# Patient Record
Sex: FEMALE | Race: WHITE | Employment: OTHER | URBAN - METROPOLITAN AREA
[De-identification: names, ages, dates, MRNs, and addresses within clinical notes are randomized per-mention and may not be internally consistent; named-entity substitution may affect disease eponyms.]

---

## 2017-03-27 ENCOUNTER — TELEPHONE (OUTPATIENT)
Dept: FAMILY MEDICINE | Facility: CLINIC | Age: 77
End: 2017-03-27

## 2017-03-27 NOTE — TELEPHONE ENCOUNTER
Patient called the pharmacy asking for provider to send prescription for meclizine.    If this refill is appropriate for the patient, please send a new RX; if this is not appropriate or the patient needs to be seen, please call the patient.  Thank you  Miri Khan [facility] [place]  Phone: [phone]  Fax: [phone]

## 2017-05-19 ENCOUNTER — TRANSFERRED RECORDS (OUTPATIENT)
Dept: HEALTH INFORMATION MANAGEMENT | Facility: CLINIC | Age: 77
End: 2017-05-19

## 2017-05-21 ENCOUNTER — OFFICE VISIT (OUTPATIENT)
Dept: URGENT CARE | Facility: URGENT CARE | Age: 77
End: 2017-05-21
Payer: COMMERCIAL

## 2017-05-21 VITALS
DIASTOLIC BLOOD PRESSURE: 75 MMHG | TEMPERATURE: 97.2 F | OXYGEN SATURATION: 95 % | SYSTOLIC BLOOD PRESSURE: 148 MMHG | BODY MASS INDEX: 30.29 KG/M2 | HEART RATE: 88 BPM | WEIGHT: 182 LBS

## 2017-05-21 DIAGNOSIS — R30.0 DYSURIA: ICD-10-CM

## 2017-05-21 DIAGNOSIS — N39.0 URINARY TRACT INFECTION, SITE UNSPECIFIED: Primary | ICD-10-CM

## 2017-05-21 DIAGNOSIS — R82.90 NONSPECIFIC FINDING ON EXAMINATION OF URINE: ICD-10-CM

## 2017-05-21 LAB
ALBUMIN UR-MCNC: ABNORMAL MG/DL
APPEARANCE UR: ABNORMAL
BACTERIA #/AREA URNS HPF: ABNORMAL /HPF
BILIRUB UR QL STRIP: NEGATIVE
COLOR UR AUTO: YELLOW
GLUCOSE UR STRIP-MCNC: NEGATIVE MG/DL
HGB UR QL STRIP: ABNORMAL
KETONES UR STRIP-MCNC: NEGATIVE MG/DL
LEUKOCYTE ESTERASE UR QL STRIP: ABNORMAL
NITRATE UR QL: NEGATIVE
NON-SQ EPI CELLS #/AREA URNS LPF: ABNORMAL /LPF
PH UR STRIP: 5.5 PH (ref 5–7)
RBC #/AREA URNS AUTO: ABNORMAL /HPF (ref 0–2)
SP GR UR STRIP: 1.01 (ref 1–1.03)
URN SPEC COLLECT METH UR: ABNORMAL
UROBILINOGEN UR STRIP-ACNC: 0.2 EU/DL (ref 0.2–1)
WBC #/AREA URNS AUTO: ABNORMAL /HPF (ref 0–2)

## 2017-05-21 PROCEDURE — 87186 SC STD MICRODIL/AGAR DIL: CPT | Performed by: FAMILY MEDICINE

## 2017-05-21 PROCEDURE — 87086 URINE CULTURE/COLONY COUNT: CPT | Performed by: FAMILY MEDICINE

## 2017-05-21 PROCEDURE — 99213 OFFICE O/P EST LOW 20 MIN: CPT | Performed by: FAMILY MEDICINE

## 2017-05-21 PROCEDURE — 87088 URINE BACTERIA CULTURE: CPT | Performed by: FAMILY MEDICINE

## 2017-05-21 PROCEDURE — 81001 URINALYSIS AUTO W/SCOPE: CPT | Performed by: FAMILY MEDICINE

## 2017-05-21 RX ORDER — CEPHALEXIN 500 MG/1
500 CAPSULE ORAL 4 TIMES DAILY
Qty: 40 CAPSULE | Refills: 0 | Status: SHIPPED | OUTPATIENT
Start: 2017-05-21 | End: 2017-05-21

## 2017-05-21 RX ORDER — CEPHALEXIN 500 MG/1
500 CAPSULE ORAL 4 TIMES DAILY
Qty: 40 CAPSULE | Refills: 0 | Status: SHIPPED | OUTPATIENT
Start: 2017-05-21 | End: 2017-06-06

## 2017-05-21 NOTE — NURSING NOTE
"Chief Complaint   Patient presents with     Urinary Problem     urgency and pain in lower abdomen x 5 days. has been pushing fluids        Initial /75  Pulse 88  Temp 97.2  F (36.2  C) (Tympanic)  Wt 182 lb (82.6 kg)  SpO2 95%  BMI 30.29 kg/m2 Estimated body mass index is 30.29 kg/(m^2) as calculated from the following:    Height as of 12/27/16: 5' 5\" (1.651 m).    Weight as of this encounter: 182 lb (82.6 kg).  Medication Reconciliation: complete     KACI Khanna      "

## 2017-05-21 NOTE — PROGRESS NOTES
SUBJECTIVE:   Nuria Shelley is a 77 year old female who  presents today for a possible UTI. Symptoms of urgency and frequency have been going on for 1week(s).  Hematuria no.  gradual onset and worseningand moderate.  There is no history of fever, chills, nausea or vomiting.  No history of vaginal discharge, no concerns for STD. This patient does not have a history of urinary tract infections. 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    Past Medical History:   Diagnosis Date     Allergic rhinitis      Back strain      Breast cancer (H) 2008    Right     Constipation      Depression      Fibrocystic breast      GERD (gastroesophageal reflux disease)      IBS (irritable bowel syndrome)      Urinary stress incontinence      Wrist tendonitis     Left     Current Outpatient Prescriptions   Medication Sig Dispense Refill     albuterol (PROAIR HFA/PROVENTIL HFA/VENTOLIN HFA) 108 (90 BASE) MCG/ACT Inhaler Inhale 2 puffs into the lungs every 6 hours as needed for shortness of breath / dyspnea 1 Inhaler 12     PARoxetine (PAXIL) 20 MG tablet Take 1 tablet (20 mg) by mouth At Bedtime 90 tablet 1     diclofenac (VOLTAREN) 75 MG EC tablet Take 1 tablet (75 mg) by mouth 2 times daily 180 tablet 0     fluticasone (FLONASE) 50 MCG/ACT spray Spray 1-2 sprays into both nostrils daily 1 Bottle 11     simvastatin (ZOCOR) 20 MG tablet TAKE ONE TABLET BY MOUTH EVERY NIGHT AT BEDTIME 90 tablet 2     fluticasone (FLONASE) 50 MCG/ACT nasal spray USE ONE TO TWO SPRAYS IN EACH NOSTRIL EVERY DAY 16 g 0     omeprazole 20 MG tablet Take 1 tablet (20 mg) by mouth daily Take 30-60 minutes before a meal. 90 tablet 2     fluticasone-salmeterol (ADVAIR) 100-50 MCG/DOSE diskus inhaler Inhale 1 puff into the lungs 2 times daily 1 Inhaler 11     tiotropium (SPIRIVA HANDIHALER) 18 MCG inhalation capsule Inhale contents of one capsule daily. 30 capsule 11     albuterol (2.5 MG/3ML) 0.083% nebulizer solution  Take 1 vial (2.5 mg) by nebulization every 6 hours as needed for shortness of breath / dyspnea or wheezing 60 vial 9     levalbuterol (XOPENEX) 1.25 MG/3ML nebulizer solution Use 3 ml in nebulizer stat 3 mL 0     order for DME Equipment being ordered: Nebulizer 1 each 0     olopatadine (PATANOL) 0.1 % ophthalmic solution Place 1 drop into both eyes 2 times daily. 1 mL 3     Multiple Vitamin (MULTI-VITAMIN PO) Take  by mouth.       Calcium-Vitamin D (CALCIUM + D PO) Take  by mouth. 1200 mg calcium daily with vitamin D 1000 units daily       Social History   Substance Use Topics     Smoking status: Former Smoker     Years: 20.00     Types: Cigarettes     Quit date: 1/12/1980     Smokeless tobacco: Never Used     Alcohol use Yes      Comment: ocass       ROS:   CONSTITUTIONAL:NEGATIVE for fever, chills, change in weight  ENT/MOUTH: NEGATIVE for ear, mouth and throat problems  RESP:NEGATIVE for significant cough or SOB  CV: NEGATIVE for chest pain, palpitations or peripheral edema  GI: NEGATIVE for nausea, abdominal pain, heartburn, or change in bowel habits  : POSITIVE for urgency and frequency     OBJECTIVE:  /75  Pulse 88  Temp 97.2  F (36.2  C) (Tympanic)  Wt 182 lb (82.6 kg)  SpO2 95%  BMI 30.29 kg/m2  GENERAL APPEARANCE: healthy, alert and no distress  RESP: lungs clear to auscultation - no rales, rhonchi or wheezes  CV: regular rates and rhythm, normal S1 S2, no murmur noted  ABDOMEN:  soft, nontender, no HSM or masses and bowel sounds normal  BACK: No CVA tenderness  PSYCH: mentation appears normal and affect normal/bright    Results for orders placed or performed in visit on 05/21/17   *UA reflex to Microscopic and Culture ([place] and [facility] (except [place] and [place])   Result Value Ref Range    Color Urine Yellow     Appearance Urine Cloudy     Glucose Urine Negative NEG mg/dL    Bilirubin Urine Negative NEG    Ketones Urine Negative NEG mg/dL    Specific Gravity Urine 1.015 1.003 -  1.035    Blood Urine Moderate (A) NEG    pH Urine 5.5 5.0 - 7.0 pH    Protein Albumin Urine Trace (A) NEG mg/dL    Urobilinogen Urine 0.2 0.2 - 1.0 EU/dL    Nitrite Urine Negative NEG    Leukocyte Esterase Urine Large (A) NEG    Source Midstream Urine    Urine Microscopic   Result Value Ref Range    WBC Urine  (A) 0 - 2 /HPF    RBC Urine 10-25 (A) 0 - 2 /HPF    Squamous Epithelial /LPF Urine Few FEW /LPF    Bacteria Urine Few (A) NEG /HPF         ASSESSMENT/PLAN:   (N39.0) Urinary tract infection, site unspecified  (primary encounter diagnosis)  Plan: cephALEXin (KEFLEX) 500 MG capsule,         DISCONTINUED: cephALEXin (KEFLEX) 500 MG         capsule            (R30.0) Dysuria  Comment: UTI  Plan: *UA reflex to Microscopic and Culture ([place]         and [facility] (except [place] and         [place]), Urine Microscopic, CANCELED: *UA         reflex to Microscopic and Culture ([place] and         [facility] (except [place] and         [place])            (R82.90) Nonspecific finding on examination of urine  Comment: UTI  Plan: Urine Culture Aerobic Bacterial            RX keflex given for empiric treatment of presumptive UTI.  Will follow up on urine culture and adjust medication if needed.  Drink plenty of fluids.  Prevention and treatment of UTI's discussed.  Signs and symptoms of pyelonephritis mentioned.      Return to clinic if no resolution of symptoms.    Lance Rain MD  May 21, 2017 3:16 PM

## 2017-05-21 NOTE — MR AVS SNAPSHOT
After Visit Summary   5/21/2017    Nuria Shelley    MRN: [id number]           Patient Information     Date Of Birth          1940        Visit Information        Provider Department      5/21/2017 2:10 PM Lance Rain MD [facility]        Today's Diagnoses     Urinary tract infection, site unspecified    -  1    Dysuria        Nonspecific finding on examination of urine          Care Instructions    Take full course of antibiotic for bladder infection.    We will contact you if there is a change in the antibiotic once the urine culture is finalized.        Urinary Tract Infections in Women  Urinary tract infections (UTIs) are most often caused by bacteria (germs). These bacteria enter the urinary tract. The bacteria may come from outside the body. Or they may travel from the skin outside the rectum or vagina into the urethra. Female anatomy makes it easier for bacteria from the bowel to enter a woman s urinary tract, which is the most common source of UTI. This means women develop UTIs more often than men. Pain in or around the urinary tract is a common UTI symptom. But the only way to know for sure if you have a UTI for the health care provider to test your urine. The two tests that may be done are the urinalysis and urine culture.  Types of UTIs    Cystitis: A bladder infection (cystitis) is the most common UTI in women. You may have urgent or frequent urination. You may also have pain, burning when you urinate, and bloody urine.    Urethritis: This is an inflamed urethra, which is the tube that carries urine from the bladder to outside the body. You may have lower stomach or back pain. You may also have urgent or frequent urination.    Pyelonephritis: This is a kidney infection. If not treated, it can be serious and damage your kidneys. In severe cases, you may be hospitalized. You may have a fever and lower back pain.  Medications to treat a UTI  Most UTIs are treated with  antibiotics. These kill the bacteria. The length of time you need to take them depends on the type of infection. It may be as short as 3 days. If you have repeated UTIs, a low-dose antibiotic may be needed for several months. Take antibiotics exactly as directed. Don t stop taking them until all of the medication is gone. If you stop taking the antibiotic too soon, the infection may not go away, and you may develop a resistance to the antibiotic. This can make it much harder to treat.  Lifestyle changes to treat and prevent UTIs  The lifestyle changes below will help get rid of your UTI. They may also help prevent future UTIs.    Drink plenty of fluids. This includes water, juice, or other caffeine-free drinks. Fluids help flush bacteria out of your body.    Empty your bladder. Always empty your bladder when you feel the urge to urinate. And always urinate before going to sleep. Urine that stays in your bladder can lead to infection. Try to urinate before and after sex as well.    Practice good personal hygiene. Wipe yourself from front to back after using the toilet. This helps keep bacteria from getting into the urethra.    Use condoms during sex. These help prevent UTIs caused by sexually transmitted bacteria. Also, avoid using spermicides during sex. These can increase the risk of UTIs. Choose other forms of birth control instead. For women who tend to get UTIs after sex, a low-dose of a preventive antibiotic may be used. Be sure to discuss this option with your health care provider.    Follow up with your health care provider as directed. He or she may test to make sure the infection has cleared. If necessary, additional treatment may be started.    5028-6893 [employer]. [address] [place] [address], [place] [address]. All rights reserved. This information is not intended as a substitute for professional medical care. Always follow your healthcare professional's instructions.              Follow-ups  after your visit        Who to contact     If you have questions or need follow up information about today's clinic visit or your schedule please contact [facility] directly at [phone].  Normal or non-critical lab and imaging results will be communicated to you by MyChart, letter or phone within 4 business days after the clinic has received the results. If you do not hear from us within 7 days, please contact the clinic through [employer] or phone. If you have a critical or abnormal lab result, we will notify you by phone as soon as possible.  Submit refill requests through [employer] or call your pharmacy and they will forward the refill request to us. Please allow 3 business days for your refill to be completed.          Additional Information About Your Visit        [employer] Information     [employer] gives you secure access to your electronic health record. If you see a primary care provider, you can also send messages to your care team and make appointments. If you have questions, please call your primary care clinic.  If you do not have a primary care provider, please call [phone] and they will assist you.        Care EveryWhere ID     This is your Care EveryWhere ID. This could be used by other organizations to access your [facility] medical records  [id number]        Your Vitals Were     Pulse Temperature Pulse Oximetry BMI (Body Mass Index)          88 97.2  F (36.2  C) (Tympanic) 95% 30.29 kg/m2         Blood Pressure from Last 3 Encounters:   05/21/17 148/75   12/27/16 124/82   12/01/16 136/80    Weight from Last 3 Encounters:   05/21/17 182 lb (82.6 kg)   12/27/16 184 lb (83.5 kg)   12/01/16 188 lb (85.3 kg)              We Performed the Following     *UA reflex to Microscopic and Culture ([place] and [facility] (except [place] and [place])     Urine Culture Aerobic Bacterial     Urine Microscopic          Today's Medication Changes          These changes are accurate as of: 5/21/17   3:08 PM.  If you have any questions, ask your nurse or doctor.               Start taking these medicines.        Dose/Directions    cephALEXin 500 MG capsule   Commonly known as:  KEFLEX   Used for:  Urinary tract infection, site unspecified   Started by:  Lance Rain MD        Dose:  500 mg   Take 1 capsule (500 mg) by mouth 4 times daily   Quantity:  40 capsule   Refills:  0            Where to get your medicines      Some of these will need a paper prescription and others can be bought over the counter.  Ask your nurse if you have questions.     Bring a paper prescription for each of these medications     cephALEXin 500 MG capsule                Primary Care Provider Office Phone # Fax #    Betty Ham [phone]       [facility] [address] [facility] [id number]        Thank you!     Thank you for choosing [facility]  [facility] your [facility]. Our goal is always to provide you with excellent care. Hearing back from our patients is one way we can continue to improve our services. Please take a few minutes to complete the written survey that you may receive in the mail after your visit with us. Thank you!             Your Updated Medication List - Protect others around you: Learn how to safely use, store and throw away your medicines at www.disposemymeds.org.          This list is accurate as of: 5/21/17  3:08 PM.  Always use your most recent med list.                   Brand Name Dispense Instructions for use    * albuterol (2.5 MG/3ML) 0.083% neb solution     60 vial    Take 1 vial (2.5 mg) by nebulization every 6 hours as needed for shortness of breath / dyspnea or wheezing       * albuterol 108 (90 BASE) MCG/ACT Inhaler    PROAIR HFA/PROVENTIL HFA/VENTOLIN HFA    1 Inhaler    Inhale 2 puffs into the lungs every 6 hours as needed for shortness of breath / dyspnea       CALCIUM + D PO      Take  by mouth. 1200 mg calcium daily with vitamin D 1000 units daily        cephALEXin 500 MG capsule    KEFLEX    40 capsule    Take 1 capsule (500 mg) by mouth 4 times daily       diclofenac 75 MG EC tablet    VOLTAREN    180 tablet    Take 1 tablet (75 mg) by mouth 2 times daily       * fluticasone 50 MCG/ACT spray    FLONASE    16 g    USE ONE TO TWO SPRAYS IN EACH NOSTRIL EVERY DAY       * fluticasone 50 MCG/ACT spray    FLONASE    1 Bottle    Spray 1-2 sprays into both nostrils daily       fluticasone-salmeterol 100-50 MCG/DOSE diskus inhaler    ADVAIR    1 Inhaler    Inhale 1 puff into the lungs 2 times daily       levalbuterol 1.25 MG/3ML neb solution    XOPENEX    3 mL    Use 3 ml in nebulizer stat       MULTI-VITAMIN PO      Take  by mouth.       olopatadine 0.1 % ophthalmic solution    PATANOL    1 mL    Place 1 drop into both eyes 2 times daily.       omeprazole 20 MG tablet     90 tablet    Take 1 tablet (20 mg) by mouth daily Take 30-60 minutes before a meal.       order for DME     1 each    Equipment being ordered: Nebulizer       PARoxetine 20 MG tablet    PAXIL    90 tablet    Take 1 tablet (20 mg) by mouth At Bedtime       simvastatin 20 MG tablet    ZOCOR    90 tablet    TAKE ONE TABLET BY MOUTH EVERY NIGHT AT BEDTIME       tiotropium 18 MCG capsule    SPIRIVA HANDIHALER    30 capsule    Inhale contents of one capsule daily.       * Notice:  This list has 4 medication(s) that are the same as other medications prescribed for you. Read the directions carefully, and ask your doctor or other care provider to review them with you.

## 2017-05-21 NOTE — PATIENT INSTRUCTIONS
Take full course of antibiotic for bladder infection.    We will contact you if there is a change in the antibiotic once the urine culture is finalized.        Urinary Tract Infections in Women  Urinary tract infections (UTIs) are most often caused by bacteria (germs). These bacteria enter the urinary tract. The bacteria may come from outside the body. Or they may travel from the skin outside the rectum or vagina into the urethra. Female anatomy makes it easier for bacteria from the bowel to enter a woman s urinary tract, which is the most common source of UTI. This means women develop UTIs more often than men. Pain in or around the urinary tract is a common UTI symptom. But the only way to know for sure if you have a UTI for the health care provider to test your urine. The two tests that may be done are the urinalysis and urine culture.  Types of UTIs    Cystitis: A bladder infection (cystitis) is the most common UTI in women. You may have urgent or frequent urination. You may also have pain, burning when you urinate, and bloody urine.    Urethritis: This is an inflamed urethra, which is the tube that carries urine from the bladder to outside the body. You may have lower stomach or back pain. You may also have urgent or frequent urination.    Pyelonephritis: This is a kidney infection. If not treated, it can be serious and damage your kidneys. In severe cases, you may be hospitalized. You may have a fever and lower back pain.  Medications to treat a UTI  Most UTIs are treated with antibiotics. These kill the bacteria. The length of time you need to take them depends on the type of infection. It may be as short as 3 days. If you have repeated UTIs, a low-dose antibiotic may be needed for several months. Take antibiotics exactly as directed. Don t stop taking them until all of the medication is gone. If you stop taking the antibiotic too soon, the infection may not go away, and you may develop a resistance to the  antibiotic. This can make it much harder to treat.  Lifestyle changes to treat and prevent UTIs  The lifestyle changes below will help get rid of your UTI. They may also help prevent future UTIs.    Drink plenty of fluids. This includes water, juice, or other caffeine-free drinks. Fluids help flush bacteria out of your body.    Empty your bladder. Always empty your bladder when you feel the urge to urinate. And always urinate before going to sleep. Urine that stays in your bladder can lead to infection. Try to urinate before and after sex as well.    Practice good personal hygiene. Wipe yourself from front to back after using the toilet. This helps keep bacteria from getting into the urethra.    Use condoms during sex. These help prevent UTIs caused by sexually transmitted bacteria. Also, avoid using spermicides during sex. These can increase the risk of UTIs. Choose other forms of birth control instead. For women who tend to get UTIs after sex, a low-dose of a preventive antibiotic may be used. Be sure to discuss this option with your health care provider.    Follow up with your health care provider as directed. He or she may test to make sure the infection has cleared. If necessary, additional treatment may be started.    [id number] [employer]. [address] [place] [address], [place] [address]. All rights reserved. This information is not intended as a substitute for professional medical care. Always follow your healthcare professional's instructions.

## 2017-05-22 LAB
BACTERIA SPEC CULT: ABNORMAL
MICRO REPORT STATUS: ABNORMAL
MICROORGANISM SPEC CULT: ABNORMAL
SPECIMEN SOURCE: ABNORMAL

## 2017-06-05 ENCOUNTER — TRANSFERRED RECORDS (OUTPATIENT)
Dept: HEALTH INFORMATION MANAGEMENT | Facility: CLINIC | Age: 77
End: 2017-06-05

## 2017-06-06 ENCOUNTER — TELEPHONE (OUTPATIENT)
Dept: FAMILY MEDICINE | Facility: CLINIC | Age: 77
End: 2017-06-06

## 2017-06-06 ENCOUNTER — OFFICE VISIT (OUTPATIENT)
Dept: FAMILY MEDICINE | Facility: CLINIC | Age: 77
End: 2017-06-06
Payer: COMMERCIAL

## 2017-06-06 VITALS
TEMPERATURE: 97.7 F | HEIGHT: 65 IN | DIASTOLIC BLOOD PRESSURE: 74 MMHG | BODY MASS INDEX: 30.82 KG/M2 | SYSTOLIC BLOOD PRESSURE: 122 MMHG | HEART RATE: 99 BPM | RESPIRATION RATE: 14 BRPM | OXYGEN SATURATION: 97 % | WEIGHT: 185 LBS

## 2017-06-06 DIAGNOSIS — E78.5 HYPERLIPIDEMIA LDL GOAL <130: ICD-10-CM

## 2017-06-06 DIAGNOSIS — Z87.440 H/O RECURRENT URINARY TRACT INFECTION: Primary | ICD-10-CM

## 2017-06-06 DIAGNOSIS — Z71.89 ADVANCED DIRECTIVES, COUNSELING/DISCUSSION: ICD-10-CM

## 2017-06-06 DIAGNOSIS — C50.911 MALIGNANT NEOPLASM OF RIGHT FEMALE BREAST, UNSPECIFIED SITE OF BREAST: ICD-10-CM

## 2017-06-06 DIAGNOSIS — R39.11 URINARY HESITANCY: ICD-10-CM

## 2017-06-06 DIAGNOSIS — R09.81 CHRONIC NASAL CONGESTION: ICD-10-CM

## 2017-06-06 DIAGNOSIS — J44.9 CHRONIC OBSTRUCTIVE PULMONARY DISEASE, UNSPECIFIED COPD TYPE (H): ICD-10-CM

## 2017-06-06 DIAGNOSIS — F33.0 MAJOR DEPRESSIVE DISORDER, RECURRENT EPISODE, MILD (H): ICD-10-CM

## 2017-06-06 DIAGNOSIS — C79.51 BONE METASTASIS: ICD-10-CM

## 2017-06-06 LAB
BACTERIA #/AREA URNS HPF: ABNORMAL /HPF
NON-SQ EPI CELLS #/AREA URNS LPF: ABNORMAL /LPF
RBC #/AREA URNS AUTO: ABNORMAL /HPF (ref 0–2)
URNS CMNT MICRO: ABNORMAL
WBC #/AREA URNS AUTO: >100 /HPF (ref 0–2)

## 2017-06-06 PROCEDURE — 87086 URINE CULTURE/COLONY COUNT: CPT | Performed by: FAMILY MEDICINE

## 2017-06-06 PROCEDURE — 81001 URINALYSIS AUTO W/SCOPE: CPT | Performed by: FAMILY MEDICINE

## 2017-06-06 PROCEDURE — 99214 OFFICE O/P EST MOD 30 MIN: CPT | Performed by: FAMILY MEDICINE

## 2017-06-06 RX ORDER — CEPHALEXIN 500 MG/1
500 CAPSULE ORAL 3 TIMES DAILY
Qty: 21 CAPSULE | Refills: 0 | Status: CANCELLED | OUTPATIENT
Start: 2017-06-06 | End: 2017-06-13

## 2017-06-06 RX ORDER — CEFDINIR 300 MG/1
600 CAPSULE ORAL DAILY
Qty: 10 CAPSULE | Refills: 0 | Status: SHIPPED | OUTPATIENT
Start: 2017-06-06 | End: 2017-06-11

## 2017-06-06 RX ORDER — FLUTICASONE PROPIONATE 50 MCG
SPRAY, SUSPENSION (ML) NASAL
Qty: 16 G | Refills: 11 | Status: SHIPPED | OUTPATIENT
Start: 2017-06-06 | End: 2019-04-17

## 2017-06-06 RX ORDER — SIMVASTATIN 20 MG
TABLET ORAL
Qty: 90 TABLET | Refills: 0 | Status: SHIPPED | OUTPATIENT
Start: 2017-06-06 | End: 2017-10-06

## 2017-06-06 RX ORDER — PAROXETINE 20 MG/1
20 TABLET, FILM COATED ORAL AT BEDTIME
Qty: 90 TABLET | Refills: 0 | Status: SHIPPED | OUTPATIENT
Start: 2017-06-06 | End: 2017-10-06

## 2017-06-06 NOTE — LETTER
My Depression Action Plan  Name: Nuria Shelley   Date of Birth 1940  Date: 6/6/2017    My doctor: Betty Ham   My clinic: [address]  [place] [address]  [phone]          GREEN    ZONE   Good Control    What it looks like:     Things are going generally well. You have normal up s and down s. You may even feel depressed from time to time, but bad moods usually last less than a day.   What you need to do:  1. Continue to care for yourself (see self care plan)  2. Check your depression survival kit and update it as needed  3. Follow your physician s recommendations including any medication.  4. Do not stop taking medication unless you consult with your physician first.           YELLOW         ZONE Getting Worse    What it looks like:     Depression is starting to interfere with your life.     It may be hard to get out of bed; you may be starting to isolate yourself from others.    Symptoms of depression are starting to last most all day and this has happened for several days.     You may have suicidal thoughts but they are not constant.   What you need to do:     1. Call your care team, your response to treatment will improve if you keep your care team informed of your progress. Yellow periods are signs an adjustment may need to be made.     2. Continue your self-care, even if you have to fake it!    3. Talk to someone in your support network    4. Open up your depression survival kit           RED    ZONE Medical Alert - Get Help    What it looks like:     Depression is seriously interfering with your life.     You may experience these or other symptoms: You can t get out of bed most days, can t work or engage in other necessary activities, you have trouble taking care of basic hygiene, or basic responsibilities, thoughts of suicide or death that will not go away, self-injurious behavior.     What you need to do:  1. Call your care team and  request a same-day appointment. If they are not available (weekends or after hours) call your local crisis line, emergency room or 911.      Electronically signed by: Amarilys Spivey, June 6, 2017    Depression Self Care Plan / Survival Kit    Self-Care for Depression  Here s the deal. Your body and mind are really not as separate as most people think.  What you do and think affects how you feel and how you feel influences what you do and think. This means if you do things that people who feel good do, it will help you feel better.  Sometimes this is all it takes.  There is also a place for medication and therapy depending on how severe your depression is, so be sure to consult with your medical provider and/ or Behavioral Health Consultant if your symptoms are worsening or not improving.     In order to better manage my stress, I will:    Exercise  Get some form of exercise, every day. This will help reduce pain and release endorphins, the  feel good  chemicals in your brain. This is almost as good as taking antidepressants!  This is not the same as joining a gym and then never going! (they count on that by the way ) It can be as simple as just going for a walk or doing some gardening, anything that will get you moving.      Hygiene   Maintain good hygiene (Get out of bed in the morning, Make your bed, Brush your teeth, Take a shower, and Get dressed like you were going to work, even if you are unemployed).  If your clothes don't fit try to get ones that do.    Diet  I will strive to eat foods that are good for me, drink plenty of water, and avoid excessive sugar, caffeine, alcohol, and other mood-altering substances.  Some foods that are helpful in depression are: complex carbohydrates, B vitamins, flaxseed, fish or fish oil, fresh fruits and vegetables.    Psychotherapy  I agree to participate in Individual Therapy (if recommended).    Medication  If prescribed medications, I agree to take them.  Missing doses can  result in serious side effects.  I understand that drinking alcohol, or other illicit drug use, may cause potential side effects.  I will not stop my medication abruptly without first discussing it with my provider.    Staying Connected With Others  I will stay in touch with my friends, family members, and my primary care provider/team.    Use your imagination  Be creative.  We all have a creative side; it doesn t matter if it s oil painting, sand castles, or mud pies! This will also kick up the endorphins.    Witness Beauty  (AKA stop and smell the roses) Take a look outside, even in mid-winter. Notice colors, textures. Watch the squirrels and birds.     Service to others  Be of service to others.  There is always someone else in need.  By helping others we can  get out of ourselves  and remember the really important things.  This also provides opportunities for practicing all the other parts of the program.    Humor  Laugh and be silly!  Adjust your TV habits for less news and crime-drama and more comedy.    Control your stress  Try breathing deep, massage therapy, biofeedback, and meditation. Find time to relax each day.     My support system    Clinic Contact:  Phone number:    Contact 1:  Phone number:    Contact 2:  Phone number:    Spiritism/:  Phone number:    Therapist:  Phone number:    Local crisis center:    Phone number:    Other community support:  Phone number:

## 2017-06-06 NOTE — PROGRESS NOTES
"  SUBJECTIVE:                                                    Nuria Shelley is a 77 year old female who presents to clinic today for the following health issues:    URINARY TRACT SYMPTOMS     Onset: 3 days    Description:   Painful urination (Dysuria): mild   Blood in urine (Hematuria): no   Delay in urine (Hesitency): YES    Intensity: moderate    Progression of Symptoms:  worsening    Accompanying Signs & Symptoms:  Fever/chills: no   Flank pain: no   Nausea and vomiting: no   Any vaginal symptoms: none  Abdominal/Pelvic Pain: YES   History:   History of frequent UTI's: YES  History of kidney stones: no   Sexually Active: no   Possibility of pregnancy: No    Precipitating factors: radiation treatment            Therapies Tried and outcome: Increase fluid intake    ROS:  C: NEGATIVE for fever, chills, change in weight   female: Hx urinary tract infection    I have reviewed the patient's medical history in detail and updated the computerized patient record.     OBJECTIVE:                                                    /74  Pulse 99  Temp 97.7  F (36.5  C)  Resp 14  Ht 5' 5\" (1.651 m)  Wt 185 lb (83.9 kg)  SpO2 97%  BMI 30.79 kg/m2  Body mass index is 30.79 kg/(m^2).  GENERAL: alert, no distress and obese  MS: extremities normal- no gross deformities noted  PSYCH: mentation appears normal, affect normal/bright    Diagnostic Test Results:  Results for orders placed or performed in visit on 05/21/17   *UA reflex to Microscopic and Culture ([facility] (except [place] and [place])   Result Value Ref Range    Color Urine Yellow     Appearance Urine Cloudy     Glucose Urine Negative NEG mg/dL    Bilirubin Urine Negative NEG    Ketones Urine Negative NEG mg/dL    Specific Gravity Urine 1.015 1.003 - 1.035    Blood Urine Moderate (A) NEG    pH Urine 5.5 5.0 - 7.0 pH    Protein Albumin Urine Trace (A) NEG mg/dL    Urobilinogen Urine 0.2 0.2 - 1.0 EU/dL    Nitrite Urine Negative " NEG    Leukocyte Esterase Urine Large (A) NEG    Source Midstream Urine    Urine Microscopic   Result Value Ref Range    WBC Urine  (A) 0 - 2 /HPF    RBC Urine 10-25 (A) 0 - 2 /HPF    Squamous Epithelial /LPF Urine Few FEW /LPF    Bacteria Urine Few (A) NEG /HPF   Urine Culture Aerobic Bacterial   Result Value Ref Range    Specimen Description Midstream Urine     Culture Micro (A)      50,000 to 100,000 colonies/mL Escherichia coli  Plus 10,000 to 50,000 colonies/mL mixed urogenital evgeny Susceptibility testing   not routinely done      Micro Report Status FINAL 05/22/2017     Organism: 50,000 to 100,000 colonies/mL Escherichia coli        Susceptibility    50,000 to 100,000 colonies/ml escherichia coli (chris) -  (no method available)     AMPICILLIN <=2 Susceptible  ug/mL     CEFAZOLIN Value in next row  ug/mL      <=4 SusceptibleCefazolin CHRIS breakpoints are for the treatment of uncomplicated urinary tract infections.  For the treatment of systemic infections, please contact the laboratory for additional testing.     CEFOXITIN Value in next row  ug/mL      <=4 SusceptibleCefazolin CHRIS breakpoints are for the treatment of uncomplicated urinary tract infections.  For the treatment of systemic infections, please contact the laboratory for additional testing.     CEFTAZIDIME Value in next row  ug/mL      <=4 SusceptibleCefazolin CHRIS breakpoints are for the treatment of uncomplicated urinary tract infections.  For the treatment of systemic infections, please contact the laboratory for additional testing.     CEFTRIAXONE Value in next row  ug/mL      <=4 SusceptibleCefazolin CHRIS breakpoints are for the treatment of uncomplicated urinary tract infections.  For the treatment of systemic infections, please contact the laboratory for additional testing.     CIPROFLOXACIN Value in next row  ug/mL      <=4 SusceptibleCefazolin CHRIS breakpoints are for the treatment of uncomplicated urinary tract infections.  For the  treatment of systemic infections, please contact the laboratory for additional testing.     GENTAMICIN Value in next row  ug/mL      <=4 SusceptibleCefazolin JUNO breakpoints are for the treatment of uncomplicated urinary tract infections.  For the treatment of systemic infections, please contact the laboratory for additional testing.     LEVOFLOXACIN Value in next row  ug/mL      <=4 SusceptibleCefazolin JUNO breakpoints are for the treatment of uncomplicated urinary tract infections.  For the treatment of systemic infections, please contact the laboratory for additional testing.     NITROFURANTOIN Value in next row  ug/mL      <=4 SusceptibleCefazolin JUNO breakpoints are for the treatment of uncomplicated urinary tract infections.  For the treatment of systemic infections, please contact the laboratory for additional testing.     TOBRAMYCIN Value in next row  ug/mL      <=4 SusceptibleCefazolin JUNO breakpoints are for the treatment of uncomplicated urinary tract infections.  For the treatment of systemic infections, please contact the laboratory for additional testing.     Trimethoprim/Sulfa Value in next row  ug/mL      <=4 SusceptibleCefazolin JUNO breakpoints are for the treatment of uncomplicated urinary tract infections.  For the treatment of systemic infections, please contact the laboratory for additional testing.     AMPICILLIN/SULBACTAM Value in next row  ug/mL      <=4 SusceptibleCefazolin JUNO breakpoints are for the treatment of uncomplicated urinary tract infections.  For the treatment of systemic infections, please contact the laboratory for additional testing.     Piperacillin/Tazo Value in next row  ug/mL      <=4 SusceptibleCefazolin JUNO breakpoints are for the treatment of uncomplicated urinary tract infections.  For the treatment of systemic infections, please contact the laboratory for additional testing.     CEFEPIME Value in next row  ug/mL      <=4 SusceptibleCefazolin JUNO breakpoints are for  the treatment of uncomplicated urinary tract infections.  For the treatment of systemic infections, please contact the laboratory for additional testing.        ASSESSMENT/PLAN:                                                    (Z87.440) H/O recurrent urinary tract infection  (primary encounter diagnosis)  (R39.11) Urinary hesitancy  Comment: unable to give UA today   Plan: UA future        Omnicef 600 mg daily x 5 days; Call or return to clinic as needed if these symptoms worsen or fail to improve as anticipated.     (C50.911) Malignant neoplasm of right female breast, unspecified site of breast (H)  (C79.51) Bone metastasis (H)  Comment: under care of [facility]     (F33.0) Major depressive disorder, recurrent episode, mild (H)  Comment: Well controlled with medications without side effects.   Plan: PARoxetine (PAXIL) 20 MG tablet        Follow-up 3 months     (J44.9) Chronic obstructive pulmonary disease, unspecified COPD type (H)  Plan: albuterol HFA as needed     (E78.5) Hyperlipidemia LDL goal <130  Comment: Well controlled with medications without side effects.   Plan: simvastatin (ZOCOR) 20 MG tablet        Follow-up 3 months     (R09.81) Chronic nasal congestion  Plan: fluticasone (FLONASE) 50 MCG/ACT spray          (Z71.89) Advanced directives, counseling/discussion  Plan: referral for planning     See Patient Instructions    Amarilys Spivey MD  [facility]

## 2017-06-06 NOTE — TELEPHONE ENCOUNTER
RN spoke with patient and states she has a sharp pain around her groin area and that is the exact symptoms she had few weeks ago when she was seen over UC and was diagnosed with UTI.  Pt states she completed Keflex for 9 days few days ago and her symptoms is back now.  Pt denies of fever, severe abdominal pain, weakness, dizziness, lightheadedness, or blood in the urine at this time.   Since patient c/o sharp pain around the groin area and rated pain 10/10, RN advised patient to be seen in the clinic for further evaluation.  RN helped patient scheduling with Dr. Spivey this evening at 5:20 PM and advised patient call the clinic with worsening symptoms to to go to UC.  Pt agrees and verbalized understanding.    Aguilar BEAL RN, BSN

## 2017-06-06 NOTE — PATIENT INSTRUCTIONS
[facility]    If you have any questions regarding to your visit please contact your care team:       Team Red:   Clinic Hours Telephone Number   Dr. Amarilys Hernandez  (pediatrics)  Katalina Lozano NP 7am-7pm  Monday - Thursday   7am-5pm  Fridays  [phone] (fax)    Aguilar VEGA  [phone]   Urgent Care - [place] and [place] Monday-Friday  [place] - 11am-8pm  Saturday-Sunday  Both sites - 9am-5pm  [phone] - [facility]  [phone] - [place]       What options do I have for visits at the clinic other than the traditional office visit?  To expand how we care for you, many of our providers are utilizing electronic visits (e-visits) and telephone visits, when medically appropriate, for interactions with their patients rather than a visit in the clinic.   We also offer nurse visits for many medical concerns. Just like any other service, we will bill your insurance company for this type of visit based on time spent on the phone with your provider. Not all insurance companies cover these visits. Please check with your medical insurance if this type of visit is covered. You will be responsible for any charges that are not paid by your insurance.      E-visits via [employer]:  generally incur a $35.00 fee.  Telephone visits:  Time spent on the phone: *charged based on time that is spent on the phone in increments of 10 minutes. Estimated cost:   5-10 mins $30.00   11-20 mins. $59.00   21-30 mins. $85.00     As always, Thank you for trusting us with your health care needs!            Discharged by Manasa Smith MA.

## 2017-06-06 NOTE — TELEPHONE ENCOUNTER
Reason for Call:  Other call back    Detailed comments:  Patient calling. She was seen for a uti. She wants to come in for a lab to have the urine rechecked. Please call her and let know.     Phone Number Patient can be reached at: Home number on file [phone] (home)    Best Time:  any    Can we leave a detailed message on this number? YES    Call taken on 6/6/2017 at 9:09 AM by Nati Trinidad

## 2017-06-06 NOTE — PROGRESS NOTES
You have a urinary tract infection. Take the antibiotic as we discussed. Your final test results are pending.  Please check your chart again within 3 to 5 days. You will receive further instruction when your full test result panel is complete.    Amarilys Spivey MD

## 2017-06-06 NOTE — MR AVS SNAPSHOT
After Visit Summary   6/6/2017    Nuria Shelley    MRN: [id number]           Patient Information     Date Of Birth          1940        Visit Information        Provider Department      6/6/2017 5:20 PM Amarilys Spivey MD [facility]        Today's Diagnoses     H/O recurrent urinary tract infection    -  1    Urinary hesitancy        Malignant neoplasm of right female breast, unspecified site of breast (H)        Bone metastasis (H)        Major depressive disorder, recurrent episode, mild (H)        Chronic obstructive pulmonary disease, unspecified COPD type (H)        Hyperlipidemia LDL goal <130        Chronic nasal congestion        Advanced directives, counseling/discussion          Care Instructions    [facility]    If you have any questions regarding to your visit please contact your care team:       Team Red:   Clinic Hours Telephone Number   Dr. Amarilys Hernandez  (pediatrics)  Katalina Lozano NP 7am-7pm  Monday - Thursday   7am-5pm  Fridays  [phone] (fax)    Aguilar VEGA  [phone]   Urgent Care - [place] and [place] Monday-Friday  [place] - 11am-8pm  Saturday-Sunday  Both sites - 9am-5pm  [phone] - [facility]  [phone] - [place]       What options do I have for visits at the clinic other than the traditional office visit?  To expand how we care for you, many of our providers are utilizing electronic visits (e-visits) and telephone visits, when medically appropriate, for interactions with their patients rather than a visit in the clinic.   We also offer nurse visits for many medical concerns. Just like any other service, we will bill your insurance company for this type of visit based on time spent on the phone with your provider. Not all insurance companies cover these visits. Please check with your medical insurance if this type of visit is covered. You will be responsible  for any charges that are not paid by your insurance.      E-visits via [employer]:  generally incur a $35.00 fee.  Telephone visits:  Time spent on the phone: *charged based on time that is spent on the phone in increments of 10 minutes. Estimated cost:   5-10 mins $30.00   11-20 mins. $59.00   21-30 mins. $85.00     As always, Thank you for trusting us with your health care needs!            Discharged by Manasa Smith MA.            Follow-ups after your visit        Follow-up notes from your care team     Return in about 3 months (around 9/6/2017), or if symptoms worsen or fail to improve, for Wellness visit (fasting labs up to one week prior).      Future tests that were ordered for you today     Open Future Orders        Priority Expected Expires Ordered    UA reflex to Microscopic and Culture Routine  6/6/2018 6/6/2017            Who to contact     If you have questions or need follow up information about today's clinic visit or your schedule please contact [facility] directly at [phone].  Normal or non-critical lab and imaging results will be communicated to you by [facility], letter or phone within 4 business days after the clinic has received the results. If you do not hear from us within 7 days, please contact the clinic through [employer] or phone. If you have a critical or abnormal lab result, we will notify you by phone as soon as possible.  Submit refill requests through [employer] or call your pharmacy and they will forward the refill request to us. Please allow 3 business days for your refill to be completed.          Additional Information About Your Visit        Six Apart Information     [employer] gives you secure access to your electronic health record. If you see a primary care provider, you can also send messages to your care team and make appointments. If you have questions, please call your primary care clinic.  If you do not have a primary care provider, please call [phone] and they  "will assist you.        Care EveryWhere ID     This is your Care EveryWhere ID. This could be used by other organizations to access your [facility] medical records  [id number]        Your Vitals Were     Pulse Temperature Respirations Height Pulse Oximetry BMI (Body Mass Index)    99 97.7  F (36.5  C) 14 5' 5\" (1.651 m) 97% 30.79 kg/m2       Blood Pressure from Last 3 Encounters:   06/06/17 122/74   05/21/17 148/75   12/27/16 124/82    Weight from Last 3 Encounters:   06/06/17 185 lb (83.9 kg)   05/21/17 182 lb (82.6 kg)   12/27/16 184 lb (83.5 kg)              We Performed the Following     DEPRESSION ACTION PLAN (DAP)          Today's Medication Changes          These changes are accurate as of: 6/6/17  6:05 PM.  If you have any questions, ask your nurse or doctor.               Start taking these medicines.        Dose/Directions    cefdinir 300 MG capsule   Commonly known as:  OMNICEF   Used for:  H/O recurrent urinary tract infection   Started by:  Amarilys Spivey MD        Dose:  600 mg   Take 2 capsules (600 mg) by mouth daily for 5 days   Quantity:  10 capsule   Refills:  0         These medicines have changed or have updated prescriptions.        Dose/Directions    fluticasone 50 MCG/ACT spray   Commonly known as:  FLONASE   This may have changed:  See the new instructions.   Used for:  Chronic nasal congestion   Changed by:  Amarilys Spivey MD        USE ONE TO TWO SPRAYS IN EACH NOSTRIL EVERY DAY   Quantity:  16 g   Refills:  11       simvastatin 20 MG tablet   Commonly known as:  ZOCOR   This may have changed:  See the new instructions.   Used for:  Hyperlipidemia LDL goal <130   Changed by:  Amarilys Spivey MD        TAKE ONE TABLET BY MOUTH EVERY NIGHT AT BEDTIME   Quantity:  90 tablet   Refills:  0            Where to get your medicines      These medications were sent to [facility] - [address]  [address], [place] [address]     Phone:  " [phone]     cefdinir 300 MG capsule    fluticasone 50 MCG/ACT spray    PARoxetine 20 MG tablet    simvastatin 20 MG tablet                Primary Care Provider Office Phone # Fax #    Betty Ham [phone]       [address]  [place] [address]        Thank you!     Thank you for choosing [facility]  for your care. Our goal is always to provide you with excellent care. Hearing back from our patients is one way we can continue to improve our services. Please take a few minutes to complete the written survey that you may receive in the mail after your visit with us. Thank you!             Your Updated Medication List - Protect others around you: Learn how to safely use, store and throw away your medicines at www.disposemymeds.org.          This list is accurate as of: 6/6/17  6:05 PM.  Always use your most recent med list.                   Brand Name Dispense Instructions for use    albuterol 108 (90 BASE) MCG/ACT Inhaler    PROAIR HFA/PROVENTIL HFA/VENTOLIN HFA    1 Inhaler    Inhale 2 puffs into the lungs every 6 hours as needed for shortness of breath / dyspnea       CALCIUM + D PO      Take  by mouth. 1200 mg calcium daily with vitamin D 1000 units daily       cefdinir 300 MG capsule    OMNICEF    10 capsule    Take 2 capsules (600 mg) by mouth daily for 5 days       fluticasone 50 MCG/ACT spray    FLONASE    16 g    USE ONE TO TWO SPRAYS IN EACH NOSTRIL EVERY DAY       MULTI-VITAMIN PO      Take  by mouth.       olopatadine 0.1 % ophthalmic solution    PATANOL    1 mL    Place 1 drop into both eyes 2 times daily.       PARoxetine 20 MG tablet    PAXIL    90 tablet    Take 1 tablet (20 mg) by mouth At Bedtime       simvastatin 20 MG tablet    ZOCOR    90 tablet    TAKE ONE TABLET BY MOUTH EVERY NIGHT AT BEDTIME

## 2017-06-12 ENCOUNTER — TELEPHONE (OUTPATIENT)
Dept: FAMILY MEDICINE | Facility: CLINIC | Age: 77
End: 2017-06-12

## 2017-06-12 LAB
BACTERIA SPEC CULT: NORMAL
MICRO REPORT STATUS: NORMAL
SPECIMEN SOURCE: NORMAL

## 2017-06-12 NOTE — TELEPHONE ENCOUNTER
Nuria was in to see Dr. Spivey on 06-06-17 and has the diagnosis of Depression and was due for a PHQ-9, Please complete.

## 2017-06-12 NOTE — TELEPHONE ENCOUNTER
Julian Quiñones, I m Le Talley. I work with Dr. Spivey at [facility]. He/she noticed in your chart that you are due for a patient health questionnaire. We would like to complete that today as Dr. Spivey cares about your health.        Called patient; Called patient, completed PHQ9. PHQ9 score: 5. (If patient has sucidal thoughts discuss with Team RN ASAP) <5 PASS, >5 FAIL Needs follow up appointment.  If patient refuses appointment please ask them if they would be willing to speak to the Team RN for further support over the phone.     If PHQ-9 is less than 5, close encounter. If PHQ-9 is 5 or greater, recommend that patient schedule follow up appointment with primary provider (in office, e-visit or phone visit) for medication follow up and evaluation. If positive suicidal thoughts, huddle with RN or provider today and route encounter.     Appointment Made? Patient refused appointment    Le Talley MA

## 2017-06-12 NOTE — PROGRESS NOTES
Dear Nuria,    Your recent test results are attached.      No infection on urine culture.    If you have any questions please feel free to contact [phone] or myself via [employer].    Sincerely,  Poonam Lao, CNP

## 2017-06-13 ASSESSMENT — PATIENT HEALTH QUESTIONNAIRE - PHQ9: SUM OF ALL RESPONSES TO PHQ QUESTIONS 1-9: 5

## 2017-06-29 ENCOUNTER — NURSE TRIAGE (OUTPATIENT)
Dept: NURSING | Facility: CLINIC | Age: 77
End: 2017-06-29

## 2017-06-29 NOTE — TELEPHONE ENCOUNTER
Nuria states that she has recently had radiation therapy. Today she is not feeling well, she has a low grade fever, feels hot and cold and achy with some  Mild shortness of breath. She says that she talked to the nurse at her radiation center who advised she see her primary. Transferred to appointment line.    Alpa Davis RN     [facility] Nurse Advisor

## 2017-07-07 ENCOUNTER — OFFICE VISIT (OUTPATIENT)
Dept: URGENT CARE | Facility: URGENT CARE | Age: 77
End: 2017-07-07
Payer: COMMERCIAL

## 2017-07-07 VITALS
SYSTOLIC BLOOD PRESSURE: 130 MMHG | DIASTOLIC BLOOD PRESSURE: 72 MMHG | HEART RATE: 98 BPM | TEMPERATURE: 97.8 F | BODY MASS INDEX: 30.45 KG/M2 | WEIGHT: 183 LBS

## 2017-07-07 DIAGNOSIS — R30.0 DYSURIA: ICD-10-CM

## 2017-07-07 DIAGNOSIS — M62.81 GENERALIZED MUSCLE WEAKNESS: Primary | ICD-10-CM

## 2017-07-07 DIAGNOSIS — C79.51 BONE METASTASIS: ICD-10-CM

## 2017-07-07 DIAGNOSIS — R53.83 FATIGUE, UNSPECIFIED TYPE: ICD-10-CM

## 2017-07-07 DIAGNOSIS — R10.32 ABDOMINAL PAIN, LEFT LOWER QUADRANT: ICD-10-CM

## 2017-07-07 LAB
ALBUMIN UR-MCNC: NEGATIVE MG/DL
APPEARANCE UR: ABNORMAL
BACTERIA #/AREA URNS HPF: ABNORMAL /HPF
BASOPHILS # BLD AUTO: 0.1 10E9/L (ref 0–0.2)
BASOPHILS NFR BLD AUTO: 1 %
BILIRUB UR QL STRIP: NEGATIVE
COLOR UR AUTO: YELLOW
DIFFERENTIAL METHOD BLD: NORMAL
EOSINOPHIL # BLD AUTO: 0.1 10E9/L (ref 0–0.7)
EOSINOPHIL NFR BLD AUTO: 2 %
ERYTHROCYTE [DISTWIDTH] IN BLOOD BY AUTOMATED COUNT: 14.5 % (ref 10–15)
GLUCOSE UR STRIP-MCNC: NEGATIVE MG/DL
HCT VFR BLD AUTO: 39 % (ref 35–47)
HGB BLD-MCNC: 13.2 G/DL (ref 11.7–15.7)
HGB UR QL STRIP: NEGATIVE
KETONES UR STRIP-MCNC: NEGATIVE MG/DL
LEUKOCYTE ESTERASE UR QL STRIP: ABNORMAL
LYMPHOCYTES # BLD AUTO: 1 10E9/L (ref 0.8–5.3)
LYMPHOCYTES NFR BLD AUTO: 19 %
MCH RBC QN AUTO: 33 PG (ref 26.5–33)
MCHC RBC AUTO-ENTMCNC: 33.8 G/DL (ref 31.5–36.5)
MCV RBC AUTO: 98 FL (ref 78–100)
MONOCYTES # BLD AUTO: 0.7 10E9/L (ref 0–1.3)
MONOCYTES NFR BLD AUTO: 13 %
NEUTROPHILS # BLD AUTO: 3.5 10E9/L (ref 1.6–8.3)
NEUTROPHILS NFR BLD AUTO: 65 %
NITRATE UR QL: NEGATIVE
PH UR STRIP: 5.5 PH (ref 5–7)
PLATELET # BLD AUTO: 243 10E9/L (ref 150–450)
RBC # BLD AUTO: 4 10E12/L (ref 3.8–5.2)
RBC #/AREA URNS AUTO: ABNORMAL /HPF (ref 0–2)
SP GR UR STRIP: 1.01 (ref 1–1.03)
URN SPEC COLLECT METH UR: ABNORMAL
UROBILINOGEN UR STRIP-ACNC: 0.2 EU/DL (ref 0.2–1)
WBC # BLD AUTO: 5.4 10E9/L (ref 4–11)
WBC #/AREA URNS AUTO: ABNORMAL /HPF (ref 0–2)

## 2017-07-07 PROCEDURE — 99215 OFFICE O/P EST HI 40 MIN: CPT | Performed by: FAMILY MEDICINE

## 2017-07-07 PROCEDURE — 36415 COLL VENOUS BLD VENIPUNCTURE: CPT | Performed by: FAMILY MEDICINE

## 2017-07-07 PROCEDURE — 80053 COMPREHEN METABOLIC PANEL: CPT | Performed by: FAMILY MEDICINE

## 2017-07-07 PROCEDURE — 85025 COMPLETE CBC W/AUTO DIFF WBC: CPT | Performed by: FAMILY MEDICINE

## 2017-07-07 PROCEDURE — 87086 URINE CULTURE/COLONY COUNT: CPT | Performed by: FAMILY MEDICINE

## 2017-07-07 PROCEDURE — 81001 URINALYSIS AUTO W/SCOPE: CPT | Performed by: FAMILY MEDICINE

## 2017-07-07 RX ORDER — CEPHALEXIN 500 MG/1
500 CAPSULE ORAL 3 TIMES DAILY
Qty: 21 CAPSULE | Refills: 0 | Status: SHIPPED | OUTPATIENT
Start: 2017-07-07 | End: 2017-07-14

## 2017-07-07 NOTE — PROGRESS NOTES
SUBJECTIVE:                                                    Nuria Shelley is a 77 year old female who presents to clinic today for the following health issues:      URINARY TRACT SYMPTOMS      Duration: X a couple weeks    Description  Dysuria urgency     Intensity:  moderate    Accompanying signs and symptoms:  Fever/chills: no   Flank pain no   Nausea and vomiting: no   Vaginal symptoms: none  Abdominal/Pelvic Pain: YES    History  History of frequent UTI's: YES  History of kidney stones: no   Sexually Active: no   Possibility of pregnancy: No    Precipitating or alleviating factors: None  Therapies tried and outcome: none     Also having generalized weakness with this as well.     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 Was in and was treated for urinary infection    When she had radiation didn't have much pain.  However a couple days after she had radiation started having back pain left lower quadrant and some bilateral back pain. Initially thought maybe just her regular back pain chronic that she had since she fell in February    No loss of control of bowel or bladder, no numbness or weakness down the legs    Patient has vertigo and in February 2016 had an episode where she fell down the stairs and started having back pain on and off since then.     She's not sure if the pains she is having is from her radiation or her cancer.   Couldn't get in to see her specialist.    She is here however because of bladder trouble and weakness she's been feeling  She wants a cbc and a urinalysis     Past couple of weeks has been having after urinating would have pain after she urinates.  Some increased frequency and urgency  No fevers or chills chest pain or shortness of breath     No fevers or chills chest pain or shortness of breath  No orthopnea pnd or edema  Cough, colds, or  respiratory symptoms: No  Focal numbness or weakness: No  Rash: No  Fever or Chills: No  Weight loss: No  Poor Appetite: No  History of Thyroid problems/thyroid medication compliance: No  Headache or Neck stiffness: No  Joint Pain or Arthralgias: No  Melena/Hematochezia/Hematemesis: No  Depression or Mood changes: Yes: stable no thoughts of harming self or others   Rash: No  Concern about recent tick-bite: No  Concern about recent travel/possible exposure: No    Problem list and histories reviewed & adjusted, as indicated.  Additional history: as documented    Problem list, Medication list, Allergies, and Medical/Social/Surgical histories reviewed in [facility] and updated as appropriate.    ROS:  Constitutional, HEENT, cardiovascular, pulmonary, GI, , musculoskeletal, neuro, skin, endocrine and psych systems are negative, except as otherwise noted.    OBJECTIVE:                                                    /72  Pulse 98  Temp 97.8  F (36.6  C) (Oral)  Wt 183 lb (83 kg)  BMI 30.45 kg/m2  Body mass index is 30.45 kg/(m^2).  GENERAL: healthy, alert and no distress  EYES: Eyes grossly normal to inspection, PERRL and conjunctivae and sclerae normal  HENT: ear canals and TM's normal, nose and mouth without ulcers or lesions  NECK: no adenopathy, no asymmetry, masses, or scars and thyroid normal to palpation  RESP: lungs clear to auscultation - no rales, rhonchi or wheezes  CV: regular rate and rhythm, normal S1 S2, no S3 or S4, no murmur, click or rub, no peripheral edema and peripheral pulses strong  ABDOMEN: soft, some tenderness in left lower quadrant but this is worse with movement and change in position, no hepatosplenomegaly, no masses and bowel sounds normal  MS: no gross musculoskeletal defects noted, no edema  SKIN: no suspicious lesions or rashes  NEURO: Normal strength and tone, mentation intact and speech normal  PSYCH: mentation appears normal, affect normal/bright    Diagnostic Test  Results:  Results for orders placed or performed in visit on 07/07/17 (from the past 24 hour(s))   *UA reflex to Microscopic and Culture ([facility] (except [place] and [place])   Result Value Ref [facility]    Color Urine Yellow     Appearance Urine Slightly Cloudy     Glucose Urine Negative NEG mg/dL    Bilirubin Urine Negative NEG    Ketones Urine Negative NEG mg/dL    Specific Gravity Urine 1.015 1.003 - 1.035    Blood Urine Negative NEG    pH Urine 5.5 5.0 - 7.0 pH    Protein Albumin Urine Negative NEG mg/dL    Urobilinogen Urine 0.2 0.2 - 1.0 EU/dL    Nitrite Urine Negative NEG    Leukocyte Esterase Urine Small (A) NEG    Source Midstream Urine    Urine Microscopic   Result Value Ref [facility]    WBC Urine 2-5 (A) 0 - 2 /HPF    RBC Urine O - 2 0 - 2 /HPF    Bacteria Urine Few (A) NEG /HPF   CBC with platelets differential   Result Value Ref [facility]    WBC 5.4 4.0 - 11.0 10e9/L    RBC Count 4.00 3.8 - 5.2 10e12/L    Hemoglobin 13.2 11.7 - 15.7 g/dL    Hematocrit 39.0 35.0 - 47.0 %    MCV 98 78 - 100 fl    MCH 33.0 26.5 - 33.0 pg    MCHC 33.8 31.5 - 36.5 g/dL    RDW 14.5 10.0 - 15.0 %    Platelet Count 243 150 - 450 10e9/L    Diff Method Automated Method     % Neutrophils 65.0 %    % Lymphocytes 19.0 %    % Monocytes 13.0 %    % Eosinophils 2.0 %    % Basophils 1.0 %    Absolute Neutrophil 3.5 1.6 - 8.3 10e9/L    Absolute Lymphocytes 1.0 0.8 - 5.3 10e9/L    Absolute Monocytes 0.7 0.0 - 1.3 10e9/L    Absolute Eosinophils 0.1 0.0 - 0.7 10e9/L    Absolute Basophils 0.1 0.0 - 0.2 10e9/L        ASSESSMENT/PLAN:                                                        ICD-10-CM    1. Generalized muscle weakness M62.81    2. Fatigue, unspecified type R53.83 *UA reflex to Microscopic and Culture ([facility] (except [place] and [place])     CBC with platelets differential     Comprehensive metabolic panel   3. Dysuria R30.0 cephALEXin (KEFLEX) 500 MG capsule     Urine Culture Aerobic Bacterial    4. Bone metastasis (H) C79.51    5. Abdominal pain, left lower quadrant R10.32          Concern for patient generalized weakness. Recommended EKG and ER evaluation patient however declined.  She is a somewhat difficult historian and slightly avoidant with answering questions directly.  She states she is merely wanting to make sure she doesn't have a uti or anemic  Informed labs were good but rest of labs still pending  On discussion she really feels it's a uti as in the past she's been treated with antibiotic and it gets better  She had some left lower quadrant tenderness in abdomen but the pain appears positional  Discussed obtaining a CT abdominal/pelvis patient declined. Again she is very difficult and avoidant of answering questions directly. She later states this pain is something she's had for months. Given she has Left hip bone mets could be related to this as on exam pain appears very positional.   However discussed ruling out diverticulitis patient again declined.    She plans to follow up with her specialist for her bone mets  Empiric treatment  With keflex  Warned about possible cross-reactivity/allergy of cephalosporins with amoxicillin. Patient did not have any life-threatening reaction to amoxicillin and will be monitoring for any reaction.  Has tolerated cephalosporins in the past.     Alarm signs or symptoms discussed, if present recommend go to ER     Adverse reactions of medications discussed.  Aware to come back in if with worsening symptoms or if no relief despite treatment plan  Patient voiced understanding and had no further questions.     Aware to come back in if with any worsening symptoms, bowel or bladder incontinence, motor or senosry deficits.       >20 minutes of the 45 minute visit was spent counseling the patient on her diagnosis and treatment plan and coordination of her care       MD Harmony Cope MD  [facility]

## 2017-07-07 NOTE — NURSING NOTE
"Chief Complaint   Patient presents with     Fatigue     c/o low blood count or bladder infection       Initial /72  Pulse 98  Temp 97.8  F (36.6  C) (Oral)  Wt 183 lb (83 kg)  BMI 30.45 kg/m2 Estimated body mass index is 30.45 kg/(m^2) as calculated from the following:    Height as of 6/6/17: 5' 5\" (1.651 m).    Weight as of this encounter: 183 lb (83 kg).  Medication Reconciliation: complete   Patricia Coats CMA      "

## 2017-07-09 LAB
ALBUMIN SERPL-MCNC: 4 G/DL (ref 3.4–5)
ALP SERPL-CCNC: 44 U/L (ref 40–150)
ALT SERPL W P-5'-P-CCNC: 24 U/L (ref 0–50)
ANION GAP SERPL CALCULATED.3IONS-SCNC: 8 MMOL/L (ref 3–14)
AST SERPL W P-5'-P-CCNC: 20 U/L (ref 0–45)
BACTERIA SPEC CULT: NORMAL
BILIRUB SERPL-MCNC: 0.2 MG/DL (ref 0.2–1.3)
BUN SERPL-MCNC: 13 MG/DL (ref 7–30)
CALCIUM SERPL-MCNC: 8.8 MG/DL (ref 8.5–10.1)
CHLORIDE SERPL-SCNC: 103 MMOL/L (ref 94–109)
CO2 SERPL-SCNC: 26 MMOL/L (ref 20–32)
CREAT SERPL-MCNC: 0.61 MG/DL (ref 0.52–1.04)
GFR SERPL CREATININE-BSD FRML MDRD: NORMAL ML/MIN/1.7M2
GLUCOSE SERPL-MCNC: 91 MG/DL (ref 70–99)
MICRO REPORT STATUS: NORMAL
POTASSIUM SERPL-SCNC: 3.8 MMOL/L (ref 3.4–5.3)
PROT SERPL-MCNC: 6.8 G/DL (ref 6.8–8.8)
SODIUM SERPL-SCNC: 137 MMOL/L (ref 133–144)
SPECIMEN SOURCE: NORMAL

## 2017-08-07 ENCOUNTER — OFFICE VISIT (OUTPATIENT)
Dept: URGENT CARE | Facility: URGENT CARE | Age: 77
End: 2017-08-07
Payer: COMMERCIAL

## 2017-08-07 VITALS
WEIGHT: 181 LBS | DIASTOLIC BLOOD PRESSURE: 78 MMHG | HEART RATE: 101 BPM | TEMPERATURE: 98.4 F | RESPIRATION RATE: 15 BRPM | OXYGEN SATURATION: 97 % | BODY MASS INDEX: 30.12 KG/M2 | SYSTOLIC BLOOD PRESSURE: 139 MMHG

## 2017-08-07 DIAGNOSIS — R39.9 UTI SYMPTOMS: Primary | ICD-10-CM

## 2017-08-07 DIAGNOSIS — C79.51 BONE METASTASIS: ICD-10-CM

## 2017-08-07 DIAGNOSIS — R82.90 NONSPECIFIC FINDING ON EXAMINATION OF URINE: ICD-10-CM

## 2017-08-07 LAB
ALBUMIN UR-MCNC: NEGATIVE MG/DL
APPEARANCE UR: CLEAR
BACTERIA #/AREA URNS HPF: ABNORMAL /HPF
BILIRUB UR QL STRIP: NEGATIVE
COLOR UR AUTO: YELLOW
GLUCOSE UR STRIP-MCNC: NEGATIVE MG/DL
HGB UR QL STRIP: NEGATIVE
KETONES UR STRIP-MCNC: ABNORMAL MG/DL
LEUKOCYTE ESTERASE UR QL STRIP: ABNORMAL
NITRATE UR QL: NEGATIVE
PH UR STRIP: 5.5 PH (ref 5–7)
RBC #/AREA URNS AUTO: ABNORMAL /HPF (ref 0–2)
SP GR UR STRIP: 1.02 (ref 1–1.03)
URN SPEC COLLECT METH UR: ABNORMAL
UROBILINOGEN UR STRIP-ACNC: 0.2 EU/DL (ref 0.2–1)
WBC #/AREA URNS AUTO: ABNORMAL /HPF (ref 0–2)

## 2017-08-07 PROCEDURE — 81001 URINALYSIS AUTO W/SCOPE: CPT | Performed by: FAMILY MEDICINE

## 2017-08-07 PROCEDURE — 99214 OFFICE O/P EST MOD 30 MIN: CPT | Performed by: FAMILY MEDICINE

## 2017-08-07 PROCEDURE — 87086 URINE CULTURE/COLONY COUNT: CPT | Performed by: FAMILY MEDICINE

## 2017-08-07 RX ORDER — CEFDINIR 300 MG/1
300 CAPSULE ORAL 2 TIMES DAILY
Qty: 20 CAPSULE | Refills: 0 | Status: SHIPPED | OUTPATIENT
Start: 2017-08-07 | End: 2017-10-06

## 2017-08-07 NOTE — PROGRESS NOTES
SUBJECTIVE:                                                    Nuria Shelley is a 77 year old female who presents to clinic today for the following health issues:      URINARY TRACT SYMPTOMS      Duration: on going since April     Description  dysuria, frequency, urgency and odor    Intensity:  moderate    Accompanying signs and symptoms:  Fever/chills: YES, started today   Flank pain no   Nausea and vomiting: no   Vaginal symptoms: none  Abdominal/Pelvic Pain: YES    History  History of frequent UTI's: YES- recently  History of kidney stones: YES- 50 yrs ago  Sexually Active: no   Possibility of pregnancy: No    Precipitating or alleviating factors: None    Therapies tried and outcome: cranberry pills and juice   Outcome: no relief         Problem list and histories reviewed & adjusted, as indicated.  Additional history: as documented    Patient Active Problem List   Diagnosis     IBS (irritable bowel syndrome)     GERD (gastroesophageal reflux disease)     Urinary incontinence     Major depressive disorder, recurrent episode, mild (H)     DUPUYTREN'S CONTRACTURE - right     Hyperlipidemia LDL goal <130     Advanced directives, counseling/discussion     Diverticulosis     COPD (chronic obstructive pulmonary disease) (H)     Chronic nasal congestion     Malignant neoplasm of right female breast, unspecified site of breast     Bone metastasis (H)     Obesity     Past Surgical History:   Procedure Laterality Date     BREAST LUMPECTOMY, RT/LT      Breast Lumpectomy RT/     BUNIONECTOMY RT/LT       C  DELIVERY ONLY       HC EXCISION BREAST LESION, OPEN >=1      bilateral     HYSTERECTOMY, CERVIX STATUS UNKNOWN      partial     SURGICAL HISTORY OF -       Left hand surgery     SURGICAL HISTORY OF -   2005    Left knee surgery       Social History   Substance Use Topics     Smoking status: Former Smoker     Years: 20.00     Types: Cigarettes     Quit date: 1980     Smokeless tobacco:  Never Used     Alcohol use Yes      Comment: ocass     Family History   Problem Relation Age of Onset     HEART DISEASE Mother      DIABETES Mother      d 70 from CAD     CEREBROVASCULAR DISEASE Father      d age 82     HEART DISEASE Maternal Grandmother      DIABETES Brother      HEART DISEASE Brother      MI age 60     HEART DISEASE Sister      d age 52 from MI     DIABETES Sister      Breast Cancer Daughter      age 50     HEART DISEASE Brother      DIABETES Brother      Alzheimer Disease Brother      CANCER Brother      pancreatic ca     Anesthesia Reaction No family hx of          Current Outpatient Prescriptions   Medication Sig Dispense Refill     PARoxetine (PAXIL) 20 MG tablet Take 1 tablet (20 mg) by mouth At Bedtime 90 tablet 0     simvastatin (ZOCOR) 20 MG tablet TAKE ONE TABLET BY MOUTH EVERY NIGHT AT BEDTIME 90 tablet 0     fluticasone (FLONASE) 50 MCG/ACT spray USE ONE TO TWO SPRAYS IN EACH NOSTRIL EVERY DAY 16 g 11     albuterol (PROAIR HFA/PROVENTIL HFA/VENTOLIN HFA) 108 (90 BASE) MCG/ACT Inhaler Inhale 2 puffs into the lungs every 6 hours as needed for shortness of breath / dyspnea 1 Inhaler 12     olopatadine (PATANOL) 0.1 % ophthalmic solution Place 1 drop into both eyes 2 times daily. 1 mL 3     Multiple Vitamin (MULTI-VITAMIN PO) Take  by mouth.       Calcium-Vitamin D (CALCIUM + D PO) Take  by mouth. 1200 mg calcium daily with vitamin D 1000 units daily       Allergies   Allergen Reactions     Erythromycin      unknown     Penicillins Hives     Sulfa Drugs Hives     Macrobid [Nitrofuran Derivatives] Rash     Quinolones Rash     Recent Labs   Lab Test  07/07/17   1839  12/01/16   1229  08/05/16   1240   07/29/15   0841  07/29/14   1630   05/02/13   0906   02/04/11   0831   LDL   --    --   82   --   103  165*   < >   --    < >  140*   HDL   --    --   56   --   81  74   < >   --    < >  62   TRIG   --    --   122   --   57  122   < >   --    < >  89   ALT  24   --    --    --   23  24   < >    --    < >  21   CR  0.61  0.66   --    < >  0.53  0.80   < >   --    < >   --    GFRESTIMATED  >90  Non  GFR Calc    87   --    < >  >90  Non  GFR Calc    84   < >   --    < >   --    GFRESTBLACK  >90   GFR Calc    >90   GFR Calc     --    < >  >90   GFR Calc    84   < >   --    < >   --    POTASSIUM  3.8  4.6   --    --   4.1   --    --    --    < >   --    TSH   --    --    --    --    --    --    --   3.29   --   1.86    < > = values in this interval not displayed.      BP Readings from Last 3 Encounters:   08/07/17 139/78   07/07/17 130/72   06/06/17 122/74    Wt Readings from Last 3 Encounters:   08/07/17 181 lb (82.1 kg)   07/07/17 183 lb (83 kg)   06/06/17 185 lb (83.9 kg)                  Labs reviewed in EPIC          ROS:  Constitutional, HEENT, cardiovascular, pulmonary, gi and gu systems are negative, except as otherwise noted.      OBJECTIVE:   /78  Pulse 101  Temp 98.4  F (36.9  C) (Oral)  Resp 15  Wt 181 lb (82.1 kg)  SpO2 97%  Breastfeeding? No  BMI 30.12 kg/m2  Body mass index is 30.12 kg/(m^2).  GENERAL: alert, no distress and obese  NECK: no adenopathy, no asymmetry, masses, or scars and thyroid normal to palpation  RESP: lungs clear to auscultation - no rales, rhonchi or wheezes  CV: regular rates and rhythm, normal S1 S2, no S3 or S4 and no murmur, click or rub  ABDOMEN: mildly tender lower abdomen, no flank tenderness, no organomegaly noted   MS: no gross musculoskeletal defects noted, no edema    Diagnostic Test Results:  Results for orders placed or performed in visit on 08/07/17   *UA reflex to Microscopic and Culture ([facility] (except [place] and [place])   Result Value Ref Range    Color Urine Yellow     Appearance Urine Clear     Glucose Urine Negative NEG mg/dL    Bilirubin Urine Negative NEG    Ketones Urine Trace (A) NEG mg/dL    Specific Gravity Urine 1.025 1.003 - 1.035  "   Blood Urine Negative NEG    pH Urine 5.5 5.0 - 7.0 pH    Protein Albumin Urine Negative NEG mg/dL    Urobilinogen Urine 0.2 0.2 - 1.0 EU/dL    Nitrite Urine Negative NEG    Leukocyte Esterase Urine Trace (A) NEG    Source Midstream Urine    Urine Microscopic   Result Value Ref Range    WBC Urine 10-25 (A) 0 - 2 /HPF    RBC Urine O - 2 0 - 2 /HPF    Bacteria Urine Few (A) NEG /HPF       ASSESSMENT/PLAN:         ICD-10-CM    1. UTI symptoms R39.9 *UA reflex to Microscopic and Culture ([facility] (except [place] and [place])     Urine Microscopic     Urine Culture Aerobic Bacterial     cefdinir (OMNICEF) 300 MG capsule     CANCELED: Urine Culture Aerobic Bacterial   2. Bone metastasis (H) C79.51     on hormonal therapy currently, following oncology.   3. Nonspecific finding on examination of urine R82.90 Urine Culture Aerobic Bacterial       77 year old female presents with urinary symptoms along with fever and lower abdominal pain. Differentials are broad including UTI and diverticulitis. UA findings discussed. Recommended ER for further evaluation but refused. Would like to try antibiotic first. Omnicef ordered considering allergy history and previous susceptibilities. Following oncology for metastatic bone cancer. Recommended well hydration and Azo. Suggested to go ER if symptoms worsen, patient will be following with PCP otherwise. Written instructions/information provided. Patient understood and in agreement with the above plan. All questions are answered.       MEDICATIONS:   Orders Placed This Encounter   Medications     cefdinir (OMNICEF) 300 MG capsule     Sig: Take 1 capsule (300 mg) by mouth 2 times daily     Dispense:  20 capsule     Refill:  0          - Continue other medications without change    Patient Instructions      * BLADDER INFECTION,Female (Adult)    A bladder infection (\"cystitis\" or \"UTI\") usually causes a constant urge to urinate and a burning when passing urine. Urine " may be cloudy, smelly or dark. There may be pain in the lower abdomen. A bladder infection occurs when bacteria from the vaginal area enter the bladder opening (urethra). This can occur from sexual intercourse, wearing tight clothing, dehydration and other factors.  HOME CARE:  1. Drink lots of fluids (at least 6-8 glasses a day, unless you must restrict fluids for other medical reasons). This will force the medicine into your urinary system and flush the bacteria out of your body. Cranberry juice has been shown to help clear out the bacteria.  2. Avoid sexual intercourse until your symptoms are gone.  3. A bladder infection is treated with antibiotics. You may also be given Pyridium (generic = phenazopyridine) to reduce the burning sensation. This medicine will cause your urine to become a bright orange color. The orange urine may stain clothing. You may wear a pad or panty-liner to protect clothing.  PREVENTING FUTURE INFECTIONS:  1. Always wipe from front to back after a bowel movement.  2. Keep the genital area clean and dry.  3. Drink plenty of fluids each day to avoid dehydration.  4. Urinate right after intercourse to flush out the bladder.  5. Wear cotton underwear and cotton-lined panty hose; avoid tight-fitting pants.  6. If you are on birth control pills and are having frequent bladder infections, discuss with your doctor.  FOLLOW UP: Return to this facility or see your doctor if ALL symptoms are not gone after three days of treatment.  GET PROMPT MEDICAL ATTENTION if any of the following occur:    Fever over 101 F (38.3 C)    No improvement by the third day of treatment    Increasing back or abdominal pain    Repeated vomiting; unable to keep medicine down    Weakness, dizziness or fainting    Vaginal discharge    Pain, redness or swelling in the labia (outer vaginal area)    1048-7212 [employer], [address] [place] [address], [place] [address]. All rights reserved. This information is not intended  as a substitute for professional medical care. Always follow your healthcare professional's instructions.    Diverticulitis    Some people get pouches along the wall of the colon as they get older. The pouches, called diverticuli, usually cause no symptoms. If the pouches become blocked, you can get an infection. This infection is called diverticulitis. It causes pain in your lower abdomen and fever. If not treated, it can become a serious condition, causing an abscess to form inside the pouch. The abscess may block the intestinal tract even or rupture, spreading infection throughout the abdomen.  When treatment is started early, oral antibiotics alone may be enough to cure diverticulitis. This method is tried first. But, if you don't improve or if your condition gets worse while using oral antibiotics, you may need to be admitted to the hospital for IV antibiotics. Severe cases may require surgery.  Home care  The following guidelines will help you care for yourself at home:    During the acute illness, rest and follow your healthcare provider's instructions about diet. Sometimes you will need to follow a clear liquid diet to rest your bowel. Once your symptoms are better, you may be told to follow a low-fiber diet for some time. Include foods like:    Flake cereal, mashed potatoes, pancakes, waffles, pasta, white bread, rice, applesauce, bananas, eggs, fish, poultry, tofu, and cooked soft vegetables    Take antibiotics exactly as instructed. Don't miss any doses or stop taking the medication, even if you feel better.    Monitor your temperature and tell your healthcare provider if you have rising temperatures.  Preventing future attacks  Once you have an episode of diverticulitis, you are at risk for having it again. After you have recovered from this episode, you may be able to lower your risk by eating a high-fiber diet (20 gm/day to 35 gm/day of fiber). This cleans out the colon pouches that already exist and  may prevent new ones from forming. Foods high in fiber include fresh fruits and edible peelings, raw or lightly cooked vegetables, whole grain cereals and breads, dried beans and peas, and bran.  Other steps that can help prevent future attacks include:    Take your medicines, such as antibiotics, as your healthcare provider says.    Drink 6 to 8 glasses of water every day, unless told otherwise.    Use a heating pad or hot water bottle to help abdominal cramping or pain.    Begin an exercise program. Ask your healthcare provider how to get started. You can benefit from simple activities such as walking or gardening.    Treat diarrhea with a bland diet. Start with liquids only; then slowly add fiber over time.    Watch for changes in your bowel movements (constipation to diarrhea). Avoid constipation by eating a high fiber diet and taking a stool softener if needed.    Get plenty of rest and sleep.  Follow-up care  Follow up with your healthcare provider as advised or sooner if you are not getting better in the next 2 days.  When to seek medical advice  Call your healthcare provider right away if any of these occur:    Fever of 100.4 F (38 C) or higher, or as directed by your healthcare provider    Repeated vomiting or swelling of the abdomen    Weakness, dizziness, light-headedness    Pain in your abdomen that gets worse, severe, or spreads to your back    Pain that moves to the right lower abdomen    Rectal bleeding (stools that are red, black or maroon color)    Unexpected vaginal bleeding  Date Last Reviewed: 9/1/2016 2000-2017 [employer]. [address] [place] [address]. All rights reserved. This information is not intended as a substitute for professional medical care. Always follow your healthcare professional's instructions.        Patient Education    Cefdinir Oral capsule    Cefdinir Oral suspension  Cefdinir Oral capsule  What is this medicine?  CEFDINIR (SEF di ner) is a  cephalosporin antibiotic. It is used to treat certain kinds of bacterial infections. It will not work for colds, flu, or other viral infections.  This medicine may be used for other purposes; ask your health care provider or pharmacist if you have questions.  What should I tell my health care provider before I take this medicine?  They need to know if you have any of these conditions:    bleeding problems    kidney disease    stomach or intestine problems (especially colitis)    an unusual or allergic reaction to cefdinir, other cephalosporin antibiotics, penicillin, penicillamine, other foods, dyes or preservatives    pregnant or trying to get pregnant    breast-feeding  How should I use this medicine?  Take this medicine by mouth. Swallow it with a drink of water. Follow the directions on the prescription label. You can take it with or without food. If it upsets your stomach it may help to take it with food. Take your doses at regular intervals. Do not take it more often than directed. Finish all the medicine you are prescribed even if you think your infection is better.  Talk to your pediatrician regarding the use of this medicine in children. Special care may be needed.  Overdosage: If you think you have taken too much of this medicine contact a poison control center or emergency room at once.  NOTE: This medicine is only for you. Do not share this medicine with others.  What if I miss a dose?  If you miss a dose, take it as soon as you can. If it is almost time for your next dose, take only that dose. Do not take double or extra doses.  What may interact with this medicine?    antacids that contain aluminum or magnesium    iron supplements    other antibiotics    probenecid  This list may not describe all possible interactions. Give your health care provider a list of all the medicines, herbs, non-prescription drugs, or dietary supplements you use. Also tell them if you smoke, drink alcohol, or use illegal  drugs. Some items may interact with your medicine.  What should I watch for while using this medicine?  Tell your doctor or health care professional if your symptoms do not get better in a few days.  If you are diabetic you may get a false-positive result for sugar in your urine. Check with your doctor or health care professional before you change your diet or the dose of your diabetes medicine.  What side effects may I notice from receiving this medicine?  Side effects that you should report to your doctor or health care professional as soon as possible:    allergic reactions like skin rash, itching or hives, swelling of the face, lips, or tongue    breathing problems    fever or chills    redness, blistering, peeling or loosening of the skin, including inside the mouth    seizures    severe or watery diarrhea    sore throat    swollen joints    trouble passing urine or change in the amount of urine    unusual bleeding or bruising    unusually weak or tired  Side effects that usually do not require medical attention (report to your doctor or health care professional if they continue or are bothersome):    constipation    dizziness    gas or heartburn    headache    loss of appetite    nausea or vomiting    stomach pain    stool discoloration    vaginal itching  This list may not describe all possible side effects. Call your doctor for medical advice about side effects. You may report side effects to FDA at 4-881-FDA-3158.  Where should I keep my medicine?  Keep out of the reach of children.  Store at room temperature between 15 and 30 degrees C (59 and 86 degrees F). Throw the medicine away after the expiration date.  NOTE:This sheet is a summary. It may not cover all possible information. If you have questions about this medicine, talk to your doctor, pharmacist, or health care provider. Copyright  2016 [employer]            Eron Dodd MD  [facility]

## 2017-08-07 NOTE — MR AVS SNAPSHOT
"              After Visit Summary   8/7/2017    Nuria Shelley    MRN: [id number]           Patient Information     Date Of Birth          1940        Visit Information        Provider Department      8/7/2017 5:05 PM Eron Dodd MD [facility]        Today's Diagnoses     Bladder pain    -  1    Bone metastasis (H)        Nonspecific finding on examination of urine        UTI symptoms          Care Instructions       * BLADDER INFECTION,Female (Adult)    A bladder infection (\"cystitis\" or \"UTI\") usually causes a constant urge to urinate and a burning when passing urine. Urine may be cloudy, smelly or dark. There may be pain in the lower abdomen. A bladder infection occurs when bacteria from the vaginal area enter the bladder opening (urethra). This can occur from sexual intercourse, wearing tight clothing, dehydration and other factors.  HOME CARE:  1. Drink lots of fluids (at least 6-8 glasses a day, unless you must restrict fluids for other medical reasons). This will force the medicine into your urinary system and flush the bacteria out of your body. Cranberry juice has been shown to help clear out the bacteria.  2. Avoid sexual intercourse until your symptoms are gone.  3. A bladder infection is treated with antibiotics. You may also be given Pyridium (generic = phenazopyridine) to reduce the burning sensation. This medicine will cause your urine to become a bright orange color. The orange urine may stain clothing. You may wear a pad or panty-liner to protect clothing.  PREVENTING FUTURE INFECTIONS:  1. Always wipe from front to back after a bowel movement.  2. Keep the genital area clean and dry.  3. Drink plenty of fluids each day to avoid dehydration.  4. Urinate right after intercourse to flush out the bladder.  5. Wear cotton underwear and cotton-lined panty hose; avoid tight-fitting pants.  6. If you are on birth control pills and are having frequent bladder infections, discuss " with your doctor.  FOLLOW UP: Return to this facility or see your doctor if ALL symptoms are not gone after three days of treatment.  GET PROMPT MEDICAL ATTENTION if any of the following occur:    Fever over 101 F (38.3 C)    No improvement by the third day of treatment    Increasing back or abdominal pain    Repeated vomiting; unable to keep medicine down    Weakness, dizziness or fainting    Vaginal discharge    Pain, redness or swelling in the labia (outer vaginal area)    8664-9118 [employer], [address] [place] [address], [place] [id number]. All rights reserved. This information is not intended as a substitute for professional medical care. Always follow your healthcare professional's instructions.    Diverticulitis    Some people get pouches along the wall of the colon as they get older. The pouches, called diverticuli, usually cause no symptoms. If the pouches become blocked, you can get an infection. This infection is called diverticulitis. It causes pain in your lower abdomen and fever. If not treated, it can become a serious condition, causing an abscess to form inside the pouch. The abscess may block the intestinal tract even or rupture, spreading infection throughout the abdomen.  When treatment is started early, oral antibiotics alone may be enough to cure diverticulitis. This method is tried first. But, if you don't improve or if your condition gets worse while using oral antibiotics, you may need to be admitted to the hospital for IV antibiotics. Severe cases may require surgery.  Home care  The following guidelines will help you care for yourself at home:    During the acute illness, rest and follow your healthcare provider's instructions about diet. Sometimes you will need to follow a clear liquid diet to rest your bowel. Once your symptoms are better, you may be told to follow a low-fiber diet for some time. Include foods like:    Flake cereal, mashed potatoes, pancakes, waffles, pasta, white  bread, rice, applesauce, bananas, eggs, fish, poultry, tofu, and cooked soft vegetables    Take antibiotics exactly as instructed. Don't miss any doses or stop taking the medication, even if you feel better.    Monitor your temperature and tell your healthcare provider if you have rising temperatures.  Preventing future attacks  Once you have an episode of diverticulitis, you are at risk for having it again. After you have recovered from this episode, you may be able to lower your risk by eating a high-fiber diet (20 gm/day to 35 gm/day of fiber). This cleans out the colon pouches that already exist and may prevent new ones from forming. Foods high in fiber include fresh fruits and edible peelings, raw or lightly cooked vegetables, whole grain cereals and breads, dried beans and peas, and bran.  Other steps that can help prevent future attacks include:    Take your medicines, such as antibiotics, as your healthcare provider says.    Drink 6 to 8 glasses of water every day, unless told otherwise.    Use a heating pad or hot water bottle to help abdominal cramping or pain.    Begin an exercise program. Ask your healthcare provider how to get started. You can benefit from simple activities such as walking or gardening.    Treat diarrhea with a bland diet. Start with liquids only; then slowly add fiber over time.    Watch for changes in your bowel movements (constipation to diarrhea). Avoid constipation by eating a high fiber diet and taking a stool softener if needed.    Get plenty of rest and sleep.  Follow-up care  Follow up with your healthcare provider as advised or sooner if you are not getting better in the next 2 days.  When to seek medical advice  Call your healthcare provider right away if any of these occur:    Fever of 100.4 F (38 C) or higher, or as directed by your healthcare provider    Repeated vomiting or swelling of the abdomen    Weakness, dizziness, light-headedness    Pain in your abdomen that gets  worse, severe, or spreads to your back    Pain that moves to the right lower abdomen    Rectal bleeding (stools that are red, black or maroon color)    Unexpected vaginal bleeding  Date Last Reviewed: 9/1/2016 2000-2017 [employer]. [address] [place] [address], [place] [address]. All rights reserved. This information is not intended as a substitute for professional medical care. Always follow your healthcare professional's instructions.        Patient Education    Cefdinir Oral capsule    Cefdinir Oral suspension  Cefdinir Oral capsule  What is this medicine?  CEFDINIR (SEF di ner) is a cephalosporin antibiotic. It is used to treat certain kinds of bacterial infections. It will not work for colds, flu, or other viral infections.  This medicine may be used for other purposes; ask your health care provider or pharmacist if you have questions.  What should I tell my health care provider before I take this medicine?  They need to know if you have any of these conditions:    bleeding problems    kidney disease    stomach or intestine problems (especially colitis)    an unusual or allergic reaction to cefdinir, other cephalosporin antibiotics, penicillin, penicillamine, other foods, dyes or preservatives    pregnant or trying to get pregnant    breast-feeding  How should I use this medicine?  Take this medicine by mouth. Swallow it with a drink of water. Follow the directions on the prescription label. You can take it with or without food. If it upsets your stomach it may help to take it with food. Take your doses at regular intervals. Do not take it more often than directed. Finish all the medicine you are prescribed even if you think your infection is better.  Talk to your pediatrician regarding the use of this medicine in children. Special care may be needed.  Overdosage: If you think you have taken too much of this medicine contact a poison control center or emergency room at once.  NOTE: This medicine is  only for you. Do not share this medicine with others.  What if I miss a dose?  If you miss a dose, take it as soon as you can. If it is almost time for your next dose, take only that dose. Do not take double or extra doses.  What may interact with this medicine?    antacids that contain aluminum or magnesium    iron supplements    other antibiotics    probenecid  This list may not describe all possible interactions. Give your health care provider a list of all the medicines, herbs, non-prescription drugs, or dietary supplements you use. Also tell them if you smoke, drink alcohol, or use illegal drugs. Some items may interact with your medicine.  What should I watch for while using this medicine?  Tell your doctor or health care professional if your symptoms do not get better in a few days.  If you are diabetic you may get a false-positive result for sugar in your urine. Check with your doctor or health care professional before you change your diet or the dose of your diabetes medicine.  What side effects may I notice from receiving this medicine?  Side effects that you should report to your doctor or health care professional as soon as possible:    allergic reactions like skin rash, itching or hives, swelling of the face, lips, or tongue    breathing problems    fever or chills    redness, blistering, peeling or loosening of the skin, including inside the mouth    seizures    severe or watery diarrhea    sore throat    swollen joints    trouble passing urine or change in the amount of urine    unusual bleeding or bruising    unusually weak or tired  Side effects that usually do not require medical attention (report to your doctor or health care professional if they continue or are bothersome):    constipation    dizziness    gas or heartburn    headache    loss of appetite    nausea or vomiting    stomach pain    stool discoloration    vaginal itching  This list may not describe all possible side effects. Call your  doctor for medical advice about side effects. You may report side effects to FDA at 7-954-PBK-7737.  Where should I keep my medicine?  Keep out of the reach of children.  Store at room temperature between 15 and 30 degrees C (59 and 86 degrees F). Throw the medicine away after the expiration date.  NOTE:This sheet is a summary. It may not cover all possible information. If you have questions about this medicine, talk to your doctor, pharmacist, or health care provider. Copyright  2016 [employer]                Follow-ups after your visit        Who to contact     If you have questions or need follow up information about today's clinic visit or your schedule please contact [facility] directly at [phone].  Normal or non-critical lab and imaging results will be communicated to you by [employer], letter or phone within 4 business days after the clinic has received the results. If you do not hear from us within 7 days, please contact the clinic through [employer] or phone. If you have a critical or abnormal lab result, we will notify you by phone as soon as possible.  Submit refill requests through [employer] or call your pharmacy and they will forward the refill request to us. Please allow 3 business days for your refill to be completed.          Additional Information About Your Visit        [employer] Information     [employer] gives you secure access to your electronic health record. If you see a primary care provider, you can also send messages to your care team and make appointments. If you have questions, please call your primary care clinic.  If you do not have a primary care provider, please call [phone] and they will assist you.        Care EveryWhere ID     This is your Care EveryWhere ID. This could be used by other organizations to access your [place] medical records  [id number]        Your Vitals Were     Pulse Temperature Respirations Pulse Oximetry Breastfeeding? BMI (Body Mass Index)     101 98.4  F (36.9  C) (Oral) 15 97% No 30.12 kg/m2       Blood Pressure from Last 3 Encounters:   08/07/17 139/78   07/07/17 130/72   06/06/17 122/74    Weight from Last 3 Encounters:   08/07/17 181 lb (82.1 kg)   07/07/17 183 lb (83 kg)   06/06/17 185 lb (83.9 kg)              We Performed the Following     *UA reflex to Microscopic and Culture ([facility] and [facility] (except [place] and [place])     Urine Culture Aerobic Bacterial     Urine Microscopic          Today's Medication Changes          These changes are accurate as of: 8/7/17  5:51 PM.  If you have any questions, ask your nurse or doctor.               Start taking these medicines.        Dose/Directions    cefdinir 300 MG capsule   Commonly known as:  OMNICEF   Used for:  UTI symptoms   Started by:  Eron Dodd MD        Dose:  300 mg   Take 1 capsule (300 mg) by mouth 2 times daily   Quantity:  20 capsule   Refills:  0            Where to get your medicines      These medications were sent to [facility] [address], [facility] [address]  [address] [address] [address]     Phone:  [phone]     cefdinir 300 MG capsule                Primary Care Provider Office Phone # Fax #    Betty Ham [phone]       [address] [address]        Equal Access to Services     JUDD MENDIOLA AH: Hadii aad ku hadasho Soomaali, waaxda luqadaha, qaybta kaalmada adeegyada, waxay anat jimenez. So [facility] [phone].    ATENCIÓN: Si habla español, tiene a nye disposición servicios gratuitos de asistencia lingüística. Llame al [phone].    We comply with applicable federal civil rights laws and [place] laws. We do not discriminate on the basis of race, color, national origin, age, disability sex, sexual orientation or gender identity.            Thank you!     Thank you for choosing [facility]  for your care. Our goal  is always to provide you with excellent care. Hearing back from our patients is one way we can continue to improve our services. Please take a few minutes to complete the written survey that you may receive in the mail after your visit with us. Thank you!             Your Updated Medication List - Protect others around you: Learn how to safely use, store and throw away your medicines at www.disposemymeds.org.          This list is accurate as of: 8/7/17  5:51 PM.  Always use your most recent med list.                   Brand Name Dispense Instructions for use Diagnosis    albuterol 108 (90 BASE) MCG/ACT Inhaler    PROAIR HFA/PROVENTIL HFA/VENTOLIN HFA    1 Inhaler    Inhale 2 puffs into the lungs every 6 hours as needed for shortness of breath / dyspnea    Chronic obstructive pulmonary disease, unspecified COPD type (H)       CALCIUM + D PO      Take  by mouth. 1200 mg calcium daily with vitamin D 1000 units daily        cefdinir 300 MG capsule    OMNICEF    20 capsule    Take 1 capsule (300 mg) by mouth 2 times daily    UTI symptoms       fluticasone 50 MCG/ACT spray    FLONASE    16 g    USE ONE TO TWO SPRAYS IN EACH NOSTRIL EVERY DAY    Chronic nasal congestion       MULTI-VITAMIN PO      Take  by mouth.        olopatadine 0.1 % ophthalmic solution    PATANOL    1 mL    Place 1 drop into both eyes 2 times daily.    Screening for depression       PARoxetine 20 MG tablet    PAXIL    90 tablet    Take 1 tablet (20 mg) by mouth At Bedtime    Major depressive disorder, recurrent episode, mild (H)       simvastatin 20 MG tablet    ZOCOR    90 tablet    TAKE ONE TABLET BY MOUTH EVERY NIGHT AT BEDTIME    Hyperlipidemia LDL goal <130

## 2017-08-07 NOTE — PATIENT INSTRUCTIONS
"   * BLADDER INFECTION,Female (Adult)    A bladder infection (\"cystitis\" or \"UTI\") usually causes a constant urge to urinate and a burning when passing urine. Urine may be cloudy, smelly or dark. There may be pain in the lower abdomen. A bladder infection occurs when bacteria from the vaginal area enter the bladder opening (urethra). This can occur from sexual intercourse, wearing tight clothing, dehydration and other factors.  HOME CARE:  1. Drink lots of fluids (at least 6-8 glasses a day, unless you must restrict fluids for other medical reasons). This will force the medicine into your urinary system and flush the bacteria out of your body. Cranberry juice has been shown to help clear out the bacteria.  2. Avoid sexual intercourse until your symptoms are gone.  3. A bladder infection is treated with antibiotics. You may also be given Pyridium (generic = phenazopyridine) to reduce the burning sensation. This medicine will cause your urine to become a bright orange color. The orange urine may stain clothing. You may wear a pad or panty-liner to protect clothing.  PREVENTING FUTURE INFECTIONS:  1. Always wipe from front to back after a bowel movement.  2. Keep the genital area clean and dry.  3. Drink plenty of fluids each day to avoid dehydration.  4. Urinate right after intercourse to flush out the bladder.  5. Wear cotton underwear and cotton-lined panty hose; avoid tight-fitting pants.  6. If you are on birth control pills and are having frequent bladder infections, discuss with your doctor.  FOLLOW UP: Return to this facility or see your doctor if ALL symptoms are not gone after three days of treatment.  GET PROMPT MEDICAL ATTENTION if any of the following occur:    Fever over 101 F (38.3 C)    No improvement by the third day of treatment    Increasing back or abdominal pain    Repeated vomiting; unable to keep medicine down    Weakness, dizziness or fainting    Vaginal discharge    Pain, redness or swelling in " the labia (outer vaginal area)    9282-4832 [employer], [address] [place] [address], [place] [address]. All rights reserved. This information is not intended as a substitute for professional medical care. Always follow your healthcare professional's instructions.    Diverticulitis    Some people get pouches along the wall of the colon as they get older. The pouches, called diverticuli, usually cause no symptoms. If the pouches become blocked, you can get an infection. This infection is called diverticulitis. It causes pain in your lower abdomen and fever. If not treated, it can become a serious condition, causing an abscess to form inside the pouch. The abscess may block the intestinal tract even or rupture, spreading infection throughout the abdomen.  When treatment is started early, oral antibiotics alone may be enough to cure diverticulitis. This method is tried first. But, if you don't improve or if your condition gets worse while using oral antibiotics, you may need to be admitted to the hospital for IV antibiotics. Severe cases may require surgery.  Home care  The following guidelines will help you care for yourself at home:    During the acute illness, rest and follow your healthcare provider's instructions about diet. Sometimes you will need to follow a clear liquid diet to rest your bowel. Once your symptoms are better, you may be told to follow a low-fiber diet for some time. Include foods like:    Flake cereal, mashed potatoes, pancakes, waffles, pasta, white bread, rice, applesauce, bananas, eggs, fish, poultry, tofu, and cooked soft vegetables    Take antibiotics exactly as instructed. Don't miss any doses or stop taking the medication, even if you feel better.    Monitor your temperature and tell your healthcare provider if you have rising temperatures.  Preventing future attacks  Once you have an episode of diverticulitis, you are at risk for having it again. After you have recovered from this  episode, you may be able to lower your risk by eating a high-fiber diet (20 gm/day to 35 gm/day of fiber). This cleans out the colon pouches that already exist and may prevent new ones from forming. Foods high in fiber include fresh fruits and edible peelings, raw or lightly cooked vegetables, whole grain cereals and breads, dried beans and peas, and bran.  Other steps that can help prevent future attacks include:    Take your medicines, such as antibiotics, as your healthcare provider says.    Drink 6 to 8 glasses of water every day, unless told otherwise.    Use a heating pad or hot water bottle to help abdominal cramping or pain.    Begin an exercise program. Ask your healthcare provider how to get started. You can benefit from simple activities such as walking or gardening.    Treat diarrhea with a bland diet. Start with liquids only; then slowly add fiber over time.    Watch for changes in your bowel movements (constipation to diarrhea). Avoid constipation by eating a high fiber diet and taking a stool softener if needed.    Get plenty of rest and sleep.  Follow-up care  Follow up with your healthcare provider as advised or sooner if you are not getting better in the next 2 days.  When to seek medical advice  Call your healthcare provider right away if any of these occur:    Fever of 100.4 F (38 C) or higher, or as directed by your healthcare provider    Repeated vomiting or swelling of the abdomen    Weakness, dizziness, light-headedness    Pain in your abdomen that gets worse, severe, or spreads to your back    Pain that moves to the right lower abdomen    Rectal bleeding (stools that are red, black or maroon color)    Unexpected vaginal bleeding  Date Last Reviewed: 9/1/2016 2000-2017 [email or web address]. [address] [place] [address]. All rights reserved. This information is not intended as a substitute for professional medical care. Always follow your healthcare professional's  instructions.        Patient Education    Cefdinir Oral capsule    Cefdinir Oral suspension  Cefdinir Oral capsule  What is this medicine?  CEFDINIR (SEF di ner) is a cephalosporin antibiotic. It is used to treat certain kinds of bacterial infections. It will not work for colds, flu, or other viral infections.  This medicine may be used for other purposes; ask your health care provider or pharmacist if you have questions.  What should I tell my health care provider before I take this medicine?  They need to know if you have any of these conditions:    bleeding problems    kidney disease    stomach or intestine problems (especially colitis)    an unusual or allergic reaction to cefdinir, other cephalosporin antibiotics, penicillin, penicillamine, other foods, dyes or preservatives    pregnant or trying to get pregnant    breast-feeding  How should I use this medicine?  Take this medicine by mouth. Swallow it with a drink of water. Follow the directions on the prescription label. You can take it with or without food. If it upsets your stomach it may help to take it with food. Take your doses at regular intervals. Do not take it more often than directed. Finish all the medicine you are prescribed even if you think your infection is better.  Talk to your pediatrician regarding the use of this medicine in children. Special care may be needed.  Overdosage: If you think you have taken too much of this medicine contact a poison control center or emergency room at once.  NOTE: This medicine is only for you. Do not share this medicine with others.  What if I miss a dose?  If you miss a dose, take it as soon as you can. If it is almost time for your next dose, take only that dose. Do not take double or extra doses.  What may interact with this medicine?    antacids that contain aluminum or magnesium    iron supplements    other antibiotics    probenecid  This list may not describe all possible interactions. Give your health  care provider a list of all the medicines, herbs, non-prescription drugs, or dietary supplements you use. Also tell them if you smoke, drink alcohol, or use illegal drugs. Some items may interact with your medicine.  What should I watch for while using this medicine?  Tell your doctor or health care professional if your symptoms do not get better in a few days.  If you are diabetic you may get a false-positive result for sugar in your urine. Check with your doctor or health care professional before you change your diet or the dose of your diabetes medicine.  What side effects may I notice from receiving this medicine?  Side effects that you should report to your doctor or health care professional as soon as possible:    allergic reactions like skin rash, itching or hives, swelling of the face, lips, or tongue    breathing problems    fever or chills    redness, blistering, peeling or loosening of the skin, including inside the mouth    seizures    severe or watery diarrhea    sore throat    swollen joints    trouble passing urine or change in the amount of urine    unusual bleeding or bruising    unusually weak or tired  Side effects that usually do not require medical attention (report to your doctor or health care professional if they continue or are bothersome):    constipation    dizziness    gas or heartburn    headache    loss of appetite    nausea or vomiting    stomach pain    stool discoloration    vaginal itching  This list may not describe all possible side effects. Call your doctor for medical advice about side effects. You may report side effects to FDA at 1-589-FDA-7500.  Where should I keep my medicine?  Keep out of the reach of children.  Store at room temperature between 15 and 30 degrees C (59 and 86 degrees F). Throw the medicine away after the expiration date.  NOTE:This sheet is a summary. It may not cover all possible information. If you have questions about this medicine, talk to your doctor,  pharmacist, or health care provider. Copyright  2016 [employer]

## 2017-08-08 LAB
BACTERIA SPEC CULT: NORMAL
MICRO REPORT STATUS: NORMAL
SPECIMEN SOURCE: NORMAL

## 2017-08-23 ENCOUNTER — TRANSFERRED RECORDS (OUTPATIENT)
Dept: HEALTH INFORMATION MANAGEMENT | Facility: CLINIC | Age: 77
End: 2017-08-23

## 2017-10-04 NOTE — PROGRESS NOTES
[facility]  [address] [place] [id number]  [phone]  Dept: [phone]    PRE-OP EVALUATION:  Today's date: 10/6/2017    Nuria Shelley (: 1940) presents for pre-operative evaluation assessment as requested by Dr. Oleksandr Allen.  She requires evaluation and anesthesia risk assessment prior to undergoing surgery/procedure for treatment of Liver .  Proposed procedure: Liver Biopsy  Pt has metastatic Breast cancer-Now has Lesion in Liver which is going to be Biopsied    Date of Surgery/ Procedure: 10-10-17  Time of Surgery/ Procedure: 10:00am  Hospital/Surgical Facility: [facility]  Fax number for surgical facility:   Primary Physician: Betty Ham  Type of Anesthesia Anticipated: General    Patient has a Health Care Directive or Living Will:  NO    1. NO - Do you have a history of heart attack, stroke, stent, bypass or surgery on an artery in the head, neck, heart or legs?  2. NO - Do you ever have any pain or discomfort in your chest?  3. NO - Do you have a history of  Heart Failure?  4. YES - ARE YOUR TROUBLED BY SHORTNESS OF BREATH WHEN WALKING ON THE LEVEL, UP A SLIGHT HILL OR AT NIGHT?Pt has Metastatic Breast ca  To Lung and Bone  5. YES - DO YOU CURRENTLY HAVE A COLD, BRONCHITIS OR OTHER RESPIRATORY INFECTION? allergy  6. NO - Do you have a cough, shortness of breath or wheezing?  7. NO - Do you sometimes get pains in the calves of your legs when you walk?  8. NO - Do you or anyone in your family have previous history of blood clots?  9. NO - Do you or does anyone in your family have a serious bleeding problem such as prolonged bleeding following surgeries or cuts?  10. NO - Have you ever had problems with anemia or been told to take iron pills?  11. NO - Have you had any abnormal blood loss such as black, tarry or bloody stools, or abnormal vaginal bleeding?  12. YES - HAVE YOU EVER HAD A BLOOD TRANSFUSION? No complications  13. NO - Have you or any of your  relatives ever had problems with anesthesia?  14. NO - Do you have sleep apnea, excessive snoring or daytime drowsiness?  15. NO - Do you have any prosthetic heart valves?  16. NO - Do you have prosthetic joints?  17. NO - Is there any chance that you may be pregnant?        HPI:                                                      Brief HPI related to upcoming procedure: as above      HYPERLIPIDEMIA - Patient has a long history of significant Hyperlipidemia requiring medication for treatment with recent good control. Patient reports no problems or side effects with the medication.                                                                                                                                                       .  DEPRESSION - Patient has a long history of Depression of moderate severity requiring medication for control with recent symptoms being stable..Current symptoms of depression include stable .                                                                                                                                                                                    .  COPD - Patient has a longstanding history of moderate-severe COPD . Patient has been doing well overall noting stable  and continues on medication regimen consisting of Inhalers without adverse reactions or side effects.                                                                                                         .    MEDICAL HISTORY:                                                    Patient Active Problem List    Diagnosis Date Noted     Hyperlipidemia LDL goal <130 03/15/2011     Priority: High     Major depressive disorder, recurrent episode, mild (H) 02/04/2011     Priority: High     Obesity      Priority: Medium     Malignant neoplasm of right female breast, unspecified site of breast 03/22/2016     Priority: Medium     Bone metastasis (H) 03/22/2016     Priority: Medium     COPD (chronic obstructive  pulmonary disease) (H) 2015     Priority: Medium     Chronic nasal congestion 2015     Priority: Medium     Diverticulosis 2013     Priority: Medium     Advanced directives, counseling/discussion 2012     Priority: Medium     Advance Directive Problem List Overview:   Name Relationship Phone    Primary Health Care Agent            Alternative Health Care Agent          Discussed advance care planning with patient; information given to patient to review. 3/14/2012              IBS (irritable bowel syndrome)      Priority: Medium     GERD (gastroesophageal reflux disease)      Priority: Medium     DUPUYTREN'S CONTRACTURE - right 2011     Priority: Low     Urinary incontinence 2010     Priority: Low      Past Medical History:   Diagnosis Date     Allergic rhinitis      Back strain      Bone metastases (H)      Breast cancer (H) 2008    Right     Constipation      COPD (chronic obstructive pulmonary disease) (H)      Depression      Diverticulosis 2013     GERD (gastroesophageal reflux disease)      IBS (irritable bowel syndrome)      Malignant pleural effusion      Obesity      Urinary incontinence 2010     Urinary stress incontinence      Wrist tendonitis     Left     Past Surgical History:   Procedure Laterality Date     BREAST LUMPECTOMY, RT/LT      Breast Lumpectomy RT/     BUNIONECTOMY RT/LT       C  DELIVERY ONLY       HC EXCISION BREAST LESION, OPEN >=1      bilateral     HYSTERECTOMY, CERVIX STATUS UNKNOWN      partial     SURGICAL HISTORY OF -       Left hand surgery     SURGICAL HISTORY OF -   2005    Left knee surgery     Current Outpatient Prescriptions   Medication Sig Dispense Refill     PARoxetine (PAXIL) 20 MG tablet Take 1 tablet (20 mg) by mouth At Bedtime 90 tablet 1     simvastatin (ZOCOR) 20 MG tablet TAKE ONE TABLET BY MOUTH EVERY NIGHT AT BEDTIME 90 tablet 0     diclofenac (VOLTAREN) 75 MG EC tablet TAKE ONE TABLET BY MOUTH  TWICE A  tablet 0     fluticasone (FLONASE) 50 MCG/ACT spray USE ONE TO TWO SPRAYS IN EACH NOSTRIL EVERY DAY 16 g 11     albuterol (PROAIR HFA/PROVENTIL HFA/VENTOLIN HFA) 108 (90 BASE) MCG/ACT Inhaler Inhale 2 puffs into the lungs every 6 hours as needed for shortness of breath / dyspnea 1 Inhaler 12     Calcium-Vitamin D (CALCIUM + D PO) Take  by mouth. 1200 mg calcium daily with vitamin D 1000 units daily       [DISCONTINUED] PARoxetine (PAXIL) 20 MG tablet Take 1 tablet (20 mg) by mouth At Bedtime 90 tablet 0     [DISCONTINUED] simvastatin (ZOCOR) 20 MG tablet TAKE ONE TABLET BY MOUTH EVERY NIGHT AT BEDTIME 90 tablet 0     olopatadine (PATANOL) 0.1 % ophthalmic solution Place 1 drop into both eyes 2 times daily. (Patient not taking: Reported on 10/6/2017) 1 mL 3     Multiple Vitamin (MULTI-VITAMIN PO) Take  by mouth.       OTC products: None, except as noted above    Allergies   Allergen Reactions     Erythromycin      unknown     Penicillins Hives     Sulfa Drugs Hives     Macrobid [Nitrofuran Derivatives] Rash     Quinolones Rash      Latex Allergy: None    Social History   Substance Use Topics     Smoking status: Former Smoker     Years: 20.00     Types: Cigarettes     Quit date: 1/12/1980     Smokeless tobacco: Never Used     Alcohol use Yes      Comment: ocass     History   Drug Use No       REVIEW OF SYSTEMS:                                                    C: NEGATIVE for fever, chills, change in weight  I: NEGATIVE for worrisome rashes, moles or lesions  E: NEGATIVE for vision changes or irritation  E/M: NEGATIVE for ear, mouth and throat problems  R: NEGATIVE for significant cough or SOB  B: NEGATIVE for masses, tenderness or discharge  CV: NEGATIVE for chest pain, palpitations or peripheral edema  GI: NEGATIVE for nausea, abdominal pain, heartburn, or change in bowel habits  : NEGATIVE for frequency, dysuria, or hematuria  MUSCULOSKELETAL:Bony pain due to mets  N: NEGATIVE for weakness,  "dizziness or paresthesias  E: NEGATIVE for temperature intolerance, skin/hair changes  H: NEGATIVE for bleeding problems  P: NEGATIVE for changes in mood or affect    EXAM:                                                    /84  Pulse 96  Temp 98.4  F (36.9  C) (Oral)  Ht 5' 5\" (1.651 m)  Wt 186 lb (84.4 kg)  SpO2 93%  Breastfeeding? No  BMI 30.95 kg/m2    GENERAL APPEARANCE: mild distress     EYES: EOMI, PERRL     HENT: ear canals and TM's normal and nose and mouth without ulcers or lesions     NECK: no adenopathy, no asymmetry, masses, or scars and thyroid normal to palpation     RESP: lungs clear to auscultation - no rales, rhonchi or wheezes     CV: regular rates and rhythm, normal S1 S2, no S3 or S4 and no murmur, click or rub     ABDOMEN:  soft, nontender, no HSM or masses and bowel sounds normal     MS: extremities normal- no gross deformities noted, no evidence of inflammation in joints, FROM in all extremities.     SKIN: no suspicious lesions or rashes     NEURO: Normal strength and tone, sensory exam grossly normal, mentation intact and speech normal     PSYCH: mentation appears normal. and affect normal/bright     LYMPHATICS: No axillary, cervical, or supraclavicular nodes    DIAGNOSTICS:                                                    EKG: appears normal, NSR, unchanged from previous tracings  HGB is pending     Recent Labs   Lab Test  07/07/17   1839  12/01/16   1229   07/14/15   1339   HGB  13.2  13.6   < >   --    PLT  243  282   < >   --    INR   --    --    --   1.0   NA  137  138   < >   --    POTASSIUM  3.8  4.6   < >   --    CR  0.61  0.66   < >   --     < > = values in this interval not displayed.        IMPRESSION:                                                    Reason for surgery/procedure: as above  Diagnosis/reason for consult: Preop    The proposed surgical procedure is considered INTERMEDIATE risk.    REVISED CARDIAC RISK INDEX  The patient has the following serious " cardiovascular risks for perioperative complications such as (MI, PE, VFib and 3  AV Block):  No serious cardiac risks  INTERPRETATION: 0 risks: Class I (very low risk - 0.4% complication rate)    The patient has the following additional risks for perioperative complications:  No identified additional risks      ICD-10-CM    1. Preop general physical exam Z01.818 EKG 12-lead complete w/read - Clinics   2. Chronic obstructive pulmonary disease, unspecified COPD type (H) J44.9    3. Bone metastasis (H) C79.51    4. Primary malignant neoplasm of right breast with metastasis to other site (H) C50.911     C79.9    5. Major depressive disorder, recurrent episode, mild (H) F33.0 PARoxetine (PAXIL) 20 MG tablet   6. Hyperlipidemia LDL goal <130 E78.5 simvastatin (ZOCOR) 20 MG tablet     Lipid panel reflex to direct LDL   7. At risk for falling Z91.81 Hemoglobin   8. Need for prophylactic vaccination and inoculation against influenza Z23 FLU VACCINE, INCREASED ANTIGEN, PRESV FREE, AGE 65+ [31394]     Vaccine Administration, Initial [79382]       RECOMMENDATIONS:                                                        APPROVAL GIVEN to proceed with proposed procedure       Signed Electronically by: Betty Ham MD    Copy of this evaluation report is provided to requesting physician.    [place] Preop Guidelines

## 2017-10-04 NOTE — PATIENT INSTRUCTIONS
[facility]    If you have any questions regarding to your visit please contact your care team:       Team Red:   Clinic Hours Telephone Number   Dr. Amarilys Hernandez  (pediatrics)  Katalina Lozano NP 7am-7pm  Monday - Thursday   7am-5pm  Fridays  [phone] (fax)    [facility]  [phone]   Urgent Care - [place] and [place] Monday-Friday  [place] - 11am-8pm  Saturday-Sunday  Both sites - 9am-5pm  [phone] - [facility]  [phone] - [place]       What options do I have for visits at the clinic other than the traditional office visit?  To expand how we care for you, many of our providers are utilizing electronic visits (e-visits) and telephone visits, when medically appropriate, for interactions with their patients rather than a visit in the clinic.   We also offer nurse visits for many medical concerns. Just like any other service, we will bill your insurance company for this type of visit based on time spent on the phone with your provider. Not all insurance companies cover these visits. Please check with your medical insurance if this type of visit is covered. You will be responsible for any charges that are not paid by your insurance.      E-visits via [facility]:  generally incur a $35.00 fee.  Telephone visits:  Time spent on the phone: *charged based on time that is spent on the phone in increments of 10 minutes. Estimated cost:   5-10 mins $30.00   11-20 mins. $59.00   21-30 mins. $85.00     As always, Thank you for trusting us with your health care needs!              Before Your Surgery      Call your surgeon if there is any change in your health. This includes signs of a cold or flu (such as a sore throat, runny nose, cough, rash or fever).    Do not smoke, drink alcohol or take over the counter medicine (unless your surgeon or primary care doctor tells you to) for the 24 hours before and after surgery.    If you take  prescribed drugs: Follow your doctor s orders about which medicines to take and which to stop until after surgery.    Eating and drinking prior to surgery: follow the instructions from your surgeon    Take a shower or bath the night before surgery. Use the soap your surgeon gave you to gently clean your skin. If you do not have soap from your surgeon, use your regular soap. Do not shave or scrub the surgery site.  Wear clean pajamas and have clean sheets on your bed.

## 2017-10-06 ENCOUNTER — MEDICAL CORRESPONDENCE (OUTPATIENT)
Dept: HEALTH INFORMATION MANAGEMENT | Facility: CLINIC | Age: 77
End: 2017-10-06

## 2017-10-06 ENCOUNTER — OFFICE VISIT (OUTPATIENT)
Dept: FAMILY MEDICINE | Facility: CLINIC | Age: 77
End: 2017-10-06
Payer: COMMERCIAL

## 2017-10-06 VITALS
TEMPERATURE: 98.4 F | HEART RATE: 96 BPM | DIASTOLIC BLOOD PRESSURE: 84 MMHG | BODY MASS INDEX: 30.99 KG/M2 | HEIGHT: 65 IN | WEIGHT: 186 LBS | SYSTOLIC BLOOD PRESSURE: 146 MMHG | OXYGEN SATURATION: 93 %

## 2017-10-06 DIAGNOSIS — J44.9 CHRONIC OBSTRUCTIVE PULMONARY DISEASE, UNSPECIFIED COPD TYPE (H): ICD-10-CM

## 2017-10-06 DIAGNOSIS — F33.0 MAJOR DEPRESSIVE DISORDER, RECURRENT EPISODE, MILD (H): ICD-10-CM

## 2017-10-06 DIAGNOSIS — C50.911: ICD-10-CM

## 2017-10-06 DIAGNOSIS — Z91.81 AT RISK FOR FALLING: ICD-10-CM

## 2017-10-06 DIAGNOSIS — C79.51 BONE METASTASIS: ICD-10-CM

## 2017-10-06 DIAGNOSIS — Z23 NEED FOR PROPHYLACTIC VACCINATION AND INOCULATION AGAINST INFLUENZA: ICD-10-CM

## 2017-10-06 DIAGNOSIS — Z01.818 PREOP GENERAL PHYSICAL EXAM: Primary | ICD-10-CM

## 2017-10-06 DIAGNOSIS — E78.5 HYPERLIPIDEMIA LDL GOAL <130: ICD-10-CM

## 2017-10-06 LAB
CHOLEST SERPL-MCNC: 161 MG/DL
HDLC SERPL-MCNC: 50 MG/DL
HGB BLD-MCNC: 13.5 G/DL (ref 11.7–15.7)
LDLC SERPL CALC-MCNC: 91 MG/DL
NONHDLC SERPL-MCNC: 111 MG/DL
TRIGL SERPL-MCNC: 101 MG/DL

## 2017-10-06 PROCEDURE — 36415 COLL VENOUS BLD VENIPUNCTURE: CPT | Performed by: FAMILY MEDICINE

## 2017-10-06 PROCEDURE — 80061 LIPID PANEL: CPT | Performed by: FAMILY MEDICINE

## 2017-10-06 PROCEDURE — 85018 HEMOGLOBIN: CPT | Performed by: FAMILY MEDICINE

## 2017-10-06 PROCEDURE — 90662 IIV NO PRSV INCREASED AG IM: CPT | Performed by: FAMILY MEDICINE

## 2017-10-06 PROCEDURE — G0008 ADMIN INFLUENZA VIRUS VAC: HCPCS | Performed by: FAMILY MEDICINE

## 2017-10-06 PROCEDURE — 93000 ELECTROCARDIOGRAM COMPLETE: CPT | Performed by: FAMILY MEDICINE

## 2017-10-06 PROCEDURE — 99214 OFFICE O/P EST MOD 30 MIN: CPT | Mod: 25 | Performed by: FAMILY MEDICINE

## 2017-10-06 RX ORDER — PAROXETINE 20 MG/1
20 TABLET, FILM COATED ORAL AT BEDTIME
Qty: 90 TABLET | Refills: 1 | Status: SHIPPED | OUTPATIENT
Start: 2017-10-06 | End: 2018-06-07

## 2017-10-06 RX ORDER — SIMVASTATIN 20 MG
TABLET ORAL
Qty: 90 TABLET | Refills: 0 | Status: SHIPPED | OUTPATIENT
Start: 2017-10-06 | End: 2018-03-05

## 2017-10-06 ASSESSMENT — ANXIETY QUESTIONNAIRES
5. BEING SO RESTLESS THAT IT IS HARD TO SIT STILL: NOT AT ALL
6. BECOMING EASILY ANNOYED OR IRRITABLE: NOT AT ALL
GAD7 TOTAL SCORE: 4
3. WORRYING TOO MUCH ABOUT DIFFERENT THINGS: SEVERAL DAYS
2. NOT BEING ABLE TO STOP OR CONTROL WORRYING: SEVERAL DAYS
7. FEELING AFRAID AS IF SOMETHING AWFUL MIGHT HAPPEN: NOT AT ALL
1. FEELING NERVOUS, ANXIOUS, OR ON EDGE: SEVERAL DAYS

## 2017-10-06 ASSESSMENT — PATIENT HEALTH QUESTIONNAIRE - PHQ9
5. POOR APPETITE OR OVEREATING: SEVERAL DAYS
SUM OF ALL RESPONSES TO PHQ QUESTIONS 1-9: 5

## 2017-10-06 NOTE — MR AVS SNAPSHOT
After Visit Summary   10/6/2017    Nuria Shelley    MRN: [id number]           Patient Information     Date Of Birth          1940        Visit Information        Provider Department      10/6/2017 11:00 AM Betty Ham MD [facility]        Today's Diagnoses     Preop general physical exam    -  1    At risk for falling        Need for prophylactic vaccination and inoculation against influenza        Chronic obstructive pulmonary disease, unspecified COPD type (H)        Bone metastasis (H)        Primary malignant neoplasm of right breast with metastasis to other site (H)        Major depressive disorder, recurrent episode, mild (H)        Hyperlipidemia LDL goal <130          Care Instructions    [facility]    If you have any questions regarding to your visit please contact your care team:       Team Red:   Clinic Hours Telephone Number   Dr. Amarilys Hernandez  (pediatrics)  Katalina Lozano NP 7am-7pm  Monday - Thursday   7am-5pm  Fridays  [phone] (fax)    Aguilar VEGA  [phone]   Urgent Care - [place] and [place] Monday-Friday  [place] - 11am-8pm  Saturday-Sunday  Both sites - 9am-5pm  [phone] - [facility]  [phone] - [place]       What options do I have for visits at the clinic other than the traditional office visit?  To expand how we care for you, many of our providers are utilizing electronic visits (e-visits) and telephone visits, when medically appropriate, for interactions with their patients rather than a visit in the clinic.   We also offer nurse visits for many medical concerns. Just like any other service, we will bill your insurance company for this type of visit based on time spent on the phone with your provider. Not all insurance companies cover these visits. Please check with your medical insurance if this type of visit is covered. You will be responsible for any  charges that are not paid by your insurance.      E-visits via ChannelMeter:  generally incur a $35.00 fee.  Telephone visits:  Time spent on the phone: *charged based on time that is spent on the phone in increments of 10 minutes. Estimated cost:   5-10 mins $30.00   11-20 mins. $59.00   21-30 mins. $85.00     As always, Thank you for trusting us with your health care needs!              Before Your Surgery      Call your surgeon if there is any change in your health. This includes signs of a cold or flu (such as a sore throat, runny nose, cough, rash or fever).    Do not smoke, drink alcohol or take over the counter medicine (unless your surgeon or primary care doctor tells you to) for the 24 hours before and after surgery.    If you take prescribed drugs: Follow your doctor s orders about which medicines to take and which to stop until after surgery.    Eating and drinking prior to surgery: follow the instructions from your surgeon    Take a shower or bath the night before surgery. Use the soap your surgeon gave you to gently clean your skin. If you do not have soap from your surgeon, use your regular soap. Do not shave or scrub the surgery site.  Wear clean pajamas and have clean sheets on your bed.           Follow-ups after your visit        Who to contact     If you have questions or need follow up information about today's clinic visit or your schedule please contact [facility] directly at [phone].  Normal or non-critical lab and imaging results will be communicated to you by GLO Sciencehart, letter or phone within 4 business days after the clinic has received the results. If you do not hear from us within 7 days, please contact the clinic through GLO Sciencehart or phone. If you have a critical or abnormal lab result, we will notify you by phone as soon as possible.  Submit refill requests through ChannelMeter or call your pharmacy and they will forward the refill request to us. Please allow 3 business days for  "your refill to be completed.          Additional Information About Your Visit        MyChart Information     [employer] gives you secure access to your electronic health record. If you see a primary care provider, you can also send messages to your care team and make appointments. If you have questions, please call your primary care clinic.  If you do not have a primary care provider, please call [phone] and they will assist you.        Care EveryWhere ID     This is your Care EveryWhere ID. This could be used by other organizations to access your [facility] medical records  [id number]        Your Vitals Were     Pulse Temperature Height Pulse Oximetry Breastfeeding? BMI (Body Mass Index)    96 98.4  F (36.9  C) (Oral) 5' 5\" (1.651 m) 93% No 30.95 kg/m2       Blood Pressure from Last 3 Encounters:   10/06/17 146/84   08/07/17 139/78   07/07/17 130/72    Weight from Last 3 Encounters:   10/06/17 186 lb (84.4 kg)   08/07/17 181 lb (82.1 kg)   07/07/17 183 lb (83 kg)              We Performed the Following     EKG 12-lead complete w/read - Clinics     Hemoglobin     Lipid panel reflex to direct LDL          Where to get your medicines      These medications were sent to [facility] - [address]  [address], [place] [address]     Phone:  [phone]     PARoxetine 20 MG tablet    simvastatin 20 MG tablet          Primary Care Provider Office Phone # Fax #    Betty Ham [phone] [address] [place] [address]  [place] [address]        Equal Access to Services     [facility]: Hadii aad ku hadasho Soomaali, waaxda luqadaha, qaybta kaalmada sanaz jean baptiste. So [facility] [phone].    ATENCIÓN: Si habla español, tiene a nye disposición servicios gratuitos de asistencia lingüística. Llame al [phone].    We comply with applicable federal civil rights laws and [place] laws. We do not discriminate on the " basis of race, color, national origin, age, disability, sex, sexual orientation, or gender identity.            Thank you!     Thank you for choosing [facility]  for your care. Our goal is always to provide you with excellent care. Hearing back from our patients is one way we can continue to improve our services. Please take a few minutes to complete the written survey that you may receive in the mail after your visit with us. Thank you!             Your Updated Medication List - Protect others around you: Learn how to safely use, store and throw away your medicines at www.disposemymeds.org.          This list is accurate as of: 10/6/17 12:03 PM.  Always use your most recent med list.                   Brand Name Dispense Instructions for use Diagnosis    albuterol 108 (90 BASE) MCG/ACT Inhaler    PROAIR HFA/PROVENTIL HFA/VENTOLIN HFA    1 Inhaler    Inhale 2 puffs into the lungs every 6 hours as needed for shortness of breath / dyspnea    Chronic obstructive pulmonary disease, unspecified COPD type (H)       CALCIUM + D PO      Take  by mouth. 1200 mg calcium daily with vitamin D 1000 units daily        diclofenac 75 MG EC tablet    VOLTAREN    180 tablet    TAKE ONE TABLET BY MOUTH TWICE A DAY    DDD (degenerative disc disease), lumbar       fluticasone 50 MCG/ACT spray    FLONASE    16 g    USE ONE TO TWO SPRAYS IN EACH NOSTRIL EVERY DAY    Chronic nasal congestion       MULTI-VITAMIN PO      Take  by mouth.        olopatadine 0.1 % ophthalmic solution    PATANOL    1 mL    Place 1 drop into both eyes 2 times daily.    Screening for depression       PARoxetine 20 MG tablet    PAXIL    90 tablet    Take 1 tablet (20 mg) by mouth At Bedtime    Major depressive disorder, recurrent episode, mild (H)       simvastatin 20 MG tablet    ZOCOR    90 tablet    TAKE ONE TABLET BY MOUTH EVERY NIGHT AT BEDTIME    Hyperlipidemia LDL goal <130

## 2017-10-06 NOTE — NURSING NOTE
"Chief Complaint   Patient presents with     Pre-Op Exam       Initial /76  Pulse 96  Temp 98.4  F (36.9  C) (Oral)  Ht 5' 5\" (1.651 m)  Wt 186 lb (84.4 kg)  SpO2 93%  Breastfeeding? No  BMI 30.95 kg/m2 Estimated body mass index is 30.95 kg/(m^2) as calculated from the following:    Height as of this encounter: 5' 5\" (1.651 m).    Weight as of this encounter: 186 lb (84.4 kg).  Medication Reconciliation: complete   Le MULLIGAN MA      "

## 2017-10-06 NOTE — PROGRESS NOTES
Injectable Influenza Immunization Documentation    1.  Is the person to be vaccinated sick today?   No    2. Does the person to be vaccinated have an allergy to a component   of the vaccine?   No    3. Has the person to be vaccinated ever had a serious reaction   to influenza vaccine in the past?   No    4. Has the person to be vaccinated ever had Guillain-Barré syndrome?   No    Form completed by Le MULLIGAN MA

## 2017-10-07 ASSESSMENT — ANXIETY QUESTIONNAIRES: GAD7 TOTAL SCORE: 4

## 2017-10-10 ENCOUNTER — TRANSFERRED RECORDS (OUTPATIENT)
Dept: HEALTH INFORMATION MANAGEMENT | Facility: CLINIC | Age: 77
End: 2017-10-10

## 2017-12-17 ENCOUNTER — OFFICE VISIT (OUTPATIENT)
Dept: URGENT CARE | Facility: URGENT CARE | Age: 77
End: 2017-12-17
Payer: COMMERCIAL

## 2017-12-17 VITALS
HEIGHT: 65 IN | DIASTOLIC BLOOD PRESSURE: 73 MMHG | SYSTOLIC BLOOD PRESSURE: 140 MMHG | OXYGEN SATURATION: 96 % | TEMPERATURE: 97 F | BODY MASS INDEX: 30.2 KG/M2 | HEART RATE: 105 BPM | WEIGHT: 181.25 LBS

## 2017-12-17 DIAGNOSIS — J01.90 ACUTE SINUSITIS WITH SYMPTOMS > 10 DAYS: Primary | ICD-10-CM

## 2017-12-17 PROCEDURE — 99213 OFFICE O/P EST LOW 20 MIN: CPT | Performed by: INTERNAL MEDICINE

## 2017-12-17 RX ORDER — DOXYCYCLINE 100 MG/1
100 CAPSULE ORAL 2 TIMES DAILY
Qty: 28 CAPSULE | Refills: 0 | Status: SHIPPED | OUTPATIENT
Start: 2017-12-17 | End: 2017-12-31

## 2017-12-17 NOTE — MR AVS SNAPSHOT
After Visit Summary   12/17/2017    Nuria Shelley    MRN: [id number]           Patient Information     Date Of Birth          1940        Visit Information        Provider Department      12/17/2017 11:55 AM Janie Rosado MD [facility]        Today's Diagnoses     Acute sinusitis with symptoms > 10 days    -  1       Follow-ups after your visit        Follow-up notes from your care team     Return in about 1 week (around 12/24/2017), or if symptoms worsen or fail to improve, for follow up with primary doctor.      Who to contact     If you have questions or need follow up information about today's clinic visit or your schedule please contact [facility] directly at [phone].  Normal or non-critical lab and imaging results will be communicated to you by MyChart, letter or phone within 4 business days after the clinic has received the results. If you do not hear from us within 7 days, please contact the clinic through valuescopehart or phone. If you have a critical or abnormal lab result, we will notify you by phone as soon as possible.  Submit refill requests through [employer] Prom[employer] or call your pharmacy and they will forward the refill request to us. Please allow 3 business days for your refill to be completed.          Additional Information About Your Visit        MyChart Information     [employer] gives you secure access to your electronic health record. If you see a primary care provider, you can also send messages to your care team and make appointments. If you have questions, please call your primary care clinic.  If you do not have a primary care provider, please call [phone] and they will assist you.        Care EveryWhere ID     This is your Care EveryWhere ID. This could be used by other organizations to access your [facility] medical records  [id number]        Your Vitals Were     Pulse Temperature Height Pulse Oximetry BMI (Body Mass Index)       105 97  F  "(36.1  C) (Oral) 5' 5\" (1.651 m) 96% 30.16 kg/m2        Blood Pressure from Last 3 Encounters:   12/17/17 140/73   10/06/17 146/84   08/07/17 139/78    Weight from Last 3 Encounters:   12/17/17 181 lb 4 oz (82.2 kg)   10/06/17 186 lb (84.4 kg)   08/07/17 181 lb (82.1 kg)              Today, you had the following     No orders found for display         Today's Medication Changes          These changes are accurate as of: 12/17/17 12:48 PM.  If you have any questions, ask your nurse or doctor.               Start taking these medicines.        Dose/Directions    doxycycline monohydrate 100 MG capsule   Used for:  Acute sinusitis with symptoms > 10 days   Started by:  Janie Rosado MD        Dose:  100 mg   Take 1 capsule (100 mg) by mouth 2 times daily for 14 days   Quantity:  28 capsule   Refills:  0         Stop taking these medicines if you haven't already. Please contact your care team if you have questions.     diclofenac 75 MG EC tablet   Commonly known as:  VOLTAREN   Stopped by:  Janie Rosado MD                Where to get your medicines      These medications were sent to [facility] [address] [place] [address]  [address] [place] [address]     Phone:  [phone]     doxycycline monohydrate 100 MG capsule                Primary Care Provider Office Phone # Fax #    Betty Ham [phone] [address] [address]        Equal Access to Services     [facility] : Hadii isidro bowling hadasho Sohina, waaxda luqadaha, qaybta kaalmada adeegyanevaeh, sanaz jimenez. So [facility] [phone].    ATENCIÓN: Si habla español, tiene a nye disposición servicios gratuitos de asistencia lingüística. Llame al [phone].    We comply with applicable federal civil rights laws and [place] laws. We do not discriminate on the basis of race, color, national origin, age, disability, sex, sexual orientation, or gender " identity.            Thank you!     Thank you for choosing [facility]  for your care. Our goal is always to provide you with excellent care. Hearing back from our patients is one way we can continue to improve our services. Please take a few minutes to complete the written survey that you may receive in the mail after your visit with us. Thank you!             Your Updated Medication List - Protect others around you: Learn how to safely use, store and throw away your medicines at www.disposemymeds.org.          This list is accurate as of: 12/17/17 12:48 PM.  Always use your most recent med list.                   Brand Name Dispense Instructions for use Diagnosis    albuterol 108 (90 BASE) MCG/ACT Inhaler    PROAIR HFA/PROVENTIL HFA/VENTOLIN HFA    1 Inhaler    Inhale 2 puffs into the lungs every 6 hours as needed for shortness of breath / dyspnea    Chronic obstructive pulmonary disease, unspecified COPD type (H)       CALCIUM + D PO      Take  by mouth. 1200 mg calcium daily with vitamin D 1000 units daily        doxycycline monohydrate 100 MG capsule     28 capsule    Take 1 capsule (100 mg) by mouth 2 times daily for 14 days    Acute sinusitis with symptoms > 10 days       fluticasone 50 MCG/ACT spray    FLONASE    16 g    USE ONE TO TWO SPRAYS IN EACH NOSTRIL EVERY DAY    Chronic nasal congestion       MULTI-VITAMIN PO      Take  by mouth.        olopatadine 0.1 % ophthalmic solution    PATANOL    1 mL    Place 1 drop into both eyes 2 times daily.    Screening for depression       PARoxetine 20 MG tablet    PAXIL    90 tablet    Take 1 tablet (20 mg) by mouth At Bedtime    Major depressive disorder, recurrent episode, mild (H)       simvastatin 20 MG tablet    ZOCOR    90 tablet    TAKE ONE TABLET BY MOUTH EVERY NIGHT AT BEDTIME    Hyperlipidemia LDL goal <130

## 2017-12-17 NOTE — PROGRESS NOTES
SUBJECTIVE:  Nuria Shelley is an 77 year old female who presents for not feeling well.  Has sinus congestion and getting headaches.  Has post nasal drainage which triggers coughing.  Feels hot and cold sometimes and gets sweats at time.  Temp has been 98.7 for highest.  Sinus sxs seemed to start in mid October, but have worsened over past week or two.  No n/v/d.  No skin rashes.  otc sinus med taken but not helped.  No known exposures.  No recent travel.         has a past medical history of Allergic rhinitis; Back strain; Bone metastases (H); Breast cancer (H) (2008); Constipation; COPD (chronic obstructive pulmonary disease) (H); Depression; Diverticulosis (5/2/2013); GERD (gastroesophageal reflux disease); IBS (irritable bowel syndrome); Malignant pleural effusion; Obesity; Urinary incontinence (11/16/2010); Urinary stress incontinence; and Wrist tendonitis.  Social History     Social History     Marital status:      Spouse name: N/A     Number of children: 4     Years of education: N/A     Occupational History      Retired     Social History Main Topics     Smoking status: Former Smoker     Years: 20.00     Types: Cigarettes     Quit date: 1/12/1980     Smokeless tobacco: Never Used     Alcohol use Yes      Comment: ocass     Drug use: No     Sexual activity: Not Currently     Partners: Male     Birth control/ protection: Post-menopausal     Other Topics Concern     None     Social History Narrative     Family History   Problem Relation Age of Onset     HEART DISEASE Mother      DIABETES Mother      d 70 from CAD     CEREBROVASCULAR DISEASE Father      d age 82     HEART DISEASE Maternal Grandmother      DIABETES Brother      HEART DISEASE Brother      MI age 60     HEART DISEASE Sister      d age 52 from [place]     DIABETES Sister      Breast Cancer Daughter      age 50     HEART DISEASE Brother      DIABETES Brother      Alzheimer Disease Brother      CANCER Brother      pancreatic ca     Anesthesia  "Reaction No family hx of        ALLERGIES:  Erythromycin; Penicillins; Sulfa drugs; Macrobid [nitrofuran derivatives]; and Quinolones    Current Outpatient Prescriptions   Medication     PARoxetine (PAXIL) 20 MG tablet     simvastatin (ZOCOR) 20 MG tablet     fluticasone (FLONASE) 50 MCG/ACT spray     albuterol (PROAIR HFA/PROVENTIL HFA/VENTOLIN HFA) 108 (90 BASE) MCG/ACT Inhaler     Multiple Vitamin (MULTI-VITAMIN PO)     Calcium-Vitamin D (CALCIUM + D PO)     diclofenac (VOLTAREN) 75 MG EC tablet     olopatadine (PATANOL) 0.1 % ophthalmic solution     No current facility-administered medications for this visit.          ROS:  ROS is done and is negative for general, constitutional, eye, ENT, Respiratory, cardiovascular, GI, , Skin, musculoskeletal except as noted elsewhere.      OBJECTIVE:  /73 (BP Location: Left arm, Patient Position: Sitting, Cuff Size: Adult Large)  Pulse 105  Temp 97  F (36.1  C) (Oral)  Ht 5' 5\" (1.651 m)  Wt 181 lb 4 oz (82.2 kg)  SpO2 96%  BMI 30.16 kg/m2  GENERAL APPEARANCE: Alert, in no acute distress  EYES: normal  EARS: External ears normal. Canals clear. TMs with small clear effusions, no erythema  NOSE:moderate yellow discharge, moderately inflamed mucosa and boggy  OROPHARYNX:normal  NECK:No adenopathy,masses or thyromegaly  RESP: normal and clear to auscultation  CV:regular rate and rhythm and no murmurs, clicks, or gallops  ABDOMEN: Abdomen soft, non-tender. BS normal. No masses, organomegaly  SKIN: no ulcers, lesions or rash  MUSCULOSKELETAL:Musculoskeletal normal      RESULTS  Results for orders placed or performed in visit on 10/06/17   Hemoglobin   Result Value Ref Range    Hemoglobin 13.5 11.7 - 15.7 g/dL   Lipid panel reflex to direct LDL   Result Value Ref Range    Cholesterol 161 <200 mg/dL    Triglycerides 101 <150 mg/dL    HDL Cholesterol 50 >49 mg/dL    LDL Cholesterol Calculated 91 <100 mg/dL    Non HDL Cholesterol 111 <130 mg/dL   .  No results found for " this or any previous visit (from the past 48 hour(s)).    ASSESSMENT/PLAN:    ASSESSMENT / PLAN:  (J01.90) Acute sinusitis with symptoms > 10 days  (primary encounter diagnosis)  Comment: pt has some allergies, so will tx with doxy.  Plan: doxycycline monohydrate 100 MG capsule        Reviewed medication instructions and side effects. Follow up if experiences side effects.. I reviewed supportive care, expected course, and signs of concern.  Follow up as needed or if she does not improve within 7 day(s) or if worsens in any way.  Reviewed red flag symptoms and is to go to the ER if experiences any of these.      See [facility] for orders, medications, letters, patient instructions    Janie Rosado M.D.

## 2017-12-17 NOTE — NURSING NOTE
"Chief Complaint   Patient presents with     Sinus Problem     Since October after she had surgery.  She notes sinus pain, teeth hurt, night sweats, short of breath.       Initial /73 (BP Location: Left arm, Patient Position: Sitting, Cuff Size: Adult Large)  Pulse 105  Temp 97  F (36.1  C) (Oral)  Ht 5' 5\" (1.651 m)  Wt 181 lb 4 oz (82.2 kg)  SpO2 96%  BMI 30.16 kg/m2 Estimated body mass index is 30.16 kg/(m^2) as calculated from the following:    Height as of this encounter: 5' 5\" (1.651 m).    Weight as of this encounter: 181 lb 4 oz (82.2 kg).  Medication Reconciliation: boaz FORREST, Certified Medical Assistant (AAMA)December 17, 2017 12:11 PM      "

## 2018-01-29 ENCOUNTER — TRANSFERRED RECORDS (OUTPATIENT)
Dept: HEALTH INFORMATION MANAGEMENT | Facility: CLINIC | Age: 78
End: 2018-01-29

## 2018-02-27 ENCOUNTER — TRANSFERRED RECORDS (OUTPATIENT)
Dept: HEALTH INFORMATION MANAGEMENT | Facility: CLINIC | Age: 78
End: 2018-02-27

## 2018-04-26 ENCOUNTER — TRANSFERRED RECORDS (OUTPATIENT)
Dept: HEALTH INFORMATION MANAGEMENT | Facility: CLINIC | Age: 78
End: 2018-04-26

## 2018-06-16 ENCOUNTER — TRANSFERRED RECORDS (OUTPATIENT)
Dept: HEALTH INFORMATION MANAGEMENT | Facility: CLINIC | Age: 78
End: 2018-06-16

## 2018-06-17 ENCOUNTER — TRANSFERRED RECORDS (OUTPATIENT)
Dept: HEALTH INFORMATION MANAGEMENT | Facility: CLINIC | Age: 78
End: 2018-06-17

## 2018-07-18 NOTE — PROGRESS NOTES
SUBJECTIVE:   Nuria Shelley is a 78 year old female who presents to clinic today for the following health issues:          Hospital Follow-up Visit:    Hospital/Nursing Home/IP Rehab Facility: [facility]  Date of Admission: 6-16-18  Date of Discharge: 6-20-18  Reason(s) for Admission: Left hip surgery            Problems taking medications regularly:  None       Medication changes since discharge: None       Problems adhering to non-medication therapy:  None    Summary of hospitalization:  [facility] discharge summary reviewed  Diagnostic Tests/Treatments reviewed.  Follow up needed: ortho  Other Healthcare Providers Involved in Patient s Care:         None  Update since discharge: improved.     Post Discharge Medication Reconciliation: discharge medications reconciled, continue medications without change.  Plan of care communicated with patient     Coding guidelines for this visit:  Type of Medical   Decision Making Face-to-Face Visit       within 7 Days of discharge Face-to-Face Visit        within 14 days of discharge   Moderate Complexity [id number]   High Complexity [id number]              COPD Follow-Up    Symptoms are currently: stable    Current fatigue or dyspnea with ambulation: none    Shortness of breath: stable    Increased or change in Cough/Sputum: No    Fever(s): No    Baseline ambulation without stopping to rest:  Several  blocks.     Any ER/UC or hospital admissions since your last visit? No     Depression Followup    Status since last visit: Stable     See PHQ-9 for current symptoms.  Other associated symptoms: None    Complicating factors:   Significant life event:  No   Current substance abuse:  None  Anxiety or Panic symptoms:  No    PHQ-9 6/12/2017 10/6/2017 6/8/2018   Total Score 5 5 5   Q9: Suicide Ideation Not at all Not at all Not at all     .  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 Her last PET scan looked better.    History   Smoking Status     Former Smoker     Years: 20.00     Types: Cigarettes     Quit date: 1980   Smokeless Tobacco     Never Used     Lab Results   Component Value Date    FEV1 69 2015    INJ7RME 123 2015         Problem list and histories reviewed & adjusted, as indicated.      Patient Active Problem List   Diagnosis     IBS (irritable bowel syndrome)     GERD (gastroesophageal reflux disease)     Urinary incontinence     Major depressive disorder, recurrent episode, mild (H)     DUPUYTREN'S CONTRACTURE - right     Hyperlipidemia LDL goal <130     Advanced directives, counseling/discussion     Diverticulosis     COPD (chronic obstructive pulmonary disease) (H)     Chronic nasal congestion     Malignant neoplasm of right female breast, unspecified site of breast     Bone metastasis (H)     Fracture of hip, left, closed, initial encounter (H)     Past Surgical History:   Procedure Laterality Date     BREAST LUMPECTOMY, RT/LT      Breast Lumpectomy RT/     BUNIONECTOMY RT/LT       C  DELIVERY ONLY       HC EXCISION BREAST LESION, OPEN >=1      bilateral     HYSTERECTOMY, CERVIX STATUS UNKNOWN      partial     SURGICAL HISTORY OF -       Left hand surgery     SURGICAL HISTORY OF -   2005    Left knee surgery       Social History   Substance Use Topics     Smoking status: Former Smoker     Years: 20.00     Types: Cigarettes     Quit date: 1980     Smokeless tobacco: Never Used     Alcohol use Yes      Comment: ocass     Family History   Problem Relation Age of Onset     HEART DISEASE Mother      Diabetes Mother      d 70 from CAD     Cerebrovascular Disease Father      d age 82     HEART DISEASE Maternal Grandmother      Diabetes Brother      HEART DISEASE Brother      MI age 60     HEART DISEASE Sister      d age 52 from MI     Diabetes Sister      Breast Cancer Daughter       age 50     HEART DISEASE Brother      Diabetes Brother      Alzheimer Disease Brother      Cancer Brother      pancreatic ca     Anesthesia Reaction No family hx of          Current Outpatient Prescriptions   Medication Sig Dispense Refill     aspirin 325 MG EC tablet Take 325 mg by mouth daily       Calcium-Vitamin D (CALCIUM + D PO) Take  by mouth. 1200 mg calcium daily with vitamin D 1000 units daily       fluticasone (FLONASE) 50 MCG/ACT spray Spray 2 sprays in nostril daily       fluticasone (FLONASE) 50 MCG/ACT spray USE ONE TO TWO SPRAYS IN EACH NOSTRIL EVERY DAY 16 g 11     Misc. Devices (WALKER AUTO GLIDES) MISC 2 Wheeled Walker for home use. For 6 weeks.       Multiple Vitamin (MULTI-VITAMIN PO) Take  by mouth.       senna-docusate (SENOKOT-S;PERICOLACE) 8.6-50 MG per tablet Take 1 tablet by mouth two times daily       sertraline (ZOLOFT) 25 MG tablet Take 1 tablet (25 mg) by mouth daily 30 tablet 0     simvastatin (ZOCOR) 20 MG tablet TAKE ONE TABLET BY MOUTH EVERY DAY AT BEDTIME 90 tablet 1     tiotropium (SPIRIVA HANDIHALER) 18 MCG capsule Inhale contents of one capsule daily.       VENTOLIN  (90 BASE) MCG/ACT Inhaler INHALE 2 PUFFS INTO THE LUNGS EVERY 6 HOURS AS NEEDED FOR SHORTNESS OF BREATH/DYSPNEA 18 g 12     anastrozole (ARIMIDEX) 1 MG tablet Take 1 mg by mouth daily       fluticasone-salmeterol (ADVAIR DISKUS) 100-50 MCG/DOSE diskus inhaler Inhale 1 puff into the lungs two times daily       olopatadine (PATANOL) 0.1 % ophthalmic solution Place 1 drop into both eyes 2 times daily. (Patient not taking: Reported on 10/6/2017) 1 mL 3     polyethylene glycol (MIRALAX/GLYCOLAX) Packet Take 1 packet by mouth daily       Allergies   Allergen Reactions     Erythromycin      unknown     Penicillins Hives     Sulfa Drugs Hives     Macrobid [Nitrofuran Derivatives] Rash     Quinolones Rash     Recent Labs   Lab Test  10/06/17   1205  07/07/17   1839  12/01/16   1229  08/05/16   1240   07/29/15   0841   "07/29/14   1630   05/02/13   0906   02/04/11   0831   LDL  91   --    --   82   --   103  165*   < >   --    < >  140*   HDL  50   --    --   56   --   81  74   < >   --    < >  62   TRIG  101   --    --   122   --   57  122   < >   --    < >  89   ALT   --   24   --    --    --   23  24   < >   --    < >  21   CR   --   0.61  0.66   --    < >  0.53  0.80   < >   --    < >   --    GFRESTIMATED   --   >90  Non  GFR Calc    87   --    < >  >90  Non  GFR Calc    84   < >   --    < >   --    GFRESTBLACK   --   >90   GFR Calc    >90   GFR Calc     --    < >  >90   GFR Calc    84   < >   --    < >   --    POTASSIUM   --   3.8  4.6   --    --   4.1   --    --    --    < >   --    TSH   --    --    --    --    --    --    --    --   3.29   --   1.86    < > = values in this interval not displayed.      BP Readings from Last 3 Encounters:   07/19/18 136/82   12/17/17 140/73   10/06/17 146/84    Wt Readings from Last 3 Encounters:   07/19/18 183 lb (83 kg)   12/17/17 181 lb 4 oz (82.2 kg)   10/06/17 186 lb (84.4 kg)                  Labs reviewed in EPIC    Reviewed and updated as needed this visit by clinical staff  Tobacco  Allergies  Meds       Reviewed and updated as needed this visit by Provider  Allergies  Meds         ROS:  CONSTITUTIONAL: NEGATIVE for fever, chills, change in weight  ENT/MOUTH: NEGATIVE for ear, mouth and throat problems  RESP: NEGATIVE for significant cough or SOB  CV: NEGATIVE for chest pain, palpitations or peripheral edema  GI: NEGATIVE for nausea, abdominal pain, heartburn, or change in bowel habits  MUSCULOSKELETAL: as above  HEME/ALLERGY/IMMUNE: NEGATIVE for bleeding problems    OBJECTIVE:     /82  Pulse 98  Temp 98.3  F (36.8  C) (Oral)  Resp 20  Ht 5' 5\" (1.651 m)  Wt 183 lb (83 kg)  SpO2 91%  Breastfeeding? No  BMI 30.45 kg/m2  Body mass index is 30.45 kg/(m^2).  GENERAL: alert and no " distress  EYES: Eyes grossly normal to inspection, PERRL and conjunctivae and sclerae normal  NECK: no adenopathy, no asymmetry, masses, or scars and thyroid normal to palpation  RESP: lungs clear to auscultation - no rales, rhonchi or wheezes  CV: regular rate and rhythm, normal S1 S2, no S3 or S4, no murmur, click or rub, no peripheral edema and peripheral pulses strong  ABDOMEN: soft, nontender, no hepatosplenomegaly, no masses and bowel sounds normal  MS: no gross musculoskeletal defects noted, no edema  SKIN: no suspicious lesions or rashes  NEURO: Normal strength and tone, mentation intact and speech normal  PSYCH: mentation appears normal, affect normal/bright  Incision left Hip healing well    Diagnostic Test Results:  pend    ASSESSMENT/PLAN:       3  Major depressive disorder, recurrent episode, mild (H)  Stable   - sertraline (ZOLOFT) 25 MG tablet; Take 1 tablet (25 mg) by mouth daily  Dispense: 30 tablet; Refill: 0    2. Chronic obstructive pulmonary disease, unspecified COPD type (H)  Stable   - tiotropium (SPIRIVA HANDIHALER) 18 MCG capsule; Inhale contents of one capsule daily.    1. Fracture of hip, left, closed, initial encounter (H)  Doing well    4. Left leg swelling  Will get a Doppler to rule out DVT  - US Lower Extremity Venous Duplex Left; Future    5. Bone metastasis (H)  Sees Dr Aquino     6. Breast cancer metastasized to bone, right (H)  As above    Pt is receiving PT/OT /skilled Nursing from Home care agency due to her Hip Fracture    Betty Ham MD  [facility]

## 2018-07-19 ENCOUNTER — OFFICE VISIT (OUTPATIENT)
Dept: FAMILY MEDICINE | Facility: CLINIC | Age: 78
End: 2018-07-19
Payer: COMMERCIAL

## 2018-07-19 ENCOUNTER — TRANSFERRED RECORDS (OUTPATIENT)
Dept: HEALTH INFORMATION MANAGEMENT | Facility: CLINIC | Age: 78
End: 2018-07-19

## 2018-07-19 ENCOUNTER — TELEPHONE (OUTPATIENT)
Dept: FAMILY MEDICINE | Facility: CLINIC | Age: 78
End: 2018-07-19

## 2018-07-19 VITALS
WEIGHT: 183 LBS | SYSTOLIC BLOOD PRESSURE: 136 MMHG | HEIGHT: 65 IN | TEMPERATURE: 98.3 F | DIASTOLIC BLOOD PRESSURE: 82 MMHG | RESPIRATION RATE: 20 BRPM | OXYGEN SATURATION: 91 % | BODY MASS INDEX: 30.49 KG/M2 | HEART RATE: 98 BPM

## 2018-07-19 DIAGNOSIS — C50.911 BREAST CANCER METASTASIZED TO BONE, RIGHT (H): ICD-10-CM

## 2018-07-19 DIAGNOSIS — J44.9 CHRONIC OBSTRUCTIVE PULMONARY DISEASE, UNSPECIFIED COPD TYPE (H): ICD-10-CM

## 2018-07-19 DIAGNOSIS — F33.0 MAJOR DEPRESSIVE DISORDER, RECURRENT EPISODE, MILD (H): Primary | ICD-10-CM

## 2018-07-19 DIAGNOSIS — C79.51 BREAST CANCER METASTASIZED TO BONE, RIGHT (H): ICD-10-CM

## 2018-07-19 DIAGNOSIS — M79.89 LEFT LEG SWELLING: ICD-10-CM

## 2018-07-19 DIAGNOSIS — S72.002A FRACTURE OF HIP, LEFT, CLOSED, INITIAL ENCOUNTER (H): ICD-10-CM

## 2018-07-19 DIAGNOSIS — C79.51 BONE METASTASIS: ICD-10-CM

## 2018-07-19 PROCEDURE — 99214 OFFICE O/P EST MOD 30 MIN: CPT | Performed by: FAMILY MEDICINE

## 2018-07-19 RX ORDER — FLUTICASONE PROPIONATE 50 MCG
2 SPRAY, SUSPENSION (ML) NASAL DAILY
COMMUNITY
Start: 2018-06-21 | End: 2019-09-17

## 2018-07-19 RX ORDER — AMOXICILLIN 250 MG
1 CAPSULE ORAL
COMMUNITY
Start: 2018-06-17 | End: 2019-10-28

## 2018-07-19 RX ORDER — TIOTROPIUM BROMIDE 18 UG/1
CAPSULE ORAL; RESPIRATORY (INHALATION)
COMMUNITY
Start: 2018-07-19 | End: 2021-01-19

## 2018-07-19 RX ORDER — SERTRALINE HYDROCHLORIDE 25 MG/1
25 TABLET, FILM COATED ORAL DAILY
Qty: 30 TABLET | Refills: 0 | Status: SHIPPED | OUTPATIENT
Start: 2018-07-19 | End: 2019-08-28

## 2018-07-19 RX ORDER — POLYETHYLENE GLYCOL 3350 17 G/17G
1 POWDER, FOR SOLUTION ORAL DAILY
COMMUNITY
End: 2022-01-01

## 2018-07-19 RX ORDER — ANASTROZOLE 1 MG/1
1 TABLET ORAL DAILY
COMMUNITY
End: 2022-01-01

## 2018-07-19 NOTE — TELEPHONE ENCOUNTER
Talked to patient, she has an appointment scheduled for today 7/19/2018 at 5:00 at [facility],   Thank you  Naima

## 2018-07-19 NOTE — MR AVS SNAPSHOT
After Visit Summary   7/19/2018    Nuria Shelley    MRN: [id number]           Patient Information     Date Of Birth          1940        Visit Information        Provider Department      7/19/2018 12:00 PM Betty Ham MD [facility]        Today's Diagnoses     Major depressive disorder, recurrent episode, mild (H)    -  1    Chronic obstructive pulmonary disease, unspecified COPD type (H)        Fracture of hip, left, closed, initial encounter (H)        Left leg swelling          Care Instructions    [facility]    If you have any questions regarding to your visit please contact your care team:       Team Red:   Clinic Hours Telephone Number   Dr. Amarilys Lozano, NP   7am-7pm  Monday - Thursday   7am-5pm  Fridays  [phone]  (Appointment scheduling available 24/7)    Questions about your recent visit?   Team Line  [phone]   Urgent Care - [place] and [facility] - 11am-9pm Monday-Friday Saturday-Sunday- 9am-5pm   [place] - 5pm-9pm Monday-Friday Saturday-Sunday- 9am-5pm  [phone] - [place]  [phone] - [place]       What options do I have for a visit other than an office visit? We offer electronic visits (e-visits) and telephone visits, when medically appropriate.  Please check with your medical insurance to see if these types of visits are covered, as you will be responsible for any charges that are not paid by your insurance.      You can use AMVONET (secure electronic communication) to access to your chart, send your primary care provider a message, or make an appointment. Ask a team member how to get started.     For a price quote for your services, please call our Consumer Price Line at [phone] or our Imaging Cost estimation line at [phone] (for imaging tests).              Follow-ups after your visit        Future tests that were ordered for you today     Open  "Future Orders        Priority Expected Expires Ordered    US Lower Extremity Venous Duplex Left STAT  7/19/2019 7/19/2018            Who to contact     If you have questions or need follow up information about today's clinic visit or your schedule please contact [facility] directly at [phone].  Normal or non-critical lab and imaging results will be communicated to you by MyChart, letter or phone within 4 business days after the clinic has received the results. If you do not hear from us within 7 days, please contact the clinic through [employer] or phone. If you have a critical or abnormal lab result, we will notify you by phone as soon as possible.  Submit refill requests through TheraCell or call your pharmacy and they will forward the refill request to us. Please allow 3 business days for your refill to be completed.          Additional Information About Your Visit        MyChart Information     TheraCell gives you secure access to your electronic health record. If you see a primary care provider, you can also send messages to your care team and make appointments. If you have questions, please call your primary care clinic.  If you do not have a primary care provider, please call [phone] and they will assist you.        Care EveryWhere ID     This is your Care EveryWhere ID. This could be used by other organizations to access your [place] medical records  [id number]        Your Vitals Were     Pulse Temperature Respirations Height Pulse Oximetry Breastfeeding?    98 98.3  F (36.8  C) (Oral) 20 5' 5\" (1.651 m) 91% No    BMI (Body Mass Index)                   30.45 kg/m2            Blood Pressure from Last 3 Encounters:   07/19/18 140/80   12/17/17 140/73   10/06/17 146/84    Weight from Last 3 Encounters:   07/19/18 183 lb (83 kg)   12/17/17 181 lb 4 oz (82.2 kg)   10/06/17 186 lb (84.4 kg)                 Today's Medication Changes          These changes are accurate as of 7/19/18 12:37 PM.  " If you have any questions, ask your nurse or doctor.               Start taking these medicines.        Dose/Directions    sertraline 25 MG tablet   Commonly known as:  ZOLOFT   Used for:  Major depressive disorder, recurrent episode, mild (H)   Started by:  Betty Ham MD        Dose:  25 mg   Take 1 tablet (25 mg) by mouth daily   Quantity:  30 tablet   Refills:  0         Stop taking these medicines if you haven't already. Please contact your care team if you have questions.     PARoxetine 20 MG tablet   Commonly known as:  PAXIL   Stopped by:  Betty Ham MD                Where to get your medicines      These medications were sent to [facility] [place] - [address]  [address], [facility] [address] [address]     Phone:  [phone]     sertraline 25 MG tablet                Primary Care Provider Office Phone # Fax #    Betty Ham [phone] [address] [address]        Equal Access to Services     [facility] AH: Hadii aad ku hadasho Soomaali, waaxda luqadaha, qaybta kaalmada adeegyada, waxay idiin haysimonn lesa dumontarawinnie baires . So [facility] [phone].    ATENCIÓN: Si habla español, tiene a nye disposición servicios gratuitos de asistencia lingüística. [facility] [phone].    We comply with applicable federal civil rights laws and [place] laws. We do not discriminate on the basis of race, color, national origin, age, disability, sex, sexual orientation, or gender identity.            Thank you!     Thank you for choosing [facility]  for your care. Our goal is always to provide you with excellent care. Hearing back from our patients is one way we can continue to improve our services. Please take a few minutes to complete the written survey that you may receive in the mail after your visit with us. Thank you!             Your Updated Medication List - Protect others around you: Learn how to safely use, store  and throw away your medicines at www.disposemymeds.org.          This list is accurate as of 7/19/18 12:37 PM.  Always use your most recent med list.                   Brand Name Dispense Instructions for use Diagnosis    ADVAIR DISKUS 100-50 MCG/DOSE diskus inhaler   Generic drug:  fluticasone-salmeterol      Inhale 1 puff into the lungs two times daily        anastrozole 1 MG tablet    ARIMIDEX     Take 1 mg by mouth daily        aspirin 325 MG EC tablet      Take 325 mg by mouth daily        CALCIUM + D PO      Take  by mouth. 1200 mg calcium daily with vitamin D 1000 units daily        * fluticasone 50 MCG/ACT spray    FLONASE    16 g    USE ONE TO TWO SPRAYS IN EACH NOSTRIL EVERY DAY    Chronic nasal congestion       * fluticasone 50 MCG/ACT spray    FLONASE     Spray 2 sprays in nostril daily        MULTI-VITAMIN PO      Take  by mouth.        olopatadine 0.1 % ophthalmic solution    PATANOL    1 mL    Place 1 drop into both eyes 2 times daily.    Screening for depression       polyethylene glycol Packet    MIRALAX/GLYCOLAX     Take 1 packet by mouth daily        senna-docusate 8.6-50 MG per tablet    SENOKOT-S;PERICOLACE     Take 1 tablet by mouth two times daily        sertraline 25 MG tablet    ZOLOFT    30 tablet    Take 1 tablet (25 mg) by mouth daily    Major depressive disorder, recurrent episode, mild (H)       simvastatin 20 MG tablet    ZOCOR    90 tablet    TAKE ONE TABLET BY MOUTH EVERY DAY AT BEDTIME    Hyperlipidemia LDL goal <130       SPIRIVA HANDIHALER 18 MCG capsule   Generic drug:  tiotropium      Inhale contents of one capsule daily.    Chronic obstructive pulmonary disease, unspecified COPD type (H)       VENTOLIN  (90 Base) MCG/ACT Inhaler   Generic drug:  albuterol     18 g    INHALE 2 PUFFS INTO THE LUNGS EVERY 6 HOURS AS NEEDED FOR SHORTNESS OF BREATH/DYSPNEA    Chronic obstructive pulmonary disease, unspecified COPD type (H)       Walker Auto Glides Misc      2 Wheeled Walker  for home use. For 6 weeks.        * Notice:  This list has 2 medication(s) that are the same as other medications prescribed for you. Read the directions carefully, and ask your doctor or other care provider to review them with you.

## 2018-07-19 NOTE — NURSING NOTE
Patient was in today 7/19/2018 and needed a hold and call venous doppler ultrasound, patient was unable to do it today.   I gave patient the phone number and told her to schedule an appointment as soon as possible as it is serious.   Patient stated she will call and schedule an appointment as soon as she can.  Thank you  Naima

## 2018-07-19 NOTE — PATIENT INSTRUCTIONS
[facility]    If you have any questions regarding to your visit please contact your care team:       Team Red:   Clinic Hours Telephone Number   Dr. Amarilys Lozano, NP   7am-7pm  Monday - Thursday   7am-5pm  Fridays  [phone]  (Appointment scheduling available 24/7)    Questions about your recent visit?   Team Line  [phone]   Urgent Care - [place] and [facility] - 11am-9pm Monday-Friday Saturday-Sunday- 9am-5pm   [place] - 5pm-9pm Monday-Friday Saturday-Sunday- 9am-5pm  [phone] - [place]  [phone] - [place]       What options do I have for a visit other than an office visit? We offer electronic visits (e-visits) and telephone visits, when medically appropriate.  Please check with your medical insurance to see if these types of visits are covered, as you will be responsible for any charges that are not paid by your insurance.      You can use [employer] (secure electronic communication) to access to your chart, send your primary care provider a message, or make an appointment. Ask a team member how to get started.     For a price quote for your services, please call our Consumer Price Line at [phone] or our Imaging Cost estimation line at [phone] (for imaging tests).

## 2018-07-19 NOTE — Clinical Note
Please check I had asked pt to do a venous doppler-I do not see she has had one Please check how she is doing

## 2018-07-19 NOTE — TELEPHONE ENCOUNTER
Called patient regarding Ultrasound.   Patient was in today 7/19/2018 and PCP wanted a hold and call venous doppler ultrasound patient was unable to do it today.   PCP wanted me to call patient to see if they got it scheduled and for when.  If patient returns call please ask her if she scheduled the ultrasound.   Please let patient know it is serious and she needs to do it as soon as possible.    Thank you  Naima

## 2018-07-19 NOTE — LETTER
My COPD Action Plan     Name: Nuria Shelley    YOB: 1940   Date: 7/19/2018    My doctor: Betty Ham MD   My clinic: [address] [address] [phone]  My Controller Medicine: advair   Dose: see medicines sheet     My Rescue Medicine: Albuterol (Proair/Ventolin/Proventil) inhaler   Dose: see medicines sheet     My Flare Up Medicine: Prednisone   Dose:  FEV-1 (no units)   Date Value   06/19/2015 69     FEV1/FVC (no units)   Date Value   06/19/2015 123      My COPD Severity: Moderate = FeV1 < 79% -50%      Use of Oxygen: Oxygen Not Prescribed      Make sure you've had your pneumonia   vaccines.          GREEN ZONE       Doing well today      Usual level of activity and exercise    Usual amount of cough and mucus    No shortness of breath    Usual level of health (thinking clearly, sleeping well, feel like eating) Actions:      Take daily medicines    Use oxygen as prescribed    Follow regular exercise and diet plan    Avoid cigarette smoke and other irritants that harm the lungs           YELLOW ZONE          Having a bad day or flare up      Short of breath more than usual    A lot more sputum (mucus) than usual    Sputum looks yellow, green, tan, brown or bloody    More coughing or wheezing    Fever or chills    Less energy; trouble completing activities    Trouble thinking or focusing    Using quick relief inhaler or nebulizer more often    Poor sleep; symptoms wake me up    Do not feel like eating Actions:      Get plenty of rest    Take daily medicines    Use quick relief inhaler every albuterol q 4     hours    If you use oxygen, call you doctor to see if you should adjust your oxygen    Do breathing exercises or other things to help you relax    Let a loved one, friend or neighbor know you are feeling worse    Call your care team if you have 2 or more symptoms.  Start taking steroids or antibiotics if directed by your care team            RED ZONE       Need medical care now      Severe shortness of breath (feel you can't breathe)    Fever, chills    Not enough breath to do any activity    Trouble coughing up mucus, walking or talking    Blood in mucus    Frequent coughing   Rescue medicines are not working    Not able to sleep because of breathing    Feel confused or drowsy    Chest pain    Actions:      Call your health care team.  If you cannot reach your care team, call 911 or go to the emergency room.        Annual Reminders:  Meet with Care Team, Flu Shot every Fall  Pharmacy:    [facility]  [address] [facility] [address]  [address] [address]NAN[address]  [address] [facility] [facility] [address]

## 2018-07-24 ENCOUNTER — TELEPHONE (OUTPATIENT)
Dept: FAMILY MEDICINE | Facility: CLINIC | Age: 78
End: 2018-07-24

## 2018-07-24 NOTE — TELEPHONE ENCOUNTER
Message  Received: Today       Betty Ham MD  P Fz Team Red                   Please check I had asked pt to do a venous doppler-I do not see she has had one   Please check how she is doing

## 2018-07-25 NOTE — TELEPHONE ENCOUNTER
Noted that the appointment for the US on 7/19/18 was cancelled.  Called patient.   She was a bit confused at first but then recalled that she had gone to [facility] for the ultrasound.  She did not recall exact location and date.    Results from [facility] obtained- placed in Dr. Ham's basket  US of left LE from 7/19/18 is negative for DVT    Mary Anne Galarza RN

## 2018-07-25 NOTE — TELEPHONE ENCOUNTER
Spoke to patient and informed her of below message from provider. She would like provider to know she went to her surgeon and she has a clean bill of health.  Amanda LARSON CMA ([facility])

## 2018-07-30 ENCOUNTER — TELEPHONE (OUTPATIENT)
Dept: FAMILY MEDICINE | Facility: CLINIC | Age: 78
End: 2018-07-30

## 2018-08-03 ENCOUNTER — TELEPHONE (OUTPATIENT)
Dept: FAMILY MEDICINE | Facility: CLINIC | Age: 78
End: 2018-08-03

## 2018-08-03 NOTE — TELEPHONE ENCOUNTER
Spoke with pt. States she can hobble around. Has a 4 morgan and a cane with pads on it. No fever. Spot seems to be shrinking. The size of her hand. No bruise. Asked her what she meant by spot and she said, like if you had a muscle pulled. No numbness or tingling. States the fat is pulled up a bit, but it was that way from the stitches. Is only having pain if she steps on it too long. When she steps down, the pain is at 8-9/10. That's when she is exhausted and is time to sit down for awhile. Recommended she be seen in UC or ER. Pt states she wants to wait and see if this improves and will go in if she needs to.    Amanda Padilla RN  [facility], [place]

## 2018-08-03 NOTE — TELEPHONE ENCOUNTER
Reason for call:  Patient reporting a symptom    Symptom or request: patient states that she had hip surgery 06/16/18. Patient took out the trash Monday afternoon. In the middle of the night, patient woke up and states she started having pain in that operative leg. It hurts when she puts weight on it and asking for a nurse to return the call to discuss further. Patient states  Her granddaughter  Tried calling the orthopedic surgeon but patient is saying she is so confused she is unsure who to call.    Duration (how long have symptoms been present): 3 days    Have you been treated for this before? Yes    Additional comments: NA    Phone Number patient can be reached at:  Home number on file [phone] (home)    Best Time:  Any time    Can we leave a detailed message on this number:  YES    Call taken on 8/3/2018 at 1:43 PM by Malini Hdz

## 2018-08-06 ENCOUNTER — TRANSFERRED RECORDS (OUTPATIENT)
Dept: HEALTH INFORMATION MANAGEMENT | Facility: CLINIC | Age: 78
End: 2018-08-06

## 2018-08-06 ENCOUNTER — TELEPHONE (OUTPATIENT)
Dept: FAMILY MEDICINE | Facility: CLINIC | Age: 78
End: 2018-08-06

## 2018-08-06 NOTE — TELEPHONE ENCOUNTER
Reason for Call:  Verbal orders    Orders are needed for this patient. Yes, restart home health aide services needs new order to admit back to home care for bathing care.    PT: alison    OT: alison    Skilled Nursing: home health aide    Pt Provider: Jitendra    Phone Number Homecare Nurse can be reached at: [phone]    Can we leave a detailed message on this number? YES    Phone number patient can be reached at:   na*    Best Time: any    Call taken on 8/6/2018 at 2:34 PM by Ava Rosario

## 2018-08-06 NOTE — TELEPHONE ENCOUNTER
Called Brady with verbal ok for home care orders per RN protocol.    Amanda Padilla RN  [facility], [place]

## 2018-08-07 DIAGNOSIS — Z53.9 DIAGNOSIS NOT YET DEFINED: Primary | ICD-10-CM

## 2018-08-07 PROCEDURE — G0180 MD CERTIFICATION HHA PATIENT: HCPCS | Performed by: FAMILY MEDICINE

## 2018-08-08 ENCOUNTER — TRANSFERRED RECORDS (OUTPATIENT)
Dept: HEALTH INFORMATION MANAGEMENT | Facility: CLINIC | Age: 78
End: 2018-08-08

## 2018-08-09 ENCOUNTER — MEDICAL CORRESPONDENCE (OUTPATIENT)
Dept: HEALTH INFORMATION MANAGEMENT | Facility: CLINIC | Age: 78
End: 2018-08-09

## 2018-09-13 ENCOUNTER — MEDICAL CORRESPONDENCE (OUTPATIENT)
Dept: HEALTH INFORMATION MANAGEMENT | Facility: CLINIC | Age: 78
End: 2018-09-13

## 2018-09-24 ENCOUNTER — TRANSFERRED RECORDS (OUTPATIENT)
Dept: HEALTH INFORMATION MANAGEMENT | Facility: CLINIC | Age: 78
End: 2018-09-24

## 2018-09-24 LAB
ALT SERPL-CCNC: 16 IU/L (ref 8–45)
AST SERPL-CCNC: 18 IU/L (ref 2–40)
INR PPP: 1.3

## 2018-09-26 LAB
CREAT SERPL-MCNC: 0.61 MG/DL (ref 0.57–1.11)
GFR SERPL CREATININE-BSD FRML MDRD: >60 ML/MIN/1.73M2
GLUCOSE SERPL-MCNC: 127 MG/DL (ref 65–100)
POTASSIUM SERPL-SCNC: 3.8 MMOL/L (ref 3.5–5)

## 2018-09-28 ENCOUNTER — TELEPHONE (OUTPATIENT)
Dept: FAMILY MEDICINE | Facility: CLINIC | Age: 78
End: 2018-09-28

## 2018-09-28 NOTE — TELEPHONE ENCOUNTER
Pt was discharged from hospital to TCU.   Will f/u with pt when she is discharged from TCU.     Tatiana Patel RN

## 2018-09-28 NOTE — TELEPHONE ENCOUNTER
This patient was discharged from [facility] on 9/27/2018.    Discharge Diagnosis: Left hip pain/ fever    A follow-up visit has not been scheduled.     Number of ED/ER visits in the last 12 months:       Please follow-up with patient.    Charlee Bull,

## 2018-10-12 ENCOUNTER — TRANSFERRED RECORDS (OUTPATIENT)
Dept: HEALTH INFORMATION MANAGEMENT | Facility: CLINIC | Age: 78
End: 2018-10-12

## 2018-10-15 ENCOUNTER — TRANSFERRED RECORDS (OUTPATIENT)
Dept: HEALTH INFORMATION MANAGEMENT | Facility: CLINIC | Age: 78
End: 2018-10-15

## 2018-11-12 ENCOUNTER — MEDICAL CORRESPONDENCE (OUTPATIENT)
Dept: HEALTH INFORMATION MANAGEMENT | Facility: CLINIC | Age: 78
End: 2018-11-12

## 2018-11-13 ENCOUNTER — MEDICAL CORRESPONDENCE (OUTPATIENT)
Dept: HEALTH INFORMATION MANAGEMENT | Facility: CLINIC | Age: 78
End: 2018-11-13

## 2018-11-16 DIAGNOSIS — Z53.9 DIAGNOSIS NOT YET DEFINED: Primary | ICD-10-CM

## 2018-11-16 PROCEDURE — G0180 MD CERTIFICATION HHA PATIENT: HCPCS | Performed by: FAMILY MEDICINE

## 2018-11-27 NOTE — PROGRESS NOTES
SUBJECTIVE:   Nuria Shelley is a 78 year old female who presents to clinic today for the following health issues:    Hospital Follow-up Visit  Hospital/Nursing Home/ Rehab Facility: [facility]  Date of Admission: 09/28/2018  Date of Discharge: 10/30/2018  Reason(s) for Admission: Broke hip, infection, and surgery  Per [facility] Notes     78 y.o. Female with history of breast cancer with metastasis to bone, who had a fall with left hip fracture on 6/16/2018 treated with [facility] hospitalized on 9/25/2018 with confusion and pain left hip when she walked. Aspirate grew MRSA and veillonella.   She was on oral clindamycin while awaiting culture. With the positive culture for MRSA, I recommended a washout of the hip to decrease bacterial counts. dr.. Barillas did the washout on 10/15/2018. She was discharged on oral minocycline and rifampin.        Problems taking medications regularly:  None       Medication changes since discharge: antibiotics        Problems adhering to non-medication therapy:  None    Summary of hospitalization:  CareEverywhere information obtained and reviewed  Diagnostic Tests/Treatments reviewed.  Follow up needed: ortho and ID  Other Healthcare Providers Involved in Patient s Care:         None  Update since discharge: improved.     Post Discharge Medication Reconciliation: discharge medications reconciled, continue medications without change.  Plan of care communicated with patient     Coding guidelines for this visit:  Type of Medical   Decision Making Face-to-Face Visit       within 7 Days of discharge Face-to-Face Visit        within 14 days of discharge   Moderate Complexity 68028 [id number]   High Complexity 10827 [id number]            Problem list and histories reviewed & adjusted, as indicated.  Additional history: as documented    Patient Active Problem List   Diagnosis     IBS (irritable bowel syndrome)     GERD (gastroesophageal reflux disease)     Urinary incontinence      Major depressive disorder, recurrent episode, mild (H)     DUPUYTREN'S CONTRACTURE - right     Hyperlipidemia LDL goal <130     Advanced directives, counseling/discussion     Diverticulosis     COPD (chronic obstructive pulmonary disease) (H)     Chronic nasal congestion     Malignant neoplasm of right female breast, unspecified site of breast     Bone metastasis (H)     Fracture of hip, left, closed, initial encounter (H)     Metastatic breast cancer (H)     Past Surgical History:   Procedure Laterality Date     BREAST LUMPECTOMY, RT/LT      Breast Lumpectomy RT/     BUNIONECTOMY RT/LT       C  DELIVERY ONLY       HC EXCISION BREAST LESION, OPEN >=1      bilateral     HYSTERECTOMY, CERVIX STATUS UNKNOWN      partial     SURGICAL HISTORY OF -       Left hand surgery     SURGICAL HISTORY OF -   2005    Left knee surgery       Social History   Substance Use Topics     Smoking status: Former Smoker     Years: 20.00     Types: Cigarettes     Quit date: 1980     Smokeless tobacco: Never Used     Alcohol use Yes      Comment: ocass     Family History   Problem Relation Age of Onset     Heart Disease Mother      Diabetes Mother      d 70 from CAD     Cerebrovascular Disease Father      d age 82     Heart Disease Maternal Grandmother      Diabetes Brother      Heart Disease Brother      MI age 60     Heart Disease Sister      d age 52 from MI     Diabetes Sister      Breast Cancer Daughter      age 50     Heart Disease Brother      Diabetes Brother      Alzheimer Disease Brother      Cancer Brother      pancreatic ca     Anesthesia Reaction No family hx of          Current Outpatient Prescriptions   Medication Sig Dispense Refill     anastrozole (ARIMIDEX) 1 MG tablet Take 1 mg by mouth daily       aspirin 325 MG EC tablet Take 325 mg by mouth daily       Calcium-Vitamin D (CALCIUM + D PO) Take  by mouth. 1200 mg calcium daily with vitamin D 1000 units daily       diclofenac (VOLTAREN) 75 MG EC  tablet Take 1 tablet (75 mg) by mouth 2 times daily 180 tablet 0     fluticasone (FLONASE) 50 MCG/ACT spray Spray 2 sprays in nostril daily       fluticasone (FLONASE) 50 MCG/ACT spray USE ONE TO TWO SPRAYS IN EACH NOSTRIL EVERY DAY 16 g 11     fluticasone-salmeterol (ADVAIR DISKUS) 100-50 MCG/DOSE diskus inhaler Inhale 1 puff into the lungs two times daily       HYDROcodone-acetaminophen (NORCO) 5-325 MG tablet Take 1 tablet by mouth daily 25 tablet 0     minocycline (MINOCIN/DYNACIN) 100 MG capsule TK 1 C PO BID  0     Misc. Devices (WALKER AUTO GLIDES) MISC 2 Wheeled Walker for home use. For 6 weeks.       Multiple Vitamin (MULTI-VITAMIN PO) Take  by mouth.       nystatin POWD        olopatadine (PATANOL) 0.1 % ophthalmic solution Place 1 drop into both eyes 2 times daily. 1 mL 3     PARoxetine (PAXIL) 20 MG tablet TAKE ONE TABLET BY MOUTH EVERY DAY AT BEDTIME 90 tablet 1     polyethylene glycol (MIRALAX/GLYCOLAX) Packet Take 1 packet by mouth daily       rifampin (RIFADIN) 300 MG capsule TK 1 C PO BID BEFORE MEALS FOR 90 DAYS  0     senna-docusate (SENOKOT-S;PERICOLACE) 8.6-50 MG per tablet Take 1 tablet by mouth two times daily       sertraline (ZOLOFT) 25 MG tablet Take 1 tablet (25 mg) by mouth daily 30 tablet 0     tiotropium (SPIRIVA HANDIHALER) 18 MCG capsule Inhale contents of one capsule daily.       VENTOLIN  (90 BASE) MCG/ACT Inhaler INHALE 2 PUFFS INTO THE LUNGS EVERY 6 HOURS AS NEEDED FOR SHORTNESS OF BREATH/DYSPNEA 18 g 12     [DISCONTINUED] simvastatin (ZOCOR) 20 MG tablet TAKE ONE TABLET BY MOUTH EVERY DAY AT BEDTIME 90 tablet 1     simvastatin (ZOCOR) 20 MG tablet TAKE ONE TABLET BY MOUTH EVERY DAY AT BEDTIME 90 tablet 3     Allergies   Allergen Reactions     Nickel Rash     Dust Mite Extract      Erythromycin      unknown     No Clinical Screening - See Comments Hives     Penicillins Hives     Sulfa Drugs Hives     Macrobid [Nitrofuran Derivatives] Rash     Nitrofurantoin Rash      Quinolones Rash     Recent Labs   Lab Test  11/30/18   1520 09/26/18 09/24/18  10/06/17   1205  07/07/17   1839   08/05/16   1240   07/29/15   0841   05/02/13   0906   02/04/11   0831   LDL  109*   --    --   91   --    --   82   --   103   < >   --    < >  140*   HDL  61   --    --   50   --    --   56   --   81   < >   --    < >  62   TRIG  132   --    --   101   --    --   122   --   57   < >   --    < >  89   ALT   --    --   16   --   24   --    --    --   23   < >   --    < >  21   CR   --   0.61   --    --   0.61   < >   --    < >  0.53   < >   --    < >   --    GFRESTIMATED   --   >60   --    --   >90  Non  GFR Calc     < >   --    < >  >90  Non  GFR Calc     < >   --    < >   --    GFRESTBLACK   --   >60   --    --   >90   GFR Calc     < >   --    < >  >90   GFR Calc     < >   --    < >   --    POTASSIUM   --   3.8   --    --   3.8   < >   --    --   4.1   --    --    < >   --    TSH   --    --    --    --    --    --    --    --    --    --   3.29   --   1.86    < > = values in this interval not displayed.      BP Readings from Last 3 Encounters:   11/30/18 132/84   07/19/18 136/82   12/17/17 140/73    Wt Readings from Last 3 Encounters:   11/30/18 171 lb (77.6 kg)   07/19/18 183 lb (83 kg)   12/17/17 181 lb 4 oz (82.2 kg)                  Labs reviewed in EPIC    Reviewed and updated as needed this visit by clinical staff  Tobacco  Allergies  Meds  Med Hx  Surg Hx  Fam Hx  Soc Hx      Reviewed and updated as needed this visit by Provider       ROS:  CONSTITUTIONAL: NEGATIVE for fever, chills, change in weight  RESP: NEGATIVE for significant cough or SOB  CV: NEGATIVE for chest pain, palpitations or peripheral edema  GI: NEGATIVE for nausea, abdominal pain, heartburn, or change in bowel habits  : negative for dysuria, hematuria, decreased urinary stream, erectile dysfunction  HEME/ALLERGY/IMMUNE: NEGATIVE for bleeding  "problems  PSYCHIATRIC: NEGATIVE for changes in mood or affect  ROS otherwise negative    OBJECTIVE:     /84 (BP Location: Left arm, Patient Position: Chair, Cuff Size: Adult Regular)  Pulse 110  Temp 96.5  F (35.8  C) (Oral)  Resp 14  Ht 5' 5\" (1.651 m)  Wt 171 lb (77.6 kg)  SpO2 95%  Breastfeeding? No  BMI 28.46 kg/m2  Body mass index is 28.46 kg/(m^2).  GENERAL: healthy, alert and no distress  NECK: no adenopathy, no asymmetry, masses, or scars and thyroid normal to palpation  RESP: lungs clear to auscultation - no rales, rhonchi or wheezes  CV: regular rate and rhythm, normal S1 S2, no S3 or S4, no murmur, click or rub, no peripheral edema and peripheral pulses strong  ABDOMEN: soft, nontender, no hepatosplenomegaly, no masses and bowel sounds normal  MS: walks slowly with a cane  PSYCH: mentation appears normal    Diagnostic Test Results:  Pending      ASSESSMENT/PLAN:       1. Major depressive disorder, recurrent episode, mild (H)  Discussed changing to a different ssri as Paxil can cause dizziness -Pt says she will do it in a few Months  Risk discussed   - PARoxetine (PAXIL) 20 MG tablet; TAKE ONE TABLET BY MOUTH EVERY DAY AT BEDTIME  Dispense: 90 tablet; Refill: 1    2. DDD (degenerative disc disease), lumbar      - diclofenac (VOLTAREN) 75 MG EC tablet; Take 1 tablet (75 mg) by mouth 2 times daily  Dispense: 180 tablet; Refill: 0    3. History of total left hip arthroplasty    - HYDROcodone-acetaminophen (NORCO) 5-325 MG tablet; Take 1 tablet by mouth daily  Dispense: 25 tablet; Refill: 0  Pt needs PT/ OT/Home care due to her arthroplasty  DDD   She has a unsteady gait  Needs skilled Nursing  4. MRSA infection  Sees ID     5. Chronic obstructive pulmonary disease, unspecified COPD type (H)  Stable     6. Metastatic breast cancer (H)  Sees Oncology    7. Need for prophylactic vaccination and inoculation against influenza  Advised   Follow up 3 months  Pt needs PT/ OT/Home care due to her " arthroplasty  DDD   She has a unsteady gait  HOME CARE CERTIFICATION   Pt  Has  been advised Home Health  She uses a walker   Home Health Certification/Face to Face Attestation: I certify this patient is under my care. I had a qualifying face to face encounter with this patient during this visit.     Date of the Face to Face Encounter:  11-30-18    I certify, based on my findings, the following services are medically necessary home health services: Skilled Nursing     My clinical findings support the need for the above services for this patient because of: Activity intolerance and Significant weakness and decreased strength/endurance    I certify that my clinical findings support that this patient is homebound because: Patient needs help with Going out and uses cane    Patient requires the assistance of a walker in order to leave the home.            Betty Ham MD  [facility]

## 2018-11-29 ENCOUNTER — TRANSFERRED RECORDS (OUTPATIENT)
Dept: HEALTH INFORMATION MANAGEMENT | Facility: CLINIC | Age: 78
End: 2018-11-29

## 2018-11-30 ENCOUNTER — TELEPHONE (OUTPATIENT)
Dept: FAMILY MEDICINE | Facility: CLINIC | Age: 78
End: 2018-11-30

## 2018-11-30 ENCOUNTER — OFFICE VISIT (OUTPATIENT)
Dept: FAMILY MEDICINE | Facility: CLINIC | Age: 78
End: 2018-11-30
Payer: COMMERCIAL

## 2018-11-30 VITALS
OXYGEN SATURATION: 95 % | WEIGHT: 171 LBS | SYSTOLIC BLOOD PRESSURE: 132 MMHG | BODY MASS INDEX: 28.49 KG/M2 | RESPIRATION RATE: 14 BRPM | HEIGHT: 65 IN | HEART RATE: 110 BPM | DIASTOLIC BLOOD PRESSURE: 84 MMHG | TEMPERATURE: 96.5 F

## 2018-11-30 DIAGNOSIS — F33.0 MAJOR DEPRESSIVE DISORDER, RECURRENT EPISODE, MILD (H): Primary | ICD-10-CM

## 2018-11-30 DIAGNOSIS — C50.919 METASTATIC BREAST CANCER: ICD-10-CM

## 2018-11-30 DIAGNOSIS — M51.369 DDD (DEGENERATIVE DISC DISEASE), LUMBAR: ICD-10-CM

## 2018-11-30 DIAGNOSIS — A49.02 MRSA INFECTION: ICD-10-CM

## 2018-11-30 DIAGNOSIS — J44.9 CHRONIC OBSTRUCTIVE PULMONARY DISEASE, UNSPECIFIED COPD TYPE (H): ICD-10-CM

## 2018-11-30 DIAGNOSIS — L30.4 INTERTRIGO: Primary | ICD-10-CM

## 2018-11-30 DIAGNOSIS — Z96.642 HISTORY OF TOTAL LEFT HIP ARTHROPLASTY: ICD-10-CM

## 2018-11-30 DIAGNOSIS — Z23 NEED FOR PROPHYLACTIC VACCINATION AND INOCULATION AGAINST INFLUENZA: ICD-10-CM

## 2018-11-30 LAB
CHOLEST SERPL-MCNC: 196 MG/DL
HDLC SERPL-MCNC: 61 MG/DL
LDLC SERPL CALC-MCNC: 109 MG/DL
NONHDLC SERPL-MCNC: 135 MG/DL
TRIGL SERPL-MCNC: 132 MG/DL

## 2018-11-30 PROCEDURE — 80061 LIPID PANEL: CPT | Performed by: FAMILY MEDICINE

## 2018-11-30 PROCEDURE — 99214 OFFICE O/P EST MOD 30 MIN: CPT | Mod: 25 | Performed by: FAMILY MEDICINE

## 2018-11-30 PROCEDURE — 36415 COLL VENOUS BLD VENIPUNCTURE: CPT | Performed by: FAMILY MEDICINE

## 2018-11-30 PROCEDURE — G0008 ADMIN INFLUENZA VIRUS VAC: HCPCS | Performed by: FAMILY MEDICINE

## 2018-11-30 PROCEDURE — 90662 IIV NO PRSV INCREASED AG IM: CPT | Performed by: FAMILY MEDICINE

## 2018-11-30 RX ORDER — NYSTATIN 10B UNIT
POWDER (EA) MISCELLANEOUS
COMMUNITY
End: 2019-10-08

## 2018-11-30 RX ORDER — MINOCYCLINE HYDROCHLORIDE 100 MG/1
CAPSULE ORAL
Refills: 0 | COMMUNITY
Start: 2018-11-17

## 2018-11-30 RX ORDER — HYDROCODONE BITARTRATE AND ACETAMINOPHEN 5; 325 MG/1; MG/1
1 TABLET ORAL DAILY
Qty: 25 TABLET | Refills: 0 | Status: SHIPPED | OUTPATIENT
Start: 2018-11-30 | End: 2021-09-22

## 2018-11-30 RX ORDER — PAROXETINE 20 MG/1
TABLET, FILM COATED ORAL
Qty: 90 TABLET | Refills: 1 | Status: SHIPPED | OUTPATIENT
Start: 2018-11-30 | End: 2019-06-06

## 2018-11-30 RX ORDER — RIFAMPIN 300 MG/1
CAPSULE ORAL
Refills: 0 | COMMUNITY
Start: 2018-11-20 | End: 2022-01-01

## 2018-11-30 RX ORDER — DICLOFENAC SODIUM 75 MG/1
75 TABLET, DELAYED RELEASE ORAL 2 TIMES DAILY
Qty: 180 TABLET | Refills: 0 | Status: SHIPPED | OUTPATIENT
Start: 2018-11-30 | End: 2019-09-17

## 2018-11-30 RX ORDER — HYDROCODONE BITARTRATE AND ACETAMINOPHEN 5; 325 MG/1; MG/1
2-3 TABLET ORAL DAILY
COMMUNITY
Start: 2018-10-19 | End: 2018-11-30

## 2018-11-30 RX ORDER — HYDROCODONE BITARTRATE AND ACETAMINOPHEN 5; 325 MG/1; MG/1
2-3 TABLET ORAL DAILY
Qty: 10 TABLET | Status: CANCELLED | OUTPATIENT
Start: 2018-11-30

## 2018-11-30 ASSESSMENT — PATIENT HEALTH QUESTIONNAIRE - PHQ9: SUM OF ALL RESPONSES TO PHQ QUESTIONS 1-9: 4

## 2018-11-30 NOTE — TELEPHONE ENCOUNTER
Patient is requesting an rx for nystatin powder. She reports she lost her bottle in transition.  Thank you  Nolvia Wong, [facility]  Phone [phone]  Fax      [phone]

## 2018-11-30 NOTE — PROGRESS NOTES

## 2018-11-30 NOTE — MR AVS SNAPSHOT
After Visit Summary   11/30/2018    Nuria Shelley    MRN: [id number]           Patient Information     Date Of Birth          1940        Visit Information        Provider Department      11/30/2018 2:40 PM Betty Ham MD [facility]        Today's Diagnoses     Major depressive disorder, recurrent episode, mild (H)    -  1    DDD (degenerative disc disease), lumbar        History of total left hip arthroplasty        MRSA infection        Chronic obstructive pulmonary disease, unspecified COPD type (H)        Metastatic breast cancer (H)        Need for prophylactic vaccination and inoculation against influenza          Care Instructions    [facility]    If you have any questions regarding to your visit please contact your care team:       Team Red:   Clinic Hours Telephone Number   Dr. Amarilys Lozano, NP 7am-7pm  Monday - Thursday   7am-5pm  Fridays  [phone]  (Appointment scheduling available 24/7)   Urgent Care - [place] - 11am-9pm Monday-Friday Saturday-Sunday- 9am-5pm   [place] - 5pm-9pm Monday-Friday Saturday-Sunday- 9am-5pm  [phone] - [place]  [phone] - [place]       What options do I have for a visit other than an office visit? We offer electronic visits (e-visits) and telephone visits, when medically appropriate.  Please check with your medical insurance to see if these types of visits are covered, as you will be responsible for any charges that are not paid by your insurance.      You can use Topera (secure electronic communication) to access to your chart, send your primary care provider a message, or make an appointment. Ask a team member how to get started.     For a price quote for your services, please call our Consumer Price Line at [phone] or our Imaging Cost estimation line at [phone] (for imaging tests).              Follow-ups  "after your visit        Follow-up notes from your care team     Return in about 3 months (around 2/28/2019) for Med check.      Who to contact     If you have questions or need follow up information about today's clinic visit or your schedule please contact [facility] directly at [phone].  Normal or non-critical lab and imaging results will be communicated to you by MyChart, letter or phone within 4 business days after the clinic has received the results. If you do not hear from us within 7 days, please contact the clinic through Watticshart or phone. If you have a critical or abnormal lab result, we will notify you by phone as soon as possible.  Submit refill requests through Road Hero or call your pharmacy and they will forward the refill request to us. Please allow 3 business days for your refill to be completed.          Additional Information About Your Visit        MyChart Information     Road Hero gives you secure access to your electronic health record. If you see a primary care provider, you can also send messages to your care team and make appointments. If you have questions, please call your primary care clinic.  If you do not have a primary care provider, please call [phone] and they will assist you.        Care EveryWhere ID     This is your Care EveryWhere ID. This could be used by other organizations to access your [facility] medical records  [id number]        Your Vitals Were     Pulse Temperature Respirations Height Pulse Oximetry Breastfeeding?    110 96.5  F (35.8  C) (Oral) 14 5' 5\" (1.651 m) 95% No    BMI (Body Mass Index)                   28.46 kg/m2            Blood Pressure from Last 3 Encounters:   11/30/18 132/84   07/19/18 136/82   12/17/17 140/73    Weight from Last 3 Encounters:   11/30/18 171 lb (77.6 kg)   07/19/18 183 lb (83 kg)   12/17/17 181 lb 4 oz (82.2 kg)              We Performed the Following     Lipid panel reflex to direct LDL Fasting          Today's " Medication Changes          These changes are accurate as of 11/30/18  3:15 PM.  If you have any questions, ask your nurse or doctor.               Start taking these medicines.        Dose/Directions    diclofenac 75 MG EC tablet   Commonly known as:  VOLTAREN   Used for:  DDD (degenerative disc disease), lumbar   Started by:  Betty Ham MD        Dose:  75 mg   Take 1 tablet (75 mg) by mouth 2 times daily   Quantity:  180 tablet   Refills:  0       PARoxetine 20 MG tablet   Commonly known as:  PAXIL   Used for:  Major depressive disorder, recurrent episode, mild (H)   Started by:  Betty Ham MD        TAKE ONE TABLET BY MOUTH EVERY DAY AT BEDTIME   Quantity:  90 tablet   Refills:  1         These medicines have changed or have updated prescriptions.        Dose/Directions    HYDROcodone-acetaminophen 5-325 MG tablet   Commonly known as:  NORCO   This may have changed:  how much to take   Used for:  History of total left hip arthroplasty   Changed by:  Betty Ham MD        Dose:  1 tablet   Take 1 tablet by mouth daily   Quantity:  25 tablet   Refills:  0            Where to get your medicines      These medications were sent to [facility] [place] - [address] [place] [address]  [address], [facility] [address]     Phone:  [phone]     diclofenac 75 MG EC tablet    PARoxetine 20 MG tablet         Some of these will need a paper prescription and others can be bought over the counter.  Ask your nurse if you have questions.     Bring a paper prescription for each of these medications     HYDROcodone-acetaminophen 5-325 MG tablet               Information about OPIOIDS     PRESCRIPTION OPIOIDS: WHAT YOU NEED TO KNOW   We gave you an opioid (narcotic) pain medicine. It is important to manage your pain, but opioids are not always the best choice. You should first try all the other options your care team gave you. Take this medicine for as short a time (and as few doses) as  possible.    Some activities can increase your pain, such as bandage changes or therapy sessions. It may help to take your pain medicine 30 to 60 minutes before these activities. Reduce your stress by getting enough sleep, working on hobbies you enjoy and practicing relaxation or meditation. Talk to your care team about ways to manage your pain beyond prescription opioids.    These medicines have risks:    DO NOT drive when on new or higher doses of pain medicine. These medicines can affect your alertness and reaction times, and you could be arrested for driving under the influence (DUI). If you need to use opioids long-term, talk to your care team about driving.    DO NOT operate heavy machinery    DO NOT do any other dangerous activities while taking these medicines.    DO NOT drink any alcohol while taking these medicines.     If the opioid prescribed includes acetaminophen, DO NOT take with any other medicines that contain acetaminophen. Read all labels carefully. Look for the word  acetaminophen  or  Tylenol.  Ask your pharmacist if you have questions or are unsure.    You can get addicted to pain medicines, especially if you have a history of addiction (chemical, alcohol or substance dependence). Talk to your care team about ways to reduce this risk.    All opioids tend to cause constipation. Drink plenty of water and eat foods that have a lot of fiber, such as fruits, vegetables, prune juice, apple juice and high-fiber cereal. Take a laxative (Miralax, milk of magnesia, Colace, Senna) if you don t move your bowels at least every other day. Other side effects include upset stomach, sleepiness, dizziness, throwing up, tolerance (needing more of the medicine to have the same effect), physical dependence and slowed breathing.    Store your pills in a secure place, locked if possible. We will not replace any lost or stolen medicine. If you don t finish your medicine, please throw away (dispose) as directed by your  pharmacist. The [place] Pollution Control Agency has more information about safe disposal: https://www.pca.[email or web address].[place].us/living-green/managing-unwanted-medications         Primary Care Provider Office Phone # Fax #    Betty Ham [phone] [address] [facility] [address] [facility]  [facility] [id number]        Equal Access to Services     [facility]: Hadii aad ku hadasho Soomaali, waaxda luqadaha, qaybta kaalmada adeegyada, waxay idiin hayaan adeeg migdaliawinnie laJuansimonricci . So [facility] [phone].    ATENCIÓN: Si habla español, tiene a nye disposición servicios gratuitos de asistencia lingüística. Sheba al [phone].    We comply with applicable federal civil rights laws and [place] laws. We do not discriminate on the basis of race, color, national origin, age, disability, sex, sexual orientation, or gender identity.            Thank you!     Thank you for choosing [facility]  for your care. Our goal is always to provide you with excellent care. Hearing back from our patients is one way we can continue to improve our services. Please take a few minutes to complete the written survey that you may receive in the mail after your visit with us. Thank you!             Your Updated Medication List - Protect others around you: Learn how to safely use, store and throw away your medicines at www.disposemymeds.org.          This list is accurate as of 11/30/18  3:15 PM.  Always use your most recent med list.                   Brand Name Dispense Instructions for use Diagnosis    ADVAIR DISKUS 100-50 MCG/DOSE inhaler   Generic drug:  fluticasone-salmeterol      Inhale 1 puff into the lungs two times daily        anastrozole 1 MG tablet    ARIMIDEX     Take 1 mg by mouth daily        aspirin 325 MG EC tablet    ASA     Take 325 mg by mouth daily        CALCIUM + D PO      Take  by mouth. 1200 mg calcium daily with vitamin D 1000 units daily        diclofenac 75 MG EC tablet    VOLTAREN    180 tablet    Take 1  tablet (75 mg) by mouth 2 times daily    DDD (degenerative disc disease), lumbar       * fluticasone 50 MCG/ACT nasal spray    FLONASE    16 g    USE ONE TO TWO SPRAYS IN EACH NOSTRIL EVERY DAY    Chronic nasal congestion       * fluticasone 50 MCG/ACT nasal spray    FLONASE     Spray 2 sprays in nostril daily        HYDROcodone-acetaminophen 5-325 MG tablet    NORCO    25 tablet    Take 1 tablet by mouth daily    History of total left hip arthroplasty       minocycline 100 MG capsule    MINOCIN/DYNACIN     TK 1 C PO BID        MULTI-VITAMIN PO      Take  by mouth.        nystatin Powd           olopatadine 0.1 % ophthalmic solution    PATANOL    1 mL    Place 1 drop into both eyes 2 times daily.    Screening for depression       PARoxetine 20 MG tablet    PAXIL    90 tablet    TAKE ONE TABLET BY MOUTH EVERY DAY AT BEDTIME    Major depressive disorder, recurrent episode, mild (H)       polyethylene glycol packet    MIRALAX/GLYCOLAX     Take 1 packet by mouth daily        rifampin 300 MG capsule    RIFADIN     TK 1 C PO BID BEFORE MEALS FOR 90 DAYS        senna-docusate 8.6-50 MG tablet    SENOKOT-S/PERICOLACE     Take 1 tablet by mouth two times daily        sertraline 25 MG tablet    ZOLOFT    30 tablet    Take 1 tablet (25 mg) by mouth daily    Major depressive disorder, recurrent episode, mild (H)       simvastatin 20 MG tablet    ZOCOR    90 tablet    TAKE ONE TABLET BY MOUTH EVERY DAY AT BEDTIME    Hyperlipidemia LDL goal <130       SPIRIVA HANDIHALER 18 MCG inhaled capsule   Generic drug:  tiotropium      Inhale contents of one capsule daily.    Chronic obstructive pulmonary disease, unspecified COPD type (H)       VENTOLIN  (90 Base) MCG/ACT inhaler   Generic drug:  albuterol     18 g    INHALE 2 PUFFS INTO THE LUNGS EVERY 6 HOURS AS NEEDED FOR SHORTNESS OF BREATH/DYSPNEA    Chronic obstructive pulmonary disease, unspecified COPD type (H)       Walker Auto Glides Misc      2 Wheeled Walker for home  use. For 6 weeks.        * Notice:  This list has 2 medication(s) that are the same as other medications prescribed for you. Read the directions carefully, and ask your doctor or other care provider to review them with you.

## 2018-11-30 NOTE — PATIENT INSTRUCTIONS
[facility]    If you have any questions regarding to your visit please contact your care team:       Team Red:   Clinic Hours Telephone Number   Dr. Amarilys Lozano, NP 7am-7pm  Monday - Thursday   7am-5pm  Fridays  [phone]  (Appointment scheduling available 24/7)   Urgent Care - [place] and [facility] - 11am-9pm Monday-Friday Saturday-Sunday- 9am-5pm   [place] - 5pm-9pm Monday-Friday Saturday-Sunday- 9am-5pm  [phone] - [place]  [phone] - [place]       What options do I have for a visit other than an office visit? We offer electronic visits (e-visits) and telephone visits, when medically appropriate.  Please check with your medical insurance to see if these types of visits are covered, as you will be responsible for any charges that are not paid by your insurance.      You can use PiperScout (secure electronic communication) to access to your chart, send your primary care provider a message, or make an appointment. Ask a team member how to get started.     For a price quote for your services, please call our Consumer Price Line at [phone] or our Imaging Cost estimation line at [phone] (for imaging tests).

## 2018-11-30 NOTE — NURSING NOTE
"Chief Complaint   Patient presents with     Rehab Follow Up     Rehab center      Health Maintenance     Flu Shot, Lipid, Dexa, Medicare Annual Visit     Initial /84 (BP Location: Left arm, Patient Position: Chair, Cuff Size: Adult Regular)  Pulse 110  Temp 96.5  F (35.8  C) (Oral)  Resp 14  Ht 5' 5\" (1.651 m)  Wt 171 lb (77.6 kg)  SpO2 95%  Breastfeeding? No  BMI 28.46 kg/m2 Estimated body mass index is 28.46 kg/(m^2) as calculated from the following:    Height as of this encounter: 5' 5\" (1.651 m).    Weight as of this encounter: 171 lb (77.6 kg).  BP completed using cuff size: larisa Geronimo  "

## 2018-12-03 DIAGNOSIS — E78.5 HYPERLIPIDEMIA LDL GOAL <130: ICD-10-CM

## 2018-12-03 RX ORDER — NYSTATIN 100000 [USP'U]/G
POWDER TOPICAL 3 TIMES DAILY PRN
Qty: 60 G | Refills: 1 | Status: SHIPPED | OUTPATIENT
Start: 2018-12-03 | End: 2021-09-22

## 2018-12-03 RX ORDER — SIMVASTATIN 20 MG
TABLET ORAL
Qty: 90 TABLET | Refills: 3 | Status: SHIPPED | OUTPATIENT
Start: 2018-12-03 | End: 2019-09-17

## 2018-12-05 ENCOUNTER — MEDICAL CORRESPONDENCE (OUTPATIENT)
Dept: HEALTH INFORMATION MANAGEMENT | Facility: CLINIC | Age: 78
End: 2018-12-05

## 2018-12-05 ENCOUNTER — TELEPHONE (OUTPATIENT)
Dept: FAMILY MEDICINE | Facility: CLINIC | Age: 78
End: 2018-12-05

## 2018-12-05 NOTE — TELEPHONE ENCOUNTER
Reason for Call:  Form, our goal is to have forms completed with 72 hours, however, some forms may require a visit or additional information.    Type of letter, form or note:  medical face to face form for home care    Who is the form from?: Home care    Where did the form come from: form was faxed in    What clinic location was the form placed at?: [facility]    Where the form was placed: Form faxed on 11-14-18 to fax number [phone]    What number is listed as a contact on the form?: [phone]       Additional comments: Checking status of the paper work.    Call taken on 12/5/2018 at 11:00 AM by Ava Rosario

## 2018-12-05 NOTE — TELEPHONE ENCOUNTER
Blank form located and placed at the providers desk.    They did not receive what was completed last week. Charlee Bull,

## 2018-12-07 ENCOUNTER — TELEPHONE (OUTPATIENT)
Dept: FAMILY MEDICINE | Facility: CLINIC | Age: 78
End: 2018-12-07

## 2018-12-07 NOTE — TELEPHONE ENCOUNTER
Reason for Call:  Other Face to face encounter    Detailed comments: [facility] care calling. They did not receive form for face to face encounter. Please re fax to [phone]    Phone Number Patient can be reached at: Other phone number:   [facility] (HOME CARE) [phone]       Best Time: ASAP    Can we leave a detailed message on this number? YES    Call taken on 12/7/2018 at 9:10 AM by Nilda Kaufman

## 2018-12-07 NOTE — TELEPHONE ENCOUNTER
Received a fax back needing the following information for the Face to Face form:      In addition to the Medical Record Physician Signature Attestation Statement needs to be guy and dated by the provider.    Forms put at the providers desk. Charlee Bull,

## 2018-12-12 ENCOUNTER — TELEPHONE (OUTPATIENT)
Dept: FAMILY MEDICINE | Facility: CLINIC | Age: 78
End: 2018-12-12

## 2018-12-12 NOTE — TELEPHONE ENCOUNTER
Reason for call:  Other   Patient called regarding (reason for call): ARMANDO  Additional comments: Home care would like for you to know that patient is refusing for them to come out to her home.     Phone number to reach patient:  Other phone number:  [phone]     Best Time:  any    Can we leave a detailed message on this number?  YES

## 2018-12-12 NOTE — TELEPHONE ENCOUNTER
Called and spoke to patient.   Reports she is not interested in home care at this time due to being too busy with [facility].   States she is too busy and tired for this extra stuff.   Reports maybe after [facility] she'd be interested.     Routing to Provider ARMANDO.     Tatiana Patel RN

## 2019-01-07 ENCOUNTER — MEDICAL CORRESPONDENCE (OUTPATIENT)
Dept: HEALTH INFORMATION MANAGEMENT | Facility: CLINIC | Age: 79
End: 2019-01-07

## 2019-01-22 ENCOUNTER — TRANSFERRED RECORDS (OUTPATIENT)
Dept: HEALTH INFORMATION MANAGEMENT | Facility: CLINIC | Age: 79
End: 2019-01-22

## 2019-03-23 ENCOUNTER — OFFICE VISIT (OUTPATIENT)
Dept: URGENT CARE | Facility: URGENT CARE | Age: 79
End: 2019-03-23
Payer: COMMERCIAL

## 2019-03-23 VITALS
OXYGEN SATURATION: 96 % | BODY MASS INDEX: 27.46 KG/M2 | SYSTOLIC BLOOD PRESSURE: 127 MMHG | DIASTOLIC BLOOD PRESSURE: 64 MMHG | WEIGHT: 165 LBS | TEMPERATURE: 98 F | HEART RATE: 100 BPM

## 2019-03-23 DIAGNOSIS — J01.90 ACUTE SINUSITIS WITH SYMPTOMS > 10 DAYS: Primary | ICD-10-CM

## 2019-03-23 PROCEDURE — 99213 OFFICE O/P EST LOW 20 MIN: CPT | Performed by: NURSE PRACTITIONER

## 2019-03-23 RX ORDER — CEPHALEXIN 500 MG/1
500 CAPSULE ORAL 2 TIMES DAILY
Qty: 20 CAPSULE | Refills: 0 | Status: SHIPPED | OUTPATIENT
Start: 2019-03-23 | End: 2019-08-28

## 2019-03-23 NOTE — PROGRESS NOTES
SUBJECTIVE:  Nuria Shelley is a 79 year old female here with concerns about sinus infection.  She states onset of symptoms were 3 week(s) ago.  She has had frontal pressure. Course of illness is worsening. Severity moderate  Current and Associated symptoms: nasal congestion, rhinorrhea, facial pain/pressure and headache  Predisposing factors include none. Recent treatment has included: OTC meds    Past Medical History:   Diagnosis Date     Allergic rhinitis      Back strain      Bone metastases (H)      Breast cancer (H)     Right     Constipation      COPD (chronic obstructive pulmonary disease) (H)      Depression      Diverticulosis 2013     GERD (gastroesophageal reflux disease)      IBS (irritable bowel syndrome)      Malignant pleural effusion      Obesity      Urinary incontinence 2010     Urinary stress incontinence      Wrist tendonitis     Left     Social History     Tobacco Use     Smoking status: Former Smoker     Years: 20.00     Types: Cigarettes     Last attempt to quit: 1980     Years since quittin.2     Smokeless tobacco: Never Used   Substance Use Topics     Alcohol use: Yes     Comment: ocass       ROS:  CONSTITUTIONAL:NEGATIVE for fever, chills, change in weight  INTEGUMENTARY/SKIN: NEGATIVE for worrisome rashes, moles or lesions  EYES: NEGATIVE for vision changes or irritation  ENT/MOUTH: POSITIVE for postnasal drainage and rhinorrhea-purulent  RESP:NEGATIVE for significant cough or SOB  CV: NEGATIVE for chest pain, palpitations or peripheral edema  GI: NEGATIVE for nausea, abdominal pain, heartburn, or change in bowel habits      OBJECTIVE:  /64   Pulse 100   Temp 98  F (36.7  C) (Tympanic)   SpO2 96%   Exam:GENERAL APPEARANCE: alert and no distress  EYES: EOMI,  PERRL, conjunctiva clear  HENT: TM's normal bilaterally, oral mucous membranes moist, no erythema noted and frontal sinus tenderness   NECK: supple, nontender, no lymphadenopathy  RESP: lungs clear  to auscultation - no rales, rhonchi or wheezes  CV: regular rates and rhythm, normal S1 S2, no murmur noted  NEURO: Normal strength and tone, sensory exam grossly normal,  normal speech and mentation  SKIN: no suspicious lesions or rashes    ASSESSMENT:  Sinusitis      PLAN:  Cephalexin BID X 10 days  Follow up with primary clinic if not improving    ION Adhikari CNP

## 2019-04-16 ENCOUNTER — TRANSFERRED RECORDS (OUTPATIENT)
Dept: HEALTH INFORMATION MANAGEMENT | Facility: CLINIC | Age: 79
End: 2019-04-16

## 2019-04-16 DIAGNOSIS — R09.81 CHRONIC NASAL CONGESTION: ICD-10-CM

## 2019-04-17 RX ORDER — FLUTICASONE PROPIONATE 50 MCG
SPRAY, SUSPENSION (ML) NASAL
Qty: 16 G | Refills: 11 | Status: SHIPPED | OUTPATIENT
Start: 2019-04-17 | End: 2020-05-12

## 2019-04-17 NOTE — TELEPHONE ENCOUNTER
"Routing refill request to provider for review/approval because:  Medication is reported/historical    Requested Prescriptions   Pending Prescriptions Disp Refills     fluticasone (FLONASE) 50 MCG/ACT nasal spray 16 g 11     Sig: USE ONE TO TWO SPRAYS IN EACH NOSTRIL EVERY DAY       Inhaled Steroids Protocol Passed - 4/17/2019  7:51 AM        Passed - Patient is age 12 or older        Passed - Recent (12 mo) or future (30 days) visit within the authorizing provider's specialty     Patient had office visit in the last 12 months or has a visit in the next 30 days with authorizing provider or within the authorizing provider's specialty.  See \"Patient Info\" tab in inbasket, or \"Choose Columns\" in Meds & Orders section of the refill encounter.              Passed - Medication is active on med list        Renetta Abdi RN - BC      "

## 2019-05-16 ENCOUNTER — TRANSFERRED RECORDS (OUTPATIENT)
Dept: HEALTH INFORMATION MANAGEMENT | Facility: CLINIC | Age: 79
End: 2019-05-16

## 2019-06-06 DIAGNOSIS — F33.0 MAJOR DEPRESSIVE DISORDER, RECURRENT EPISODE, MILD (H): ICD-10-CM

## 2019-06-07 RX ORDER — PAROXETINE 20 MG/1
TABLET, FILM COATED ORAL
Qty: 30 TABLET | Refills: 0 | Status: SHIPPED | OUTPATIENT
Start: 2019-06-07 | End: 2019-08-28

## 2019-06-10 ENCOUNTER — TELEPHONE (OUTPATIENT)
Dept: FAMILY MEDICINE | Facility: CLINIC | Age: 79
End: 2019-06-10

## 2019-06-10 NOTE — TELEPHONE ENCOUNTER
Reason for Call:  Other call back    Detailed comments:  Yuko calling.      1) new case management, pain.     Phone Number Patient can be reached at: Other phone number:  [phone]    Best Time:  Any     Can we leave a detailed message on this number? YES    Call taken on 6/10/2019 at 12:57 PM by Nati Trinidad

## 2019-06-10 NOTE — TELEPHONE ENCOUNTER
Spoke with Yuko, Nurse  with pt's insurance plan. Just calling to update care team. She connected with pt for case management. Pt has been using out of network benefits. Pt is having ongoing pain and thinks this is being managed by her oncologist. Will try to do a visit with pt on how to connect pt with resources, but this is not scheduled yet. PHQ2 score is 1. Will find out from [place] clinic if she is utilizing the cancer care resources available to her. If a specific need, please call her or she will update in a few weeks with any concerns.     Amanda Padilla RN  [facility]

## 2019-06-26 ENCOUNTER — TELEPHONE (OUTPATIENT)
Dept: FAMILY MEDICINE | Facility: CLINIC | Age: 79
End: 2019-06-26

## 2019-06-26 NOTE — TELEPHONE ENCOUNTER
Reason for Call:  Other call back    Detailed comments: Yuko patient  wants to update Dr. Ham on information on patient per discussion they had today. Yuko wouldn't provide details    Phone Number Patient can be reached at: Other phone number:  [phone]    Best Time: any    Can we leave a detailed message on this number? YES    Call taken on 6/26/2019 at 4:25 PM by Ridge De La O

## 2019-06-26 NOTE — TELEPHONE ENCOUNTER
Called and spoke with Yuko, patient's [facility] Nurse .  Reports that she spoke with patient today and her health and wellbeing.   Reports that patient continues to have some struggles but states they aren't new but wanted PCP to be aware.   Reports she continues to have bouts of dizziness but they aren't getting any worse and in the AM she states her walking is clumsy but does use a cane and denies any falls in the home.  Reports she is going to visit patient at her home on Friday and evaluate her and see if she can connect her with some resources or more support so patient is able to stay in her home longer.   Reports she is going to check patient's medications that she is taking, write them down and then call next week to do a med rec against our medication list for patient.     Tatiana Patel RN

## 2019-07-12 ENCOUNTER — TELEPHONE (OUTPATIENT)
Dept: FAMILY MEDICINE | Facility: CLINIC | Age: 79
End: 2019-07-12

## 2019-07-12 NOTE — TELEPHONE ENCOUNTER
Reason for Call:  Other call back    Detailed comments:  Yuko calling. She has a phq9 score. 8    Phone Number Patient can be reached at: Other phone number:  [phone]    Best Time:  Any     Can we leave a detailed message on this number? YES    Call taken on 7/12/2019 at 12:22 PM by Nati Trinidad

## 2019-07-15 NOTE — TELEPHONE ENCOUNTER
"Called and spoke with Yuko ([facility] partners case manage).    Yuko call last week to update Dr. Ham that Nuria has a PHQ-9 score of 8(last score of 1). Patient denies and thoughts of self harm. Yuko has an appointment to meet with Nuria on 07/17/19. \"To discuss option that are available to help Nuria with the many pieces of her care\".     Yuko will call back with update on Thursday 07/18/19.     Savita Cedeno MA    "

## 2019-07-23 ENCOUNTER — TRANSFERRED RECORDS (OUTPATIENT)
Dept: HEALTH INFORMATION MANAGEMENT | Facility: CLINIC | Age: 79
End: 2019-07-23

## 2019-07-26 ENCOUNTER — ANCILLARY PROCEDURE (OUTPATIENT)
Dept: GENERAL RADIOLOGY | Facility: CLINIC | Age: 79
End: 2019-07-26
Attending: NURSE PRACTITIONER
Payer: COMMERCIAL

## 2019-07-26 ENCOUNTER — OFFICE VISIT (OUTPATIENT)
Dept: URGENT CARE | Facility: URGENT CARE | Age: 79
End: 2019-07-26
Payer: COMMERCIAL

## 2019-07-26 ENCOUNTER — TELEPHONE (OUTPATIENT)
Dept: FAMILY MEDICINE | Facility: CLINIC | Age: 79
End: 2019-07-26

## 2019-07-26 VITALS
DIASTOLIC BLOOD PRESSURE: 79 MMHG | HEART RATE: 97 BPM | TEMPERATURE: 97.4 F | OXYGEN SATURATION: 91 % | SYSTOLIC BLOOD PRESSURE: 126 MMHG | RESPIRATION RATE: 18 BRPM | WEIGHT: 168 LBS | BODY MASS INDEX: 27.96 KG/M2

## 2019-07-26 DIAGNOSIS — S79.912A INJURY OF LEFT HIP, INITIAL ENCOUNTER: Primary | ICD-10-CM

## 2019-07-26 DIAGNOSIS — S72.002A CLOSED FRACTURE OF LEFT HIP, INITIAL ENCOUNTER (H): ICD-10-CM

## 2019-07-26 PROCEDURE — 73502 X-RAY EXAM HIP UNI 2-3 VIEWS: CPT

## 2019-07-26 PROCEDURE — 99214 OFFICE O/P EST MOD 30 MIN: CPT | Performed by: NURSE PRACTITIONER

## 2019-07-26 ASSESSMENT — ENCOUNTER SYMPTOMS
NAUSEA: 0
VOMITING: 0
SHORTNESS OF BREATH: 0
FEVER: 0
CHILLS: 0
DIARRHEA: 0
COUGH: 0
HEADACHES: 0
SORE THROAT: 0
RHINORRHEA: 0

## 2019-07-26 NOTE — PROGRESS NOTES
SUBJECTIVE:   Nuria Shelley is a 79 year old female presenting with a chief complaint of   Chief Complaint   Patient presents with     Urgent Care     Fall     Happened two weeks ago fell backwards pain on left side hip radiates to her feet sharp, stabbing, ache, and cramping. Pt has been taking  oxycodone, and tylenol/ibuprofen.        She is an established patient of [facility].    Left hip Injury/Pain    Onset of symptoms was 2 week(s) ago.  Location: left hip  Context:       The injury happened while at HOME      Mechanism:Samantha fell backwards while trying to let out the dog.      Patient experienced immediate pain, inability to bear weight directly after injury, pain is sharp and radiates down the left thigh.   Course of symptoms is worsening.    Severity moderate  Current and Associated symptoms: Pain, Swelling and Decreased range of motion  Denies  Warmth and Redness  Aggravating Factors: walking, movement and flexion/extension  Therapies to improve symptoms include: oxycodone  This is not the first time this type of problem has occurred for this patient.   Had hip replacement on carl left hip 1 year ago    Review of Systems   Constitutional: Negative for chills and fever.   HENT: Negative for congestion, ear pain, rhinorrhea and sore throat.    Respiratory: Negative for cough and shortness of breath.    Gastrointestinal: Negative for diarrhea, nausea and vomiting.   Skin:        Left hip injury   Neurological: Negative for headaches.   All other systems reviewed and are negative.      Past Medical History:   Diagnosis Date     Allergic rhinitis      Back strain      Bone metastases (H)      Breast cancer (H) 2008    Right     Constipation      COPD (chronic obstructive pulmonary disease) (H)      Depression      Diverticulosis 5/2/2013     GERD (gastroesophageal reflux disease)      IBS (irritable bowel syndrome)      Malignant pleural effusion      Obesity      Urinary incontinence 11/16/2010      Urinary stress incontinence      Wrist tendonitis     Left     Family History   Problem Relation Age of Onset     Heart Disease Mother      Diabetes Mother         d 70 from CAD     Cerebrovascular Disease Father         d age 82     Heart Disease Maternal Grandmother      Diabetes Brother      Heart Disease Brother         MI age 60     Heart Disease Sister         d age 52 from MI     Diabetes Sister      Breast Cancer Daughter         age 50     Heart Disease Brother      Diabetes Brother      Alzheimer Disease Brother      Cancer Brother         pancreatic ca     Anesthesia Reaction No family hx of      Current Outpatient Medications   Medication Sig Dispense Refill     anastrozole (ARIMIDEX) 1 MG tablet Take 1 mg by mouth daily       aspirin 325 MG EC tablet Take 325 mg by mouth daily       Calcium-Vitamin D (CALCIUM + D PO) Take  by mouth. 1200 mg calcium daily with vitamin D 1000 units daily       diclofenac (VOLTAREN) 75 MG EC tablet Take 1 tablet (75 mg) by mouth 2 times daily 180 tablet 0     fluticasone (FLONASE) 50 MCG/ACT nasal spray USE ONE TO TWO SPRAYS IN EACH NOSTRIL EVERY DAY 16 g 11     fluticasone (FLONASE) 50 MCG/ACT spray Spray 2 sprays in nostril daily       fluticasone-salmeterol (ADVAIR DISKUS) 100-50 MCG/DOSE diskus inhaler Inhale 1 puff into the lungs two times daily       HYDROcodone-acetaminophen (NORCO) 5-325 MG tablet Take 1 tablet by mouth daily 25 tablet 0     minocycline (MINOCIN/DYNACIN) 100 MG capsule TK 1 C PO BID  0     Misc. Devices (WALKER AUTO GLIDES) MISC 2 Wheeled Walker for home use. For 6 weeks.       Multiple Vitamin (MULTI-VITAMIN PO) Take  by mouth.       nystatin (MYCOSTATIN) 215832 UNIT/GM external powder Apply topically 3 times daily as needed 60 g 1     nystatin POWD        olopatadine (PATANOL) 0.1 % ophthalmic solution Place 1 drop into both eyes 2 times daily. 1 mL 3     PARoxetine (PAXIL) 20 MG tablet TAKE ONE TABLET BY MOUTH ONCE DAILY AT BEDTIME Need to see  MD for further refills 30 tablet 0     polyethylene glycol (MIRALAX/GLYCOLAX) Packet Take 1 packet by mouth daily       rifampin (RIFADIN) 300 MG capsule TK 1 C PO BID BEFORE MEALS FOR 90 DAYS  0     senna-docusate (SENOKOT-S;PERICOLACE) 8.6-50 MG per tablet Take 1 tablet by mouth two times daily       sertraline (ZOLOFT) 25 MG tablet Take 1 tablet (25 mg) by mouth daily 30 tablet 0     simvastatin (ZOCOR) 20 MG tablet TAKE ONE TABLET BY MOUTH EVERY DAY AT BEDTIME 90 tablet 3     tiotropium (SPIRIVA HANDIHALER) 18 MCG capsule Inhale contents of one capsule daily.       VENTOLIN  (90 BASE) MCG/ACT Inhaler INHALE 2 PUFFS INTO THE LUNGS EVERY 6 HOURS AS NEEDED FOR SHORTNESS OF BREATH/DYSPNEA 18 g 12     Social History     Tobacco Use     Smoking status: Former Smoker     Years: 20.00     Types: Cigarettes     Last attempt to quit: 1980     Years since quittin.5     Smokeless tobacco: Never Used   Substance Use Topics     Alcohol use: Yes     Comment: ocass       OBJECTIVE  /79   Pulse 97   Temp 97.4  F (36.3  C) (Oral)   Resp 18   Wt 76.2 kg (168 lb)   SpO2 91%   BMI 27.96 kg/m      Physical Exam   Skin:   Tender left hip exacerbated by movement, no swelling or bruising note, There is reduced ROM. Pulses and sensation on extremity intact.     Results for orders placed or performed in visit on 19   XR Hip Left 2-3 Views    Narrative    HIP LEFT TWO TO THREE VIEWS   2019 6:53 PM     HISTORY:  Fell two weeks ago, pain is getting worse since then on left  hip. Injury of left hip, initial encounter.    COMPARISON: None.    FINDINGS: There is a comminuted fracture of the greater trochanter. A  large 38 x 48 x 49 mm greater trochanter fracture fragment is  displaced 53 mm superiorly and laterally. Since there are no prior  studies for comparison, this is age-indeterminate. Bipolar left hip  hemiarthroplasty without evidence of hardware loosening. Otherwise  negative.    CHANA JOSE,  MD       Labs:  Results for orders placed or performed in visit on 07/26/19 (from the past 24 hour(s))   XR Hip Left 2-3 Views    Narrative    HIP LEFT TWO TO THREE VIEWS   7/26/2019 6:53 PM     HISTORY:  Fell two weeks ago, pain is getting worse since then on left  hip. Injury of left hip, initial encounter.    COMPARISON: None.    FINDINGS: There is a comminuted fracture of the greater trochanter. A  large 38 x 48 x 49 mm greater trochanter fracture fragment is  displaced 53 mm superiorly and laterally. Since there are no prior  studies for comparison, this is age-indeterminate. Bipolar left hip  hemiarthroplasty without evidence of hardware loosening. Otherwise  negative.    CHANA JOSE MD         ASSESSMENT:      ICD-10-CM    1. Injury of left hip, initial encounter S79.912A XR Hip Left 2-3 Views   2. Closed fracture of left hip, initial encounter (H) S72.002A             PLAN:  Unable to determine if it's a new fracture or not. With significant pain, a  decision is made to send patient to ER for evaluation. This has been discussed with patient.  And patient is in agreement with treatment plan  A suggestion to use Ambulance is advised, patient has declined, the grand daughter will drive patient to the [facility] ER.       Patient Instructions     Patient Education     Understanding Hip Fractures  The hip is the largest weight-bearing joint in the body. It s also a common place for a fracture after a fall--especially in older people. Hip fractures are even more likely in people with osteoporosis a disease that leads to weakened bones.    A fractured hip  The hip can fracture in many places. Most often, the fracture occurs in the upper part of the femur. In rare cases, you can also have more than 1 type of fracture at a time:    Transcervical fracture. A break across the neck of the femur, just under the ball. This type of fracture can interrupt blood flow to the joint.    Intertrochanteric fracture. A  break down through the top of the femur.    Subtrochanteric fracture. A break across the upper shaft of the femur.  A healthy hip  The hip is a ball-and-socket joint where the thighbone (femur) joins the pelvis. When the hip is healthy, you can walk, turn, and move without pain. The head or  ball  of the femur fits into a socket in the pelvis. The ball and socket are each covered with smooth cartilage. This allows the ball to glide easily in the socket. Blood vessels supply oxygen and nutrients to keep the hip joint healthy.    Date Last Reviewed: 4/1/2018 2000-2018 [employer]. [address] [place] [address], [place] [address]. All rights reserved. This information is not intended as a substitute for professional medical care. Always follow your healthcare professional's instructions.

## 2019-07-26 NOTE — TELEPHONE ENCOUNTER
Reason for call:  Other   Patient called regarding (reason for call): call back  Additional comments: Yuko from [facility] calling to inform that Nuria is having worsening pain in her hip, and needs an appointment to see her doctor Please call patient back to discuss.      Phone number to reach patient:  Home number on file [phone] (home)    Best Time:  any    Can we leave a detailed message on this number?  YES

## 2019-07-26 NOTE — TELEPHONE ENCOUNTER
Per Care Everywhere, patient also called Dr. Buckley's office with [facility] and was triaged by their nurse  It was decided that patient would go to [place] urgent care this evening for her pain    Called patient  She stated that she has been having ongoing left leg and back pain (thinks it is from an old fall or sciatica)  Pain is worsening and she will go to [facility] this evening when her daughter is available to take her    Yuko with [facility] updated  She will contact patient later this afternoon to reinforce what Isabella had recommended    Mary Anne Galarza RN

## 2019-07-27 NOTE — PATIENT INSTRUCTIONS
Patient Education     Understanding Hip Fractures  The hip is the largest weight-bearing joint in the body. It s also a common place for a fracture after a fall--especially in older people. Hip fractures are even more likely in people with osteoporosis a disease that leads to weakened bones.    A fractured hip  The hip can fracture in many places. Most often, the fracture occurs in the upper part of the femur. In rare cases, you can also have more than 1 type of fracture at a time:    Transcervical fracture. A break across the neck of the femur, just under the ball. This type of fracture can interrupt blood flow to the joint.    Intertrochanteric fracture. A break down through the top of the femur.    Subtrochanteric fracture. A break across the upper shaft of the femur.  A healthy hip  The hip is a ball-and-socket joint where the thighbone (femur) joins the pelvis. When the hip is healthy, you can walk, turn, and move without pain. The head or  ball  of the femur fits into a socket in the pelvis. The ball and socket are each covered with smooth cartilage. This allows the ball to glide easily in the socket. Blood vessels supply oxygen and nutrients to keep the hip joint healthy.    Date Last Reviewed: 4/1/2018 2000-2018 [employer]. [address] [place] [address], [place] [address]. All rights reserved. This information is not intended as a substitute for professional medical care. Always follow your healthcare professional's instructions.

## 2019-08-23 ENCOUNTER — NURSE TRIAGE (OUTPATIENT)
Dept: FAMILY MEDICINE | Facility: CLINIC | Age: 79
End: 2019-08-23

## 2019-08-23 NOTE — TELEPHONE ENCOUNTER
Reason for call:  Patient reporting a symptom    Symptom or request: dizziness, hot/cold, sharp pain in head, low grade fever  - thinks it's an allergy attack.     Duration (how long have symptoms been present): 08/16/2019    Have you been treated for this before? Na     Additional comments: Patient calling with high importance that she the symptoms she is feeling above is from an allergy attack. And wants to be seen to get in right away or have antibiotics be sent for her. She's afraid she is going to fall again. Please call to advise.     Phone Number patient can be reached at:  Home number on file [phone] (home)    Best Time:  Any     Can we leave a detailed message on this number:  YES    Call taken on 8/23/2019 at 12:11 PM by Brigitte Donnelly

## 2019-08-23 NOTE — TELEPHONE ENCOUNTER
Spoke with patient. Reports she started getting sick last Friday and still isn't feeling better. Reports she's having some dizziness, SOB and low grade fever. Reports her temperature is 98.9 but it normally is 97.7.  Reports she is also being treated at [facility] for her dizziness and has an appointment on Tuesday. Reports she took albuterol inhaler to see if that would help with some SOB.   Patient reports she was in her kitchen unloading the  and she had to hang onto the counters because she got so dizzy.   Advised patient she should go to ED or UC for SOB and dizziness. Gave UC information. Patient states she will see how things go and then go to UC. States she drives herself and lives alone. Advised patient to make sure she if seen today and to call back if symptoms worsen. Pt agreed.     Additional Information    Negative: Shock suspected (e.g., cold/pale/clammy skin, too weak to stand, low BP, rapid pulse)    Negative: Difficult to awaken or acting confused (e.g., disoriented, slurred speech)    Negative: Fainted, and still feels dizzy afterwards    Negative: Severe difficulty breathing (e.g., struggling for each breath, speaks in single words)    Negative: Overdose (accidental or intentional) of medications    Negative: New neurologic deficit that is present now: * Weakness of the face, arm, or leg on one side of the body * Numbness of the face, arm, or leg on one side of the body * Loss of speech or garbled speech    Negative: Heart beating < 50 beats per minute OR > 140 beats per minute    Negative: Sounds like a life-threatening emergency to the triager    Negative: Chest pain    Negative: Rectal bleeding, bloody stool, or tarry-black stool    Negative: Vomiting is the main symptom    Negative: Diarrhea is the main symptom    Negative: Headache is the main symptom    Negative: Heat exhaustion suspected (i.e., dehydration from heat exposure)    Negative: Patient states that he/she is having an  anxiety/panic attack    Negative: Drinking very little and has signs of dehydration (e.g., no urine > 12 hours, very dry mouth, very lightheaded)    Negative: Follows bleeding (e.g., stomach, rectum, vagina) (Exception: became dizzy from sight of small amount blood)    Negative: Patient sounds very sick or weak to the triager    Negative: Extra heart beats OR irregular heart beating (i.e., 'palpitations')    Negative: Severe headache    SEVERE dizziness (e.g., unable to stand, requires support to walk, feels like passing out now)    Negative: Difficulty breathing    Protocols used: DIZZINESS-A-[place]    Tatiana Palma RN

## 2019-08-28 ENCOUNTER — OFFICE VISIT (OUTPATIENT)
Dept: FAMILY MEDICINE | Facility: CLINIC | Age: 79
End: 2019-08-28
Payer: COMMERCIAL

## 2019-08-28 ENCOUNTER — TELEPHONE (OUTPATIENT)
Dept: FAMILY MEDICINE | Facility: CLINIC | Age: 79
End: 2019-08-28

## 2019-08-28 VITALS
BODY MASS INDEX: 27.66 KG/M2 | DIASTOLIC BLOOD PRESSURE: 64 MMHG | RESPIRATION RATE: 18 BRPM | HEIGHT: 65 IN | HEART RATE: 97 BPM | SYSTOLIC BLOOD PRESSURE: 126 MMHG | WEIGHT: 166 LBS | OXYGEN SATURATION: 95 % | TEMPERATURE: 93 F

## 2019-08-28 DIAGNOSIS — T84.52XD INFECTION AND INFLAMMATORY REACTION DUE TO INTERNAL LEFT HIP PROSTHESIS, SUBSEQUENT ENCOUNTER: ICD-10-CM

## 2019-08-28 DIAGNOSIS — J06.9 UPPER RESPIRATORY TRACT INFECTION, UNSPECIFIED TYPE: ICD-10-CM

## 2019-08-28 DIAGNOSIS — F33.0 MAJOR DEPRESSIVE DISORDER, RECURRENT EPISODE, MILD (H): ICD-10-CM

## 2019-08-28 DIAGNOSIS — J44.9 CHRONIC OBSTRUCTIVE PULMONARY DISEASE, UNSPECIFIED COPD TYPE (H): ICD-10-CM

## 2019-08-28 DIAGNOSIS — M25.552 LEFT HIP PAIN: ICD-10-CM

## 2019-08-28 DIAGNOSIS — C50.919 METASTATIC BREAST CANCER: ICD-10-CM

## 2019-08-28 DIAGNOSIS — J32.9 SINUSITIS: Primary | ICD-10-CM

## 2019-08-28 PROCEDURE — 99214 OFFICE O/P EST MOD 30 MIN: CPT | Performed by: FAMILY MEDICINE

## 2019-08-28 RX ORDER — PAROXETINE 20 MG/1
TABLET, FILM COATED ORAL
Qty: 30 TABLET | Refills: 0 | Status: CANCELLED | OUTPATIENT
Start: 2019-08-28

## 2019-08-28 RX ORDER — ESCITALOPRAM OXALATE 10 MG/1
10 TABLET ORAL DAILY
Qty: 30 TABLET | Refills: 0 | Status: SHIPPED | OUTPATIENT
Start: 2019-08-28 | End: 2019-09-17

## 2019-08-28 ASSESSMENT — ANXIETY QUESTIONNAIRES
5. BEING SO RESTLESS THAT IT IS HARD TO SIT STILL: NOT AT ALL
GAD7 TOTAL SCORE: 8
3. WORRYING TOO MUCH ABOUT DIFFERENT THINGS: SEVERAL DAYS
1. FEELING NERVOUS, ANXIOUS, OR ON EDGE: MORE THAN HALF THE DAYS
7. FEELING AFRAID AS IF SOMETHING AWFUL MIGHT HAPPEN: SEVERAL DAYS
2. NOT BEING ABLE TO STOP OR CONTROL WORRYING: MORE THAN HALF THE DAYS
6. BECOMING EASILY ANNOYED OR IRRITABLE: SEVERAL DAYS
IF YOU CHECKED OFF ANY PROBLEMS ON THIS QUESTIONNAIRE, HOW DIFFICULT HAVE THESE PROBLEMS MADE IT FOR YOU TO DO YOUR WORK, TAKE CARE OF THINGS AT HOME, OR GET ALONG WITH OTHER PEOPLE: SOMEWHAT DIFFICULT

## 2019-08-28 ASSESSMENT — MIFFLIN-ST. JEOR: SCORE: 1218.23

## 2019-08-28 ASSESSMENT — PATIENT HEALTH QUESTIONNAIRE - PHQ9
5. POOR APPETITE OR OVEREATING: SEVERAL DAYS
SUM OF ALL RESPONSES TO PHQ QUESTIONS 1-9: 17

## 2019-08-28 NOTE — PROGRESS NOTES
Subjective     Nuria Shelley is a 79 year old female who presents to clinic today for the following health issues:    HPI   Depression and Anxiety Follow-Up    How are you doing with your depression since your last visit? No change    How are you doing with your anxiety since your last visit?  Worsened  As she has Not been taking any medicines    Are you having other symptoms that might be associated with depression or anxiety? No    Have you had a significant life event? Health Concerns     Do you have any concerns with your use of alcohol or other drugs? 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 About doing a Arthrogram    She is Thinking about this    COPD is stable     She is not taking her controller    She does fine with albuterol    No hospitalizations for copd    Social History     Tobacco Use     Smoking status: Former Smoker     Years: 20.00     Types: Cigarettes     Last attempt to quit: 1980     Years since quittin.6     Smokeless tobacco: Never Used   Substance Use Topics     Alcohol use: Yes     Comment: ocass     Drug use: No     PHQ 10/6/2017 2018 2018   PHQ-9 Total Score 5 5 4   Q9: Thoughts of better off dead/self-harm past 2 weeks Not at all Not at all Not at all     ISH-7 SCORE 2015 10/6/2017   Total Score 3 4       In the past two weeks have you had thoughts of suicide or self-harm?  No.    Do you have concerns about your personal safety or the safety of others?   No      RESPIRATORY SYMPTOMS      Duration: 10-12 days    Description  nasal congestion, sore throat, facial pain/pressure, fever, chills, ear pain right and fatigue/malaise  No sob  No wheezing    Severity: severe    Accompanying signs and symptoms: None    History (predisposing factors):  none    Precipitating or alleviating  factors: None    Therapies tried and outcome:  rest and fluids nasal spray/wash - fluticasone           Patient Active Problem List   Diagnosis     IBS (irritable bowel syndrome)     GERD (gastroesophageal reflux disease)     Urinary incontinence     Major depressive disorder, recurrent episode, mild (H)     DUPUYTREN'S CONTRACTURE - right     Hyperlipidemia LDL goal <130     Advanced directives, counseling/discussion     Diverticulosis     COPD (chronic obstructive pulmonary disease) (H)     Chronic nasal congestion     Malignant neoplasm of right female breast, unspecified site of breast     Bone metastasis (H)     Fracture of hip, left, closed, initial encounter (H)     Metastatic breast cancer (H)     Past Surgical History:   Procedure Laterality Date     BREAST LUMPECTOMY, RT/LT      Breast Lumpectomy RT/     BUNIONECTOMY RT/LT       C  DELIVERY ONLY       HC EXCISION BREAST LESION, OPEN >=1      bilateral     HYSTERECTOMY, CERVIX STATUS UNKNOWN      partial     SURGICAL HISTORY OF -       Left hand surgery     SURGICAL HISTORY OF -   2005    Left knee surgery       Social History     Tobacco Use     Smoking status: Former Smoker     Years: 20.00     Types: Cigarettes     Last attempt to quit: 1980     Years since quittin.6     Smokeless tobacco: Never Used   Substance Use Topics     Alcohol use: Yes     Comment: ocass     Family History   Problem Relation Age of Onset     Heart Disease Mother      Diabetes Mother         d 70 from CAD     Cerebrovascular Disease Father         d age 82     Heart Disease Maternal Grandmother      Diabetes Brother      Heart Disease Brother         MI age 60     Heart Disease Sister         d age 52 from MI     Diabetes Sister      Breast Cancer Daughter         age 50     Heart Disease Brother      Diabetes Brother      Alzheimer Disease Brother      Cancer Brother         pancreatic ca     Anesthesia Reaction No family hx of          Current  Outpatient Medications   Medication Sig Dispense Refill     anastrozole (ARIMIDEX) 1 MG tablet Take 1 mg by mouth daily       aspirin 325 MG EC tablet Take 325 mg by mouth daily       Calcium-Vitamin D (CALCIUM + D PO) Take  by mouth. 1200 mg calcium daily with vitamin D 1000 units daily       diclofenac (VOLTAREN) 75 MG EC tablet Take 1 tablet (75 mg) by mouth 2 times daily 180 tablet 0     escitalopram (LEXAPRO) 10 MG tablet Take 1 tablet (10 mg) by mouth daily 30 tablet 0     fluticasone (FLONASE) 50 MCG/ACT nasal spray USE ONE TO TWO SPRAYS IN EACH NOSTRIL EVERY DAY 16 g 11     fluticasone (FLONASE) 50 MCG/ACT spray Spray 2 sprays in nostril daily       fluticasone-salmeterol (ADVAIR DISKUS) 100-50 MCG/DOSE diskus inhaler Inhale 1 puff into the lungs two times daily       HYDROcodone-acetaminophen (NORCO) 5-325 MG tablet Take 1 tablet by mouth daily 25 tablet 0     minocycline (MINOCIN/DYNACIN) 100 MG capsule TK 1 C PO BID  0     Misc. Devices (WALKER AUTO GLIDES) MISC 2 Wheeled Walker for home use. For 6 weeks.       Multiple Vitamin (MULTI-VITAMIN PO) Take  by mouth.       nystatin (MYCOSTATIN) 475593 UNIT/GM external powder Apply topically 3 times daily as needed 60 g 1     nystatin POWD        olopatadine (PATANOL) 0.1 % ophthalmic solution Place 1 drop into both eyes 2 times daily. 1 mL 3     polyethylene glycol (MIRALAX/GLYCOLAX) Packet Take 1 packet by mouth daily       rifampin (RIFADIN) 300 MG capsule TK 1 C PO BID BEFORE MEALS FOR 90 DAYS  0     senna-docusate (SENOKOT-S;PERICOLACE) 8.6-50 MG per tablet Take 1 tablet by mouth two times daily       simvastatin (ZOCOR) 20 MG tablet TAKE ONE TABLET BY MOUTH EVERY DAY AT BEDTIME 90 tablet 3     tiotropium (SPIRIVA HANDIHALER) 18 MCG capsule Inhale contents of one capsule daily.       VENTOLIN  (90 BASE) MCG/ACT Inhaler INHALE 2 PUFFS INTO THE LUNGS EVERY 6 HOURS AS NEEDED FOR SHORTNESS OF BREATH/DYSPNEA 18 g 12     Allergies   Allergen Reactions      "Nickel Rash     Dust Mite Extract      Erythromycin      unknown     No Clinical Screening - See Comments Hives     Penicillins Hives     Sulfa Drugs Hives     Macrobid [Nitrofuran Derivatives] Rash     Nitrofurantoin Rash     Quinolones Rash     Recent Labs   Lab Test 11/30/18  1520 09/26/18 09/24/18 10/06/17  1205 07/07/17  1839  08/05/16  1240  07/29/15  0841  05/02/13  0906   *  --   --  91  --   --  82  --  103   < >  --    HDL 61  --   --  50  --   --  56  --  81   < >  --    TRIG 132  --   --  101  --   --  122  --  57   < >  --    ALT  --   --  16  --  24  --   --   --  23   < >  --    CR  --  0.61  --   --  0.61   < >  --    < > 0.53   < >  --    GFRESTIMATED  --  >60  --   --  >90  Non  GFR Calc     < >  --    < > >90  Non  GFR Calc     < >  --    GFRESTBLACK  --  >60  --   --  >90  African American GFR Calc     < >  --    < > >90   GFR Calc     < >  --    POTASSIUM  --  3.8  --   --  3.8   < >  --   --  4.1  --   --    TSH  --   --   --   --   --   --   --   --   --   --  3.29    < > = values in this interval not displayed.      BP Readings from Last 3 Encounters:   08/28/19 126/64   07/26/19 126/79   03/23/19 127/64    Wt Readings from Last 3 Encounters:   08/28/19 75.3 kg (166 lb)   07/26/19 76.2 kg (168 lb)   03/23/19 74.8 kg (165 lb)                      Reviewed and updated as needed this visit by Provider         Review of Systems   ROS COMP: CONSTITUTIONAL: NEGATIVE for fever, chills, change in weight  INTEGUMENTARY/SKIN: NEGATIVE for worrisome rashes, moles or lesions  ENT/MOUTH: as above  RESP:as above,no sob  CV: NEGATIVE for chest pain, palpitations or peripheral edema  GI: NEGATIVE for nausea, abdominal pain, heartburn, or change in bowel habits  MUSCULOSKELETAL: hip pain  PSYCHIATRIC: as above  ROS otherwise negative      Objective    /64   Pulse 97   Temp 93  F (33.9  C) (Oral)   Resp 18   Ht 1.65 m (5' 4.96\")   Wt 75.3 kg " (166 lb)   SpO2 95%   BMI 27.66 kg/m     Body mass index is 27.66 kg/m .  Physical Exam   GENERAL: healthy, alert and no distress  EYES: Eyes grossly normal to inspection, PERRL and conjunctivae and sclerae normal  HENT: ear canals and TM's normal, nose and mouth without ulcers or lesions  NECK: no adenopathy, no asymmetry, masses, or scars and thyroid normal to palpation  RESP: lungs clear to auscultation - no rales, rhonchi or wheezes  CV: regular rate and rhythm, normal S1 S2, no S3 or S4, no murmur, click or rub, no peripheral edema and peripheral pulses strong  ABDOMEN: soft, nontender, no hepatosplenomegaly, no masses and bowel sounds normal  MS: no gross musculoskeletal defects noted, no edema  PSYCH: anxious, judgement and insight intact and appearance well groomed    Diagnostic Test Results:  Labs reviewed in Epic        Assessment & Plan     1. Major depressive disorder, recurrent episode, mild (H)  Advised take Lexapro  SEE EPIC care orders  The potential side effects of this medication have been discussed with the patient.  Call if any significant problems with these are experienced.  Follow up 1 month  - escitalopram (LEXAPRO) 10 MG tablet; Take 1 tablet (10 mg) by mouth daily  Dispense: 30 tablet; Refill: 0    2. Infection and inflammatory reaction due to internal left hip prosthesis, subsequent encounter  Seeing Ortho    3. Left hip pain  Seeing ortho and ID    4. Metastatic breast cancer (H)  Sees Oncology    5. Chronic obstructive pulmonary disease, unspecified COPD type (H)  Stable     6. Upper respiratory tract infection, unspecified type  Advised symptomatic Treatment  Res  Follow up if worse         Return in about 1 month (around 9/28/2019) for Phone visit, recheck.    Betty Ham MD  [facility]

## 2019-08-29 RX ORDER — DOXYCYCLINE HYCLATE 100 MG
100 TABLET ORAL 2 TIMES DAILY
Qty: 20 TABLET | Refills: 0 | Status: SHIPPED | OUTPATIENT
Start: 2019-08-29 | End: 2019-10-08

## 2019-08-29 ASSESSMENT — ANXIETY QUESTIONNAIRES: GAD7 TOTAL SCORE: 8

## 2019-09-08 ENCOUNTER — TRANSFERRED RECORDS (OUTPATIENT)
Dept: HEALTH INFORMATION MANAGEMENT | Facility: CLINIC | Age: 79
End: 2019-09-08

## 2019-09-12 ENCOUNTER — TRANSFERRED RECORDS (OUTPATIENT)
Dept: HEALTH INFORMATION MANAGEMENT | Facility: CLINIC | Age: 79
End: 2019-09-12

## 2019-09-13 ENCOUNTER — TELEPHONE (OUTPATIENT)
Dept: FAMILY MEDICINE | Facility: CLINIC | Age: 79
End: 2019-09-13

## 2019-09-13 NOTE — TELEPHONE ENCOUNTER
Called and spoke with Yuko, patient's  from [facility]. She works with patient through her medical plan and has met with patient and patient's daughter Jackie.   Reports patient has a lot going on and was recently in the ED for a fall that happened at home.   Orthopedics was able to reduce her hip in the ED but then found that she had a fractured L wrist and has a case on. Patient is wearing a brace on hip and mobility is . Does use a cane with her R hand.   Yuko has been working with patient and talking about getting more help in the home as she currently lives alone. Patient insists on being independent but is OK with trying to get some more help in the home.   Yuko is wondering if patient could get home care orders or if she needs to be seen for ED follow-up and can get referral then.  Yuko does not need an update as patient is aware of this and patient can be notified.    Tatiana Palma RN

## 2019-09-13 NOTE — TELEPHONE ENCOUNTER
Reason for Call:  Other call back    Detailed comments:  Nuria was in the ER, [facility] for a fall. Yuko would like to discuss options for support at home. Please call her.     Phone Number Patient can be reached at: Other phone number:      Best Time:  Any     Can we leave a detailed message on this number? YES    Call taken on 9/13/2019 at 8:47 AM by Nati Trinidad

## 2019-09-13 NOTE — TELEPHONE ENCOUNTER
Huddled with Dr. Ham.    Patient needs to be seen by Dr. Ham for ED follow-up and then can discuss HC referral.     Called and spoke with patient and gave information.   Scheduled patient for ED follow-up on 9/19/19 with PCP.     Tatiana Palma RN

## 2019-09-16 ENCOUNTER — TELEPHONE (OUTPATIENT)
Dept: FAMILY MEDICINE | Facility: CLINIC | Age: 79
End: 2019-09-16

## 2019-09-16 ENCOUNTER — TRANSFERRED RECORDS (OUTPATIENT)
Dept: HEALTH INFORMATION MANAGEMENT | Facility: CLINIC | Age: 79
End: 2019-09-16

## 2019-09-16 NOTE — TELEPHONE ENCOUNTER
Called and spoke with Yuko, patient's  from [facility]. Reports she was in contact with patient and patient's daughter who reports she had a rough weekend with a lot of pain and unable to complete ADLs. Patient has been having trouble with transferring and ambulation. Yuko did recommend that patient go to [facility] over the weekend but they did not. She did speak with patient/daughter today and they will be going to [facility] now.   Yuko was wondering about possibly admitting patient to a rehab facility or if that is a possibility. Advised her we should wait to see what  recommends for patient. She agrees and thinks they may recommend she go to [facility] first.  Advised her we will keep scheduled appt for 9/19/19 but can get her in sooner if these need to be seen earlier.    Tatiana Palma RN

## 2019-09-16 NOTE — TELEPHONE ENCOUNTER
Reason for Call:  Other call back    Detailed comments: Yuko @  calling regarding wanting to talk to Tatiana on a follow up to prior conversation earlier today. Please call to advise.     Phone Number Patient can be reached at: Other phone number:  [phone]    Best Time: any     Can we leave a detailed message on this number? YES    Call taken on 9/16/2019 at 1:23 PM by Brigitte Donnelly

## 2019-09-16 NOTE — TELEPHONE ENCOUNTER
Reason for Call:  Other call back    Detailed comments: Yuko @  calling regarding patient stating she had a difficult weekend with pain and she wasn't able to complete her ADL's. Please call to advise. Yuko did request for Tatiana MENDEZ     Phone Number Patient can be reached at: Other phone number:  [phone]    Best Time: Any     Can we leave a detailed message on this number? YES    Call taken on 9/16/2019 at 10:31 AM by Brigitte Donnelly

## 2019-09-16 NOTE — TELEPHONE ENCOUNTER
Yuko called back again - see other TE.   Called and spoke with Yuko. Reports that patient was seen at [facility] but it wasn't very helpful and is requesting RN to call patient & daughter Nery back     Called and spoke with patient and patient's daughter Nery. Reports that she is not doing well and her pain is bad, however, she did just  a prescription for oxycodone. Advised her on side effects.   Scheduled patient for appt tomorrow at 11:20 AM. No further questions.    Tatiana Palma, RN

## 2019-09-17 ENCOUNTER — OFFICE VISIT (OUTPATIENT)
Dept: FAMILY MEDICINE | Facility: CLINIC | Age: 79
End: 2019-09-17
Payer: COMMERCIAL

## 2019-09-17 ENCOUNTER — TELEPHONE (OUTPATIENT)
Dept: FAMILY MEDICINE | Facility: CLINIC | Age: 79
End: 2019-09-17

## 2019-09-17 VITALS
WEIGHT: 166 LBS | HEART RATE: 110 BPM | RESPIRATION RATE: 16 BRPM | HEIGHT: 64 IN | BODY MASS INDEX: 28.34 KG/M2 | OXYGEN SATURATION: 98 % | TEMPERATURE: 98 F | SYSTOLIC BLOOD PRESSURE: 136 MMHG | DIASTOLIC BLOOD PRESSURE: 72 MMHG

## 2019-09-17 DIAGNOSIS — J06.9 UPPER RESPIRATORY TRACT INFECTION, UNSPECIFIED TYPE: ICD-10-CM

## 2019-09-17 DIAGNOSIS — C50.919 CARCINOMA OF BREAST METASTATIC TO BONE, UNSPECIFIED LATERALITY (H): ICD-10-CM

## 2019-09-17 DIAGNOSIS — C79.51 BONE METASTASIS: ICD-10-CM

## 2019-09-17 DIAGNOSIS — M51.369 DDD (DEGENERATIVE DISC DISEASE), LUMBAR: ICD-10-CM

## 2019-09-17 DIAGNOSIS — S73.005D DISLOCATION OF LEFT HIP, SUBSEQUENT ENCOUNTER: ICD-10-CM

## 2019-09-17 DIAGNOSIS — S52.92XD CLOSED FRACTURE OF LEFT FOREARM WITH ROUTINE HEALING, SUBSEQUENT ENCOUNTER: Primary | ICD-10-CM

## 2019-09-17 DIAGNOSIS — C79.51 CARCINOMA OF BREAST METASTATIC TO BONE, UNSPECIFIED LATERALITY (H): ICD-10-CM

## 2019-09-17 PROCEDURE — 99214 OFFICE O/P EST MOD 30 MIN: CPT | Performed by: FAMILY MEDICINE

## 2019-09-17 RX ORDER — SIMVASTATIN 20 MG
TABLET ORAL
Qty: 90 TABLET | Refills: 3 | Status: SHIPPED | OUTPATIENT
Start: 2019-09-17 | End: 2020-11-17

## 2019-09-17 RX ORDER — DICLOFENAC SODIUM 75 MG/1
75 TABLET, DELAYED RELEASE ORAL 2 TIMES DAILY
Qty: 180 TABLET | Refills: 0 | Status: SHIPPED | OUTPATIENT
Start: 2019-09-17 | End: 2020-11-10

## 2019-09-17 RX ORDER — ESCITALOPRAM OXALATE 10 MG/1
10 TABLET ORAL DAILY
Qty: 30 TABLET | Refills: 0 | Status: SHIPPED | OUTPATIENT
Start: 2019-09-17 | End: 2019-09-24

## 2019-09-17 ASSESSMENT — ANXIETY QUESTIONNAIRES
IF YOU CHECKED OFF ANY PROBLEMS ON THIS QUESTIONNAIRE, HOW DIFFICULT HAVE THESE PROBLEMS MADE IT FOR YOU TO DO YOUR WORK, TAKE CARE OF THINGS AT HOME, OR GET ALONG WITH OTHER PEOPLE: VERY DIFFICULT
GAD7 TOTAL SCORE: 11
5. BEING SO RESTLESS THAT IT IS HARD TO SIT STILL: NOT AT ALL
6. BECOMING EASILY ANNOYED OR IRRITABLE: SEVERAL DAYS
1. FEELING NERVOUS, ANXIOUS, OR ON EDGE: MORE THAN HALF THE DAYS
7. FEELING AFRAID AS IF SOMETHING AWFUL MIGHT HAPPEN: MORE THAN HALF THE DAYS
3. WORRYING TOO MUCH ABOUT DIFFERENT THINGS: MORE THAN HALF THE DAYS
2. NOT BEING ABLE TO STOP OR CONTROL WORRYING: MORE THAN HALF THE DAYS

## 2019-09-17 ASSESSMENT — PATIENT HEALTH QUESTIONNAIRE - PHQ9
SUM OF ALL RESPONSES TO PHQ QUESTIONS 1-9: 8
5. POOR APPETITE OR OVEREATING: MORE THAN HALF THE DAYS

## 2019-09-17 ASSESSMENT — MIFFLIN-ST. JEOR: SCORE: 1202.97

## 2019-09-17 NOTE — TELEPHONE ENCOUNTER
Reason for call:  Other   Patient called regarding (reason for call): call back  Additional comments: [facility]  is calling because she wants the care team to know that the patients Medicare does not require a 3 day stay for TCU. Please call back with any questions     Phone number to reach patient:  Other phone number:  [phone]    Best Time:  any    Can we leave a detailed message on this number?  YES

## 2019-09-17 NOTE — PROGRESS NOTES
Subjective     Nuria Shelley is a 79 year old female who presents to clinic today for the following health issues:    HPI   ED/UC Followup:    Facility:  [facility]  Date of visit: 19  Reason for visit: Hip dislocation, left, initial encounter (HC) (Primary Dx) which was reduced  Current Status: left Forearm fracture     1. Patient would like a referral for help in her home  2. Patient family member requesting [employer] paperwork    Pt has Followed up with Dr Barillas  She has had Frequent falls  Daughter Lives in [place]  Pt is Homebound and hard time coming to clinic    Patient Active Problem List   Diagnosis     IBS (irritable bowel syndrome)     GERD (gastroesophageal reflux disease)     Urinary incontinence     Major depressive disorder, recurrent episode, mild (H)     DUPUYTREN'S CONTRACTURE - right     Hyperlipidemia LDL goal <130     Advanced directives, counseling/discussion     Diverticulosis     COPD (chronic obstructive pulmonary disease) (H)     Chronic nasal congestion     Malignant neoplasm of right female breast, unspecified site of breast     Bone metastasis (H)     Fracture of hip, left, closed, initial encounter (H)     Metastatic breast cancer (H)     Past Surgical History:   Procedure Laterality Date     BREAST LUMPECTOMY, RT/LT      Breast Lumpectomy RT/     BUNIONECTOMY RT/LT       C  DELIVERY ONLY       HC EXCISION BREAST LESION, OPEN >=1      bilateral     HYSTERECTOMY, CERVIX STATUS UNKNOWN      partial     SURGICAL HISTORY OF -       Left hand surgery     SURGICAL HISTORY OF -   2005    Left knee surgery       Social History     Tobacco Use     Smoking status: Former Smoker     Years: 20.00     Types: Cigarettes     Last attempt to quit: 1980     Years since quittin.7     Smokeless tobacco: Never Used   Substance Use Topics     Alcohol use: Yes     Comment: ocass     Family History   Problem Relation Age of Onset     Heart Disease Mother       Diabetes Mother         d 70 from CAD     Cerebrovascular Disease Father         d age 82     Heart Disease Maternal Grandmother      Diabetes Brother      Heart Disease Brother         MI age 60     Heart Disease Sister         d age 52 from MI     Diabetes Sister      Breast Cancer Daughter         age 50     Heart Disease Brother      Diabetes Brother      Alzheimer Disease Brother      Cancer Brother         pancreatic ca     Anesthesia Reaction No family hx of          Current Outpatient Medications   Medication Sig Dispense Refill     anastrozole (ARIMIDEX) 1 MG tablet Take 1 mg by mouth daily       Calcium-Vitamin D (CALCIUM + D PO) Take  by mouth. 1200 mg calcium daily with vitamin D 1000 units daily       diclofenac (VOLTAREN) 75 MG EC tablet Take 1 tablet (75 mg) by mouth 2 times daily 180 tablet 0     escitalopram (LEXAPRO) 10 MG tablet Take 1 tablet (10 mg) by mouth daily 30 tablet 0     fluticasone (FLONASE) 50 MCG/ACT nasal spray USE ONE TO TWO SPRAYS IN EACH NOSTRIL EVERY DAY 16 g 11     fluticasone-salmeterol (ADVAIR DISKUS) 100-50 MCG/DOSE diskus inhaler Inhale 1 puff into the lungs two times daily       HYDROcodone-acetaminophen (NORCO) 5-325 MG tablet Take 1 tablet by mouth daily 25 tablet 0     Misc. Devices (WALKER AUTO GLIDES) MISC 2 Wheeled Walker for home use. For 6 weeks.       Multiple Vitamin (MULTI-VITAMIN PO) Take  by mouth.       nystatin (MYCOSTATIN) 913260 UNIT/GM external powder Apply topically 3 times daily as needed 60 g 1     nystatin POWD        olopatadine (PATANOL) 0.1 % ophthalmic solution Place 1 drop into both eyes 2 times daily. 1 mL 3     senna-docusate (SENOKOT-S;PERICOLACE) 8.6-50 MG per tablet Take 1 tablet by mouth two times daily       simvastatin (ZOCOR) 20 MG tablet TAKE ONE TABLET BY MOUTH EVERY DAY AT BEDTIME 90 tablet 3     tiotropium (SPIRIVA HANDIHALER) 18 MCG capsule Inhale contents of one capsule daily.       VENTOLIN  (90 BASE) MCG/ACT Inhaler  INHALE 2 PUFFS INTO THE LUNGS EVERY 6 HOURS AS NEEDED FOR SHORTNESS OF BREATH/DYSPNEA 18 g 12     aspirin 325 MG EC tablet Take 325 mg by mouth daily       minocycline (MINOCIN/DYNACIN) 100 MG capsule TK 1 C PO BID  0     polyethylene glycol (MIRALAX/GLYCOLAX) Packet Take 1 packet by mouth daily       rifampin (RIFADIN) 300 MG capsule TK 1 C PO BID BEFORE MEALS FOR 90 DAYS  0     Allergies   Allergen Reactions     Nickel Rash     Dust Mite Extract      Erythromycin      unknown     No Clinical Screening - See Comments Hives     Penicillins Hives     Sulfa Drugs Hives     Macrobid [Nitrofuran Derivatives] Rash     Nitrofurantoin Rash     Quinolones Rash     Recent Labs   Lab Test 11/30/18  1520 09/26/18 09/24/18 10/06/17  1205 07/07/17  1839  08/05/16  1240  07/29/15  0841  05/02/13  0906   *  --   --  91  --   --  82  --  103   < >  --    HDL 61  --   --  50  --   --  56  --  81   < >  --    TRIG 132  --   --  101  --   --  122  --  57   < >  --    ALT  --   --  16  --  24  --   --   --  23   < >  --    CR  --  0.61  --   --  0.61   < >  --    < > 0.53   < >  --    GFRESTIMATED  --  >60  --   --  >90  Non  GFR Calc     < >  --    < > >90  Non  GFR Calc     < >  --    GFRESTBLACK  --  >60  --   --  >90  African American GFR Calc     < >  --    < > >90   GFR Calc     < >  --    POTASSIUM  --  3.8  --   --  3.8   < >  --   --  4.1  --   --    TSH  --   --   --   --   --   --   --   --   --   --  3.29    < > = values in this interval not displayed.      BP Readings from Last 3 Encounters:   09/17/19 136/72   08/28/19 126/64   07/26/19 126/79    Wt Readings from Last 3 Encounters:   09/17/19 75.3 kg (166 lb)   08/28/19 75.3 kg (166 lb)   07/26/19 76.2 kg (168 lb)                pt has mild runny nose,no cough ,no sob  No fever      Reviewed and updated as needed this visit by Provider  Tobacco         Review of Systems   ROS COMP: CONSTITUTIONAL: NEGATIVE for fever,  "chills, change in weight  ENT/MOUTH: as above  RESP: as above  CV: NEGATIVE for chest pain, palpitations or peripheral edema  GI: none  MUSCULOSKELETAL: seeing ortho  NEURO: NEGATIVE for weakness, dizziness or paresthesias  PSYCHIATRIC: anxiety      Objective    /72   Pulse 110   Temp 98  F (36.7  C) (Oral)   Resp 16   Ht 1.626 m (5' 4\")   Wt 75.3 kg (166 lb)   SpO2 98%   Breastfeeding? No   BMI 28.49 kg/m    Body mass index is 28.49 kg/m .  Physical Exam   GENERAL: healthy, alert and no distress  EYES: Eyes grossly normal to inspection, PERRL and conjunctivae and sclerae normal  HENT: ear canals and TM's normal, nose and mouth without ulcers or lesions  NECK: no adenopathy, no asymmetry, masses, or scars and thyroid normal to palpation  RESP: lungs clear to auscultation - no rales, rhonchi or wheezes  CV: regular rate and rhythm, normal S1 S2, no S3 or S4, no murmur, click or rub, no peripheral edema and peripheral pulses strong  ABDOMEN: soft, nontender, no hepatosplenomegaly, no masses and bowel sounds normal  SKIN: no suspicious lesions or rashes  Uses a cane    Diagnostic Test Results:  Labs reviewed in Epic        Assessment & Plan     1. Closed fracture of left forearm with routine healing, subsequent encounter  Pt has a cast and will follow up with her Ortho  - DEXA HIP/PELVIS/SPINE - Future; Future  - escitalopram (LEXAPRO) 10 MG tablet; Take 1 tablet (10 mg) by mouth daily  Dispense: 30 tablet; Refill: 0  - simvastatin (ZOCOR) 20 MG tablet; TAKE ONE TABLET BY MOUTH EVERY DAY AT BEDTIME  Dispense: 90 tablet; Refill: 3  - HOME CARE NURSING REFERRAL    2. Dislocation of left hip, subsequent encounter  This was reduced in ER  Pt has been advised to use  a walker and follow up Dr Barillas  - DEXA HIP/PELVIS/SPINE - Future; Future  - escitalopram (LEXAPRO) 10 MG tablet; Take 1 tablet (10 mg) by mouth daily  Dispense: 30 tablet; Refill: 0  - simvastatin (ZOCOR) 20 MG tablet; TAKE ONE TABLET BY MOUTH " EVERY DAY AT BEDTIME  Dispense: 90 tablet; Refill: 3  - HOME CARE NURSING REFERRAL    3. DDD (degenerative disc disease), lumbar    - diclofenac (VOLTAREN) 75 MG EC tablet; Take 1 tablet (75 mg) by mouth 2 times daily  Dispense: 180 tablet; Refill: 0    4. Upper respiratory tract infection, unspecified type  Advised symptomatic Treatment  Follow up 1 week if not better/sooner if worse      5. Carcinoma of breast metastatic to bone, unspecified laterality (H)  Sees Dr Tapia    6. Bone metastasis (H)  Notes reviewed oncology  Long discussion with daughter and patient.  Patient wants to [employer] form filled out which she is going to drop off later today.  Also is becoming hard for her to manage at home alone, due to her frequent falls.  I have put in a referral for home care, to evaluate home situation, possible therapy at home.  We also discussed about about gait evaluation and using a walker at all times.  We discussed also about assisted living.  Patient's daughter lives in [place], and they are thinking about this.  They will talk to a  and see what options are available.    Return in about 3 months (around 12/17/2019) for recheck.    Betty Ham MD  [facility]

## 2019-09-18 RX ORDER — DICLOFENAC SODIUM 75 MG/1
TABLET, DELAYED RELEASE ORAL
Qty: 180 TABLET | Refills: 0 | OUTPATIENT
Start: 2019-09-18

## 2019-09-18 ASSESSMENT — ANXIETY QUESTIONNAIRES: GAD7 TOTAL SCORE: 11

## 2019-09-23 ENCOUNTER — TELEPHONE (OUTPATIENT)
Dept: FAMILY MEDICINE | Facility: CLINIC | Age: 79
End: 2019-09-23

## 2019-09-23 DIAGNOSIS — F33.0 MAJOR DEPRESSIVE DISORDER, RECURRENT EPISODE, MILD (H): Primary | ICD-10-CM

## 2019-09-23 NOTE — TELEPHONE ENCOUNTER
Huddled with Dr. Ham.   Patient cannot be on Paxil. Would she want to try another medication?    Left message for patient to call RN hotline [phone].     Tatiana Palma RN

## 2019-09-23 NOTE — TELEPHONE ENCOUNTER
Patient was started on escitalopram 10 mg on 8/28/19 and was previously on Paxil 20 mg daily but was discontinued due to change to escitalopram.  This was already discussed with patient on 8/28/19.    Tatiana Palma RN

## 2019-09-23 NOTE — TELEPHONE ENCOUNTER
"Patient returned call to RN Hotline. Patient states she looked at the [facility] list and agrees she should not be on the Paxil either.   Reports she mentally feels OK on the escitalopram but does not like how it's making her body feel. Reports it feels like she's gotten beat, her knees and hip kept popping, body aches and feels like her \"joints are slipping\".   States she stopped the escitalopram 2 days ago and is already feeling a little bit better.   Is there another option for her? States she would rather be on nothing than stay on escitalopram.    Tatiana Palma, RN    "

## 2019-09-23 NOTE — TELEPHONE ENCOUNTER
Patient came to the pharmacy and stated that she doesn't like taking escitalopram because she feels like her joints are getting loose and slipping while taking this.  She wants to go back on Paxil.      I explained to her Paxil is on the [facility]ers list and would not be recommended based off her age.      Please advise.     Thank you,  Jerri Gonzalez, PharmD  [facility]  [phone]

## 2019-09-24 RX ORDER — SERTRALINE HYDROCHLORIDE 25 MG/1
25 TABLET, FILM COATED ORAL DAILY
Qty: 30 TABLET | Refills: 2 | Status: CANCELLED | OUTPATIENT
Start: 2019-09-24

## 2019-09-24 NOTE — TELEPHONE ENCOUNTER
Huddled with Dr. Ham.   Patient can stopped escitalopram and start Zoloft 25 mg daily.   Escitalopram 25 mg removed from MAR.     Called and spoke with patient and gave information.   Patient states she will do some research and call back with what she decides if she wants to try the medication. States she will call RN Hotline back within the next couple of days.     Tatiana Palma RN

## 2019-09-26 ENCOUNTER — TELEPHONE (OUTPATIENT)
Dept: FAMILY MEDICINE | Facility: CLINIC | Age: 79
End: 2019-09-26

## 2019-09-26 DIAGNOSIS — S73.005D DISLOCATION OF LEFT HIP, SUBSEQUENT ENCOUNTER: ICD-10-CM

## 2019-09-26 DIAGNOSIS — S52.92XD CLOSED FRACTURE OF LEFT FOREARM WITH ROUTINE HEALING, SUBSEQUENT ENCOUNTER: Primary | ICD-10-CM

## 2019-09-26 NOTE — TELEPHONE ENCOUNTER
Spoke to HC and they stated they never received a referral. Please place again.  Amanda LARSON CMA ([facility])

## 2019-09-26 NOTE — TELEPHONE ENCOUNTER
Spoke with care coordination. It would be best to refer to [facility] since they offer all other services and can get clinic SW involved if needed. New referral printed, demographic sheet printed, last office visit note faxed to [facility] at [phone]. Pt was notified that her referral was faxed to [facility] home care and she should be hearing from them regarding this. Also gave her the phone number for the agency.    Amanda Padilla RN  [facility]

## 2019-09-26 NOTE — TELEPHONE ENCOUNTER
Spoke with pt. States she thinks the dizziness all they years was from the paroxetine. She is afraid to try anything else because she has had falls because of the dizziness. She still wants to do research on the zoloft and will call if she decides to try it.  Also mentioned she can hear bubbling and crackling in her right ear. Wanted Dr. Ham to be aware of this.    Amanda Padilla RN  [facility]

## 2019-09-26 NOTE — TELEPHONE ENCOUNTER
Received call from Jackie VEGA with [facility]. [facility] is not in network for patient's insurance. Agencies that can be used are:    1. [facility] [phone].  2. [facility] [phone]  3. [facility] [phone]    Does provider have a preference on home care agency for pt? Will need to choose an agency and send referral to one of the covered agencies.    Amanda Padilla RN  [facility]

## 2019-09-26 NOTE — TELEPHONE ENCOUNTER
Patient called checking on status of HC referral  It has been over 1 week since referral was placed on 9/17/19 and she has not heard anything     Please call HC to verify that they got the referral and ask them to reach out to patient with updates    Mary Anne Galarza RN

## 2019-09-26 NOTE — TELEPHONE ENCOUNTER
Huddled with Dr. Ham. Ok to refer to a different home care agency. Contacted agencies HC referred to:    1. [facility] does not have [facility] work  2. [facility] full through Monday, so cannot take referral  3.[facility] does not have skilled for adults in pt's area, only pediatrics.    Can care coordination assist with home care agencies this homecare referral can be sent to please?     Amanda Padilla RN  [facility]

## 2019-09-26 NOTE — TELEPHONE ENCOUNTER
Called and left detailed message on 's nurseJackie's voicemail with verbal ok for SN, PT, ST, and SW for frequent falls, closed fracture of left forearm and dislocation of left hip per Dr. Betty Ham. Referral had been placed in [facility] and was not received. Call back to the RN hotline at [phone] if any questions regarding referral.     Amanda Padilla RN  [facility]

## 2019-09-27 NOTE — TELEPHONE ENCOUNTER
Called and spoke with patient and gave information. Patient states it feels like there is fluid in her ears from allergies. Advised her to schedule follow-up if not improving & to discuss medications. States she will see how things go and do some research & call to schedule appt later. No further questions.    Tatiana Palma, RN

## 2019-09-27 NOTE — TELEPHONE ENCOUNTER
Esthela called RN hotline regarding homecare referral. She wanted to clarify orders. Advised it is for SN, PT, OT, she wanted to know if she would benefit from a HHA and if she needs ST. Advised that nurse should assess for these services. They will accept the referral and will see her over the weekend. Asked intake to follow up with clinic if pt needs social work as our clinic can help with that service.    Amanda Padilla RN  [facility]

## 2019-09-30 DIAGNOSIS — R09.81 CHRONIC NASAL CONGESTION: Primary | ICD-10-CM

## 2019-09-30 RX ORDER — FEXOFENADINE HCL 60 MG/1
60 TABLET, FILM COATED ORAL 2 TIMES DAILY
Qty: 180 TABLET | Refills: 0 | Status: SHIPPED | OUTPATIENT
Start: 2019-09-30 | End: 2020-10-07

## 2019-09-30 NOTE — TELEPHONE ENCOUNTER
Patient called the pharmacy looking for a prescription for allegra 180.    Thank you,  Jerri Gonzalez, PharmD  [facility]  [phone]

## 2019-09-30 NOTE — TELEPHONE ENCOUNTER
Spoke with pt to clarify dosing. Pt is requesting a script for Allegra 60mg bid. Routing to provider as medication is not on active med list.    Amanda Padilla RN  [facility]

## 2019-10-02 ENCOUNTER — TELEPHONE (OUTPATIENT)
Dept: FAMILY MEDICINE | Facility: CLINIC | Age: 79
End: 2019-10-02

## 2019-10-02 NOTE — TELEPHONE ENCOUNTER
Reason for Call: Request for an order or referral:    Order or referral being requested: Verbal Orders for Physical Therapy     Date needed: as soon as possible    Has the patient been seen by the PCP for this problem? Not Applicable    Additional comments:     x2 for 4 weeks  x1 for 2 weeks     Balance, strengthening , gate including stairs and home exercise  program     Phone number Patient can be reached at:  Other phone number:  [phone]    Best Time:  Any     Can we leave a detailed message on this number?  YES    Call taken on 10/2/2019 at 2:12 PM by Brigitte Donnelly

## 2019-10-02 NOTE — TELEPHONE ENCOUNTER
Called and left detailed message for [facility] and gave verbal OK for orders below.   Advised to call back RN Hotline if questions.     Tatiana Palma RN

## 2019-10-02 NOTE — TELEPHONE ENCOUNTER
Left message for Brandy JONES to call RN Hotline.   VM did not specify who's VM it was.    Tatiana Palma, RN

## 2019-10-02 NOTE — TELEPHONE ENCOUNTER
Reason for Call: Request for an order or referral:    Order or referral being requested: Verbal Orders for Occupational Therapy     Date needed: as soon as possible    Has the patient been seen by the PCP for this problem? Not Applicable    Additional comments:     x2 a week for 4 weeks     Starting 10/01 TE, TA, Coginition & ADL     Phone number Patient can be reached at:  Other phone number:  [phone]    Best Time:  Any     Can we leave a detailed message on this number?  YES    Call taken on 10/2/2019 at 12:33 PM by Brigitte Donnelly

## 2019-10-08 ENCOUNTER — MEDICAL CORRESPONDENCE (OUTPATIENT)
Dept: HEALTH INFORMATION MANAGEMENT | Facility: CLINIC | Age: 79
End: 2019-10-08

## 2019-10-08 NOTE — TELEPHONE ENCOUNTER
Fax to be reviewed by the provider Home Care Plan of Care Certification     Who is the it from? [facility] Certification 9/28/2019-11/26/2019  This was faxed to [facility]  Where was the fax placed? Given to MA/RN  What number is listed as a contact on the fax?     Phone: [phone] Fax: [phone]    Please fax to above    Has the patient signed a consent form for release of information? Not Applicable    Additional comments:

## 2019-10-08 NOTE — TELEPHONE ENCOUNTER
Reviewed [facility] med list and reconciled against Epic med list    Discrepancies noted-  Listed on Epic, not on   1. Aspirin 325 mg, take 1 tablet daily   2. Diclofenac 75 mg tablet, take 1 tablet BID  3. Fexofenadine (Allegra) 60 mg tablet, take 1 tablet BID  4. Hydrocodone-acetaminophen, take 1 tablet daily  5. Minocycline 100 mg capsule, take 1 capsule BID  6. Nystatin powder, apply topically 3 times daily PRN  7. Olopatadine (Patanol) 0.1% ophthalmic solution, place 1 drop into both eyes BID  8. Miralax packet, take 1 packet daily  9. Rifampin (Rifadine) 300 mg capsule, take 1 capsule BID before meals for 90 days  10. Simvastatin 20 mg tablet, take 1 tablet daily at bedtime  11. Spiriva Handihaler 18 mcg capsule, inhale contents of one capsule daily.     Listed on , not on Epic  1. Oxycodone 5 mg tablet, take 1 tablet q4h PRN   2. Acetaminophen 500 mg, take 2 tablets up to 4 times daily PRN    Dosage Questions  1. Calcium-Vitamin D 1200 mg-1000 unit(s), take 1 daily - on Epic  2. Calcium-Vitamin D 1200 mg-500 units, take 1 daily - on   3. Ventolin inhaler, inhale 2 puffs into lungs q6h PRN  4. Ventolin inhaler, inhale 2 puffs into lungs q4h PRN       Tatiana Palma RN

## 2019-10-08 NOTE — TELEPHONE ENCOUNTER
Confirmed signed and completed Home Health Certification. Charlee Bull,     A copy has been sent to be added to the chart.

## 2019-10-11 NOTE — TELEPHONE ENCOUNTER
Called and spoke with [facility], reports her  is Kirstie Ibarra & transferred to her cell phone.     Left message for Kirstie to call RN hotline [phone].     Tatiana Palma RN

## 2019-10-15 ENCOUNTER — TELEPHONE (OUTPATIENT)
Dept: FAMILY MEDICINE | Facility: CLINIC | Age: 79
End: 2019-10-15

## 2019-10-15 ENCOUNTER — TRANSFERRED RECORDS (OUTPATIENT)
Dept: HEALTH INFORMATION MANAGEMENT | Facility: CLINIC | Age: 79
End: 2019-10-15

## 2019-10-15 NOTE — TELEPHONE ENCOUNTER
Called and left detailed message [facility] and gave verbal OK for orders below.     Tatiana Palma RN

## 2019-10-15 NOTE — TELEPHONE ENCOUNTER
Reason for call:  Home Healthcare Reason for Call:  Home Health Care    Brandy with [facility] called regarding (reason for call): Changing PT orders    Orders are needed for this patient.     PT: Reducing PT starting Oct 8th, 2019. 1 time a week for 5 weeks    Pt Provider: Dr. Ham    Phone Number Homecare Nurse can be reached at: [phone]

## 2019-10-16 ENCOUNTER — TELEPHONE (OUTPATIENT)
Dept: FAMILY MEDICINE | Facility: CLINIC | Age: 79
End: 2019-10-16

## 2019-10-16 ENCOUNTER — MEDICAL CORRESPONDENCE (OUTPATIENT)
Dept: HEALTH INFORMATION MANAGEMENT | Facility: CLINIC | Age: 79
End: 2019-10-16

## 2019-10-16 DIAGNOSIS — Z53.9 DIAGNOSIS NOT YET DEFINED: Primary | ICD-10-CM

## 2019-10-16 PROCEDURE — G0180 MD CERTIFICATION HHA PATIENT: HCPCS | Performed by: FAMILY MEDICINE

## 2019-10-16 NOTE — TELEPHONE ENCOUNTER
Called and left detailed message for Brandy from  and gave verbal OK for orders below.     Tatiana Palma RN

## 2019-10-16 NOTE — TELEPHONE ENCOUNTER
Patient is being discharged from home care.   Please have PCP sign form & fax back.     Tatiana Palma RN

## 2019-10-16 NOTE — TELEPHONE ENCOUNTER
Reason for Call:  Other call back    Detailed comments: Wants verbal discharge orders for home health aid    Phone Number Patient can be reached at: Other phone number:  [phone]    Best Time: anytime    Can we leave a detailed message on this number? YES    Call taken on 10/16/2019 at 11:27 AM by Seun Jay

## 2019-10-23 DIAGNOSIS — K58.1 IRRITABLE BOWEL SYNDROME WITH CONSTIPATION: Primary | ICD-10-CM

## 2019-10-23 NOTE — TELEPHONE ENCOUNTER
Patient is looking for a refill, last prescribed by hospital.    Thank you,  Jerri Gonzalez, PharmD  [facility]  [phone]

## 2019-10-25 ENCOUNTER — TRANSFERRED RECORDS (OUTPATIENT)
Dept: HEALTH INFORMATION MANAGEMENT | Facility: CLINIC | Age: 79
End: 2019-10-25

## 2019-10-25 NOTE — TELEPHONE ENCOUNTER
"Routing refill request to provider for review/approval because:  Medication is reported/historical    Requested Prescriptions   Pending Prescriptions Disp Refills     senna-docusate (SENOKOT-S/PERICOLACE) 8.6-50 MG tablet       Sig: Take 1 tablet by mouth two times daily       Laxatives Protocol Passed - 10/24/2019  7:25 AM        Passed - Patient is age 6 or older        Passed - Recent (12 mo) or future (30 days) visit within the authorizing provider's specialty     Patient has had an office visit with the authorizing provider or a provider within the authorizing providers department within the previous 12 mos or has a future within next 30 days. See \"Patient Info\" tab in inbasket, or \"Choose Columns\" in Meds & Orders section of the refill encounter.              Passed - Medication is active on med list        Tatiana Palma RN  "

## 2019-10-27 ENCOUNTER — TRANSFERRED RECORDS (OUTPATIENT)
Dept: HEALTH INFORMATION MANAGEMENT | Facility: CLINIC | Age: 79
End: 2019-10-27

## 2019-10-28 RX ORDER — AMOXICILLIN 250 MG
1 CAPSULE ORAL
Qty: 60 TABLET | Refills: 11 | Status: SHIPPED | OUTPATIENT
Start: 2019-10-28 | End: 2021-09-22

## 2019-10-29 ENCOUNTER — TELEPHONE (OUTPATIENT)
Dept: FAMILY MEDICINE | Facility: CLINIC | Age: 79
End: 2019-10-29

## 2019-10-29 NOTE — TELEPHONE ENCOUNTER
Reason for call:  Home Healthcare Reason for Call:  Home Health Care    Leslie  with [facility] called regarding (reason for call): Verbal orders    Orders are needed for this patient.     PT: Wants a hold on PT because patient dislocated her hip.    Pt Provider: Dr. Ham    Phone Number Homecare Nurse can be reached at: [phone]    Can we leave a detailed message on this number? YES

## 2019-10-29 NOTE — TELEPHONE ENCOUNTER
Called and left detailed message for [facility] from [facility] and gave verbal OK for orders below.   Advised to call RN Hotline if questions.   ED notes in Epic.     Tatiana Palma RN

## 2019-10-30 ENCOUNTER — TELEPHONE (OUTPATIENT)
Dept: FAMILY MEDICINE | Facility: CLINIC | Age: 79
End: 2019-10-30

## 2019-10-30 DIAGNOSIS — F33.0 MAJOR DEPRESSIVE DISORDER, RECURRENT EPISODE, MILD (H): Primary | ICD-10-CM

## 2019-10-30 NOTE — TELEPHONE ENCOUNTER
Reason for Call:  Other call back    Detailed comments: Yuko (Nurse ) wants to speak to a nurse about an update for pt, more help in home care and is in depression.    Phone Number Patient can be reached at: Other phone number:  [phone]    Best Time: any    Can we leave a detailed message on this number? YES    Call taken on 10/30/2019 at 4:04 PM by Seun Jay

## 2019-10-31 NOTE — TELEPHONE ENCOUNTER
Spoke with Yuko, she is pt's Medicare Advantage plan . Pt has had several episodes of dislocating her hip. Friday was the last time this happened and she had to have her hip reduced. Pt has been seen by [facility] home care. Pt is determined to stay in her own home and does not want to accept help. Yuko is working on resources to increase her success at home. PT did not go well with the first physical therapist, so is going to try a different PT. She will also be giving the home health aide assistance another try. She is giving pt resources for private pay nursing and  care such as [facility].   Pt mentioned to  that she is having depression. When she tried to fill out a PHQ9 questionnaire with pt, pt avoided the questions. She denied a plan to hurt herself. Pt is having concerns about side effects from her medication. Her daughter thinks the anti depressant is impacting her joints. Pt is willing to talk to a therapist.  No need to follow up with . Please contact pt. Please advise.    Amanda Padilla RN  [facility]

## 2019-10-31 NOTE — TELEPHONE ENCOUNTER
Giuseppe with Dr. Ham. Is pt taking Lexapro? Should not be taking Paxil as this is not recommended for her age.   Ok for mental health referral for therapy.  Refer to Ortho for dislocated hip.    Amanda Padilla RN  [facility]

## 2019-10-31 NOTE — TELEPHONE ENCOUNTER
Spoke with pt. States she is not taking any medication for depression. Hasn't been able to research it so she feels comfortable with it. Doesn't want anymore side effects that are worse than the depression. Advised that therapy referral can be placed. She said she can't get out of the house at this time, so is not ready for therapy yet. She did not want to take the number. Advised they will call to schedule, but at this time, she isn't ready. FYI  When writer discussed ortho referral she said she could go back to the provider at [facility] that did her surgery. She mentioned she is having pain in the back of her hand, so wants to discuss this with ortho as well. She will call.   Pt asked writer to contact [facility] to see if another PT will be seeing her because the first PT she worked with was not a good fit for her. Called and they have a male, but pt does not want a male and they also have a PTA, but she is younger and pt wants an experienced PT. Leslie pt's OT will follow up with pt tomorrow on what the agency can offer pt.     Amanda Padilla RN  [facility]

## 2019-11-01 NOTE — TELEPHONE ENCOUNTER
Reason for Call:  Home Health Care    Thiago with [facility] called regarding (reason for call): verbal orders    Orders are needed for this patient. OT    PT: VALERIE    OT:  Start November 4, 2019 1 week 3 , with therapedic  Exercise, therapedic activity and ADL    Skilled Nursing: VALERIE    Pt Provider: Jitendra    Phone Number Homecare Nurse can be reached at: [phone]    Can we leave a detailed message on this number? YES    Phone number patient can be reached at: Home number on file [phone] (home)    Best Time: any    Call taken on 11/1/2019 at 1:37 PM by Ridge De La O

## 2019-11-04 ENCOUNTER — TRANSFERRED RECORDS (OUTPATIENT)
Dept: HEALTH INFORMATION MANAGEMENT | Facility: CLINIC | Age: 79
End: 2019-11-04

## 2019-11-07 ENCOUNTER — MEDICAL CORRESPONDENCE (OUTPATIENT)
Dept: HEALTH INFORMATION MANAGEMENT | Facility: CLINIC | Age: 79
End: 2019-11-07

## 2019-11-20 ENCOUNTER — TELEPHONE (OUTPATIENT)
Dept: FAMILY MEDICINE | Facility: CLINIC | Age: 79
End: 2019-11-20

## 2019-11-20 NOTE — TELEPHONE ENCOUNTER
Left message for Leslie from  to call RN hotline [phone].   Why is patient requesting discharge? Does she meet criteria?    Tatiana Palma RN

## 2019-11-20 NOTE — TELEPHONE ENCOUNTER
Reason for Call: Request for an order or referral:    Order or referral being requested: Requesting discharge at pt request    Date needed: as soon as possible    Has the patient been seen by the PCP for this problem? YES    Additional comments: Leslie's Phone Number [phone]    Phone number Patient can be reached at:  Home number on file [phone] (home)    Best Time:  any    Can we leave a detailed message on this number?  YES    Call taken on 11/20/2019 at 4:49 PM by Seun Jay

## 2019-11-21 NOTE — TELEPHONE ENCOUNTER
Spoke with Leslie OT. States pt has met her goals. Maximizing strength and ROM of left wrist. Pt decided she didn't need therapy anymore and felt like she got ideas for exercises on her own.   Functional goals were met- meal prep, self care, and home making.   Gave verbal ok to discharge from OT. She will send over fax for provider signature.    Amanda Padilla RN  [facility]

## 2019-12-03 ENCOUNTER — TRANSFERRED RECORDS (OUTPATIENT)
Dept: HEALTH INFORMATION MANAGEMENT | Facility: CLINIC | Age: 79
End: 2019-12-03

## 2019-12-09 ENCOUNTER — MEDICAL CORRESPONDENCE (OUTPATIENT)
Dept: HEALTH INFORMATION MANAGEMENT | Facility: CLINIC | Age: 79
End: 2019-12-09

## 2019-12-13 ENCOUNTER — TELEPHONE (OUTPATIENT)
Dept: FAMILY MEDICINE | Facility: CLINIC | Age: 79
End: 2019-12-13

## 2019-12-13 NOTE — TELEPHONE ENCOUNTER
Pt went to ER, 12/03/19, for another hip dislocation. At this time she is not electing to move forward with surgery. On the nurse's discussion with pt, she states she has been having chills and sweats. She did not want to call her doctor or go in to see a doctor. Nurse provided the nurse care line. And the nurse is going ask her daughter Jackie to check on her. Can call the nurse back if there are more questions or can call pt.    Nurse's [phone]

## 2019-12-16 NOTE — TELEPHONE ENCOUNTER
"Called and spoke with patient. Reports that since she was brought into the ED by the ambulance she has \"been sick ever since\". She was brought into ED on 12/3/19 for hip dislocation.   Reports she has been having a stuffy nose that occasionally drips.  Patient denies CP, SOB, fever, cough, colored phlegm/nasal drainage,   Has been using Mucinex OTC and using a humidifier.   Patient declined to schedule an appointment and does not want to leave her house at this time. She also does not want surgery on her hip.  Advised her to call back if her symptoms persist/worsen. She agreed.    Tatiana Palma RN  "

## 2020-01-09 ENCOUNTER — TRANSFERRED RECORDS (OUTPATIENT)
Dept: HEALTH INFORMATION MANAGEMENT | Facility: CLINIC | Age: 80
End: 2020-01-09

## 2020-01-16 ENCOUNTER — TRANSFERRED RECORDS (OUTPATIENT)
Dept: HEALTH INFORMATION MANAGEMENT | Facility: CLINIC | Age: 80
End: 2020-01-16

## 2020-02-04 ENCOUNTER — TELEPHONE (OUTPATIENT)
Dept: FAMILY MEDICINE | Facility: CLINIC | Age: 80
End: 2020-02-04

## 2020-02-04 NOTE — TELEPHONE ENCOUNTER
Reason for call:  Other   Patient called regarding (reason for call): ARMANDO    Additional comments:   Yuko @  , states that patient is no longer taking her anti depressants and her PHQ9 is a 1. States she told patient to make an appointment with Dr. Ham. Please call to advise.     Phone number to reach patient:  Other phone number:  [phone]    Best Time:  Any     Can we leave a detailed message on this number?  YES

## 2020-02-23 ENCOUNTER — HEALTH MAINTENANCE LETTER (OUTPATIENT)
Age: 80
End: 2020-02-23

## 2020-03-23 ENCOUNTER — TELEPHONE (OUTPATIENT)
Dept: FAMILY MEDICINE | Facility: CLINIC | Age: 80
End: 2020-03-23

## 2020-03-23 NOTE — TELEPHONE ENCOUNTER
Patient called RN hotline requesting a prescription medication.  Pt is requesting a prescription for Prozac 20 mg to take daily for depression and anxiety.   Do you want pt to schedule a telephone visit? Pt states that she can't do an e-visit she does not use mychart.    Pt states that she was started on escitalopram 10 mg last year and was going to increase her dose to 20 mg.  Pt did not like that medication. She was having trouble with hip dislocating and this started around the time that she started the Lexapro, medication was discontinued. Pt states that currently she is not sleeping well and her anxiety has increased so she would like to go back onto a medication.

## 2020-03-23 NOTE — TELEPHONE ENCOUNTER
Patient scheduled for telephone visit tomorrow at 11:00am.     No further action required.    YULIANA Odonnell CMA ([facility])

## 2020-03-24 ENCOUNTER — VIRTUAL VISIT (OUTPATIENT)
Dept: FAMILY MEDICINE | Facility: CLINIC | Age: 80
End: 2020-03-24
Payer: COMMERCIAL

## 2020-03-24 DIAGNOSIS — C50.919 METASTATIC BREAST CANCER: ICD-10-CM

## 2020-03-24 DIAGNOSIS — F41.9 ANXIETY: ICD-10-CM

## 2020-03-24 DIAGNOSIS — F33.0 MAJOR DEPRESSIVE DISORDER, RECURRENT EPISODE, MILD (H): Primary | ICD-10-CM

## 2020-03-24 DIAGNOSIS — C79.51 BONE METASTASIS: ICD-10-CM

## 2020-03-24 DIAGNOSIS — J32.9 SINUSITIS, UNSPECIFIED CHRONICITY, UNSPECIFIED LOCATION: ICD-10-CM

## 2020-03-24 PROCEDURE — 99214 OFFICE O/P EST MOD 30 MIN: CPT | Mod: 95 | Performed by: FAMILY MEDICINE

## 2020-03-24 RX ORDER — FLUOXETINE 10 MG/1
10 TABLET, FILM COATED ORAL DAILY
Qty: 30 TABLET | Refills: 0 | Status: SHIPPED | OUTPATIENT
Start: 2020-03-24 | End: 2020-04-22

## 2020-03-24 RX ORDER — CEPHALEXIN 500 MG/1
500 CAPSULE ORAL 3 TIMES DAILY
Qty: 30 CAPSULE | Refills: 0 | Status: SHIPPED | OUTPATIENT
Start: 2020-03-24 | End: 2020-06-03

## 2020-03-24 ASSESSMENT — ANXIETY QUESTIONNAIRES
IF YOU CHECKED OFF ANY PROBLEMS ON THIS QUESTIONNAIRE, HOW DIFFICULT HAVE THESE PROBLEMS MADE IT FOR YOU TO DO YOUR WORK, TAKE CARE OF THINGS AT HOME, OR GET ALONG WITH OTHER PEOPLE: VERY DIFFICULT
1. FEELING NERVOUS, ANXIOUS, OR ON EDGE: NEARLY EVERY DAY
6. BECOMING EASILY ANNOYED OR IRRITABLE: NEARLY EVERY DAY
3. WORRYING TOO MUCH ABOUT DIFFERENT THINGS: NOT AT ALL
GAD7 TOTAL SCORE: 10
5. BEING SO RESTLESS THAT IT IS HARD TO SIT STILL: NOT AT ALL
2. NOT BEING ABLE TO STOP OR CONTROL WORRYING: NEARLY EVERY DAY
7. FEELING AFRAID AS IF SOMETHING AWFUL MIGHT HAPPEN: NOT AT ALL

## 2020-03-24 ASSESSMENT — PATIENT HEALTH QUESTIONNAIRE - PHQ9
SUM OF ALL RESPONSES TO PHQ QUESTIONS 1-9: 18
5. POOR APPETITE OR OVEREATING: SEVERAL DAYS

## 2020-03-24 NOTE — PROGRESS NOTES
"Nuria Shelley is a 80 year old female who is being evaluated via a billable telephone visit.      The patient has been notified of following:     \"This telephone visit will be conducted via a call between you and your physician/provider. We have found that certain health care needs can be provided without the need for a physical exam.  This service lets us provide the care you need with a short phone conversation.  If a prescription is necessary we can send it directly to your pharmacy.  If lab work is needed we can place an order for that and you can then stop by our lab to have the test done at a later time.    If during the course of the call the physician/provider feels a telephone visit is not appropriate, you will not be charged for this service.\"   11:20 AM-start visit  11:33 AM   AM      Nuria Shelley complains of    Chief Complaint   Patient presents with     Depression       I have reviewed and updated the patient's Past Medical History, Social History, Family History and Medication List.    ALLERGIES  Nickel; Dust mite extract; Erythromycin; No clinical screening - see comments; Penicillins; Sulfa drugs; Macrobid [nitrofuran derivatives]; Nitrofurantoin; and Quinolones    Depression Followup    How are you doing with your depression since your last visit? Worsened     Are you having other symptoms that might be associated with depression? Yes:  appetitie, health concerns    Have you had a significant life event?  Health Concerns     Are you feeling anxious or having panic attacks?   No    Do you have any concerns with your use of alcohol or other drugs? No     PHQ9 reviewed         * Patient states, \"I just need a little help to get by right now.\" Requesting to start  Prozac as she has tried escitalopram in the past and said, \"it has not really helped.\"  Sinus are Plugged up  No cough  COPD stable   Not  Sob  No wheezing  Has Postnasal Drip  Wants keflex as that has worked in the past  Still " going for Chemo for her cancer  Sees Dr Tapia  Is not able to walk due to Hip issues  Is Homebound    Social History     Tobacco Use     Smoking status: Former Smoker     Years: 20.00     Types: Cigarettes     Last attempt to quit: 1980     Years since quittin.2     Smokeless tobacco: Never Used   Substance Use Topics     Alcohol use: Yes     Comment: ocass     Drug use: No     PHQ 2019 2019 3/24/2020   PHQ-9 Total Score 17 8 18   Q9: Thoughts of better off dead/self-harm past 2 weeks Not at all Several days Not at all     ISH-7 SCORE 2019 2019 3/24/2020   Total Score 8 11 10     Last PHQ-9 3/24/2020   1.  Little interest or pleasure in doing things 3   2.  Feeling down, depressed, or hopeless 3   3.  Trouble falling or staying asleep, or sleeping too much 3   4.  Feeling tired or having little energy 3   5.  Poor appetite or overeating 3   6.  Feeling bad about yourself 0   7.  Trouble concentrating 3   8.  Moving slowly or restless 0   Q9: Thoughts of better off dead/self-harm past 2 weeks 0   PHQ-9 Total Score 18   Difficulty at work, home, or with people Extremely dIfficult     In the past two weeks have you had thoughts of suicide or self-harm?  No.    Do you have concerns about your personal safety or the safety of others?   No  Past medical history, family history, medications and social history reviewed today and updated in EPIC.      Suicide Assessment Five-step Evaluation and Treatment (SAFE-T)    Additional provider notes:     Assessment/Plan:  1. Major depressive disorder, recurrent episode, mild (H)/anxiety  Prozac 10 mg  SEE EPIC care orders  The potential side effects of this medication have been discussed with the patient.  Call if any significant problems with these are experienced.  Follow up 1 month    2. Metastatic breast cancer (H)  Sees Oncology    3. Bone metastasis (H)  On chemo q 3 months    4. Sinusitis, unspecified chronicity, unspecified location  Keflex  given  SEE [facility] care orders  The potential side effects of this medication have been discussed with the patient.  Call if any significant problems with these are experienced.  Follow up if not better 1 week      Phone call duration:  12 minutes    Betty Ham MD

## 2020-03-25 ASSESSMENT — ANXIETY QUESTIONNAIRES: GAD7 TOTAL SCORE: 10

## 2020-03-29 ENCOUNTER — TRANSFERRED RECORDS (OUTPATIENT)
Dept: HEALTH INFORMATION MANAGEMENT | Facility: CLINIC | Age: 80
End: 2020-03-29

## 2020-04-17 ENCOUNTER — TELEPHONE (OUTPATIENT)
Dept: FAMILY MEDICINE | Facility: CLINIC | Age: 80
End: 2020-04-17

## 2020-04-17 NOTE — LETTER
April 17, 2020    Nuria Shelley  [address] [place] [id number]      Dear Nuria,     Your clinic record indicates that you are due for an asthma update. We have a survey tool called an PHQ9 (patient health questionnaire) to check on your depression. Please complete the enclosed questionnaire and mail it back to us in the self-addressed stamped envelope.     If you have questions about this letter please contact your provider.     Sincerely,    Your [facility]

## 2020-04-17 NOTE — TELEPHONE ENCOUNTER
Panel Management Review      Patient has the following on her problem list:     Depression / Dysthymia review    Measure:  Needs PHQ-9 score of 4 or less during index window.  Administer PHQ-9 and if score is 5 or more, send encounter to provider for next steps.        PHQ-9 SCORE 8/28/2019 9/17/2019 3/24/2020   PHQ-9 Total Score - - -   PHQ-9 Total Score 17 8 18       If PHQ-9 recheck is 5 or more, route to provider for next steps.    Patient is due for:  PHQ9      Composite cancer screening  Chart review shows that this patient is due/due soon for the following None  Summary:    Patient is due/failing the following:   PHQ9    Action needed:   Patient needs office visit for depressioin.    Type of outreach:    Phone, spoke to patient.  patient declined to set up appointment, but stated she will complete PHQ-9 if sent in the mail    Questions for provider review:    Patient stated she is no longer taking fluoxetine and would like it taken off her medication list.                                                                                                                                    Amanda LARSON CMA ([facility])       Chart routed to Provider .

## 2020-04-21 DIAGNOSIS — F33.0 MAJOR DEPRESSIVE DISORDER, RECURRENT EPISODE, MILD (H): ICD-10-CM

## 2020-04-21 DIAGNOSIS — J32.9 SINUSITIS, UNSPECIFIED CHRONICITY, UNSPECIFIED LOCATION: ICD-10-CM

## 2020-04-21 NOTE — TELEPHONE ENCOUNTER
cephALEXin (KEFLEX) 500 MG capsule       Last Written Prescription Date:  3-  Last Fill Quantity: 30,   # refills: 0  Last Office Visit: 9-  Future Office visit:       Routing refill request to provider for review/approval because:  Drug not on the [facility], [facility] or [facility] refill protocol or controlled substance

## 2020-04-22 RX ORDER — CEPHALEXIN 500 MG/1
500 CAPSULE ORAL 3 TIMES DAILY
Refills: 0
Start: 2020-04-22

## 2020-04-22 RX ORDER — FLUOXETINE 10 MG/1
10 TABLET, FILM COATED ORAL DAILY
Qty: 30 TABLET | Refills: 0 | Status: SHIPPED | OUTPATIENT
Start: 2020-04-22 | End: 2021-01-19

## 2020-04-22 NOTE — TELEPHONE ENCOUNTER
Spoke to patient and she did request refill.  Amanda LARSON CMA ([facility])

## 2020-04-22 NOTE — TELEPHONE ENCOUNTER
Team Red- Please ask pharmacy if patient requested the Keflex or if the request was automated. It was prescribed for acute issue (sinusitis)    Routing refill request to provider for review/approval because:  PHQ-9 score was 18 on 03/24/2020, pt was to f/u in 1 month. (Fluoxetine)

## 2020-04-22 NOTE — TELEPHONE ENCOUNTER
Called patient regarding request for Keflex. Notified her that since last Rx and visit was over a month ago, provider may require a visit for re-assessment. Pt is requesting refill on the Keflex for symptoms listed below:  Pain in her face and in ear, states that left cheek and right ear hurt. Dizziness that is affecting her balance. Asked patient if she has a fever. She is not sure. Temp usually 97.0. 2-3 nights ago she had a fever.

## 2020-04-23 NOTE — TELEPHONE ENCOUNTER
Betty Ham MD   Team Red 19 hours ago (12:02 PM)      Needs appointment          Should this be a in office, video visit or phone visit?    Mary Anne Galarza RN

## 2020-04-23 NOTE — TELEPHONE ENCOUNTER
Spoke to patient and told her about refill. Told her OV is needed for medication refill on keflex  Jessica Jay CMA on 4/23/2020 at 7:58 AM

## 2020-05-12 DIAGNOSIS — R09.81 CHRONIC NASAL CONGESTION: ICD-10-CM

## 2020-05-12 RX ORDER — FLUTICASONE PROPIONATE 50 MCG
SPRAY, SUSPENSION (ML) NASAL
Qty: 16 G | Refills: 3 | Status: SHIPPED | OUTPATIENT
Start: 2020-05-12 | End: 2020-08-20

## 2020-05-12 NOTE — TELEPHONE ENCOUNTER
Patient is wondering if she can pick the medication today, her allergies are really bad and she's afraid she'll get dizzy.    Thank you   Trina Obando. Pharmacy Technician   [facility]

## 2020-06-03 ENCOUNTER — NURSE TRIAGE (OUTPATIENT)
Dept: FAMILY MEDICINE | Facility: CLINIC | Age: 80
End: 2020-06-03

## 2020-06-03 DIAGNOSIS — R42 DIZZINESS: Primary | ICD-10-CM

## 2020-06-03 RX ORDER — MECLIZINE HYDROCHLORIDE 25 MG/1
25 TABLET ORAL 3 TIMES DAILY PRN
Qty: 30 TABLET | Refills: 0 | Status: SHIPPED | OUTPATIENT
Start: 2020-06-03 | End: 2021-01-19

## 2020-06-03 NOTE — TELEPHONE ENCOUNTER
Signed Prescriptions:                        Disp   Refills    meclizine (ANTIVERT) 25 MG tablet          30 tab*0        Sig: Take 1 tablet (25 mg) by mouth 3 times daily as           needed for dizziness . No further refills until           follow-up appointment  Authorizing Provider: VANESA PEÑALOZA

## 2020-06-03 NOTE — TELEPHONE ENCOUNTER
Pt received an Rx from 05/01/2016 for meclizine (ANTIVERT) 25 MG tablet 1 tab every 6 hours as needed for dizziness.  Pt states that 2 years ago she fell and broke her hip since then has struggled with chronic dizziness. States that allergy season affects dizziness and she has has trees in her back yard, takes allergy meds. Uses a cane. States that the dizziness has been ongoing for more than 4 weeks and is chronic.  Recommended visit for medication. Pt states that she cannot come into the clinic. Recommended telephone visit. Pt declined stating that she would just like to get an Rx and her provider is aware of her symptoms.    Additional Information    Negative: Shock suspected (e.g., cold/pale/clammy skin, too weak to stand, low BP, rapid pulse)    Negative: Difficult to awaken or acting confused (e.g., disoriented, slurred speech)    Negative: Fainted, and still feels dizzy afterwards    Negative: Severe difficulty breathing (e.g., struggling for each breath, speaks in single words)    Negative: Overdose (accidental or intentional) of medications    Negative: New neurologic deficit that is present now: * Weakness of the face, arm, or leg on one side of the body * Numbness of the face, arm, or leg on one side of the body * Loss of speech or garbled speech    Negative: Heart beating < 50 beats per minute OR > 140 beats per minute    Negative: Sounds like a life-threatening emergency to the triager    Negative: Chest pain    Negative: Rectal bleeding, bloody stool, or tarry-black stool    Negative: Vomiting is the main symptom    Negative: Diarrhea is the main symptom    Negative: Headache is the main symptom    Negative: Heat exhaustion suspected (i.e., dehydration from heat exposure)    Negative: Patient states that he/she is having an anxiety/panic attack    Negative: SEVERE dizziness (e.g., unable to stand, requires support to walk, feels like passing out now)    Negative: SEVERE headache or neck pain     Negative: Spinning or tilting sensation (vertigo) present now and one or more stroke risk factors (i.e., hypertension, diabetes, prior stroke/TIA, heart attack, age over 60) (Exception: prior physician evaluation for this AND no different/worse than usual)    Negative: Loss of vision or double vision    Negative: Extra heart beats OR irregular heart beating (i.e., 'palpitations')    Negative: Difficulty breathing    Negative: Drinking very little and has signs of dehydration (e.g., no urine > 12 hours, very dry mouth, very lightheaded)    Negative: Follows bleeding (e.g., stomach, rectum, vagina) (Exception: became dizzy from sight of small amount blood)    Negative: Patient sounds very sick or weak to the triager    Negative: Lightheadedness (dizziness) present now, after 2 hours of rest and fluids    Negative: Spinning or tilting sensation (vertigo) present now    Negative: Fever > 103 F (39.4 C)    Negative: Fever > 100.0 F (37.8 C) and has diabetes mellitus or a weak immune system (e.g., HIV positive, cancer chemotherapy, organ transplant, splenectomy, chronic steroids)    Negative: Taking a medicine that could cause dizziness (e.g., blood pressure medications, diuretics)    Negative: Diabetes    Negative: Vomiting occurs with dizziness    Negative: Patient wants to be seen    Dizziness not present now, but is a chronic symptom (recurrent or ongoing AND lasting > 4 weeks)    Protocols used: DIZZINESS-A-OH

## 2020-06-03 NOTE — TELEPHONE ENCOUNTER
Nuria is wondering if you can send us a script for meclizine.An emergency doctor prescribed it, she said it helps her with dizziness.    Thank you   Tirna Obando. Pharmacy Technician   [facility]

## 2020-06-09 ENCOUNTER — TRANSFERRED RECORDS (OUTPATIENT)
Dept: HEALTH INFORMATION MANAGEMENT | Facility: CLINIC | Age: 80
End: 2020-06-09

## 2020-07-28 DIAGNOSIS — F33.0 MAJOR DEPRESSIVE DISORDER, RECURRENT EPISODE, MILD (H): Primary | ICD-10-CM

## 2020-07-28 RX ORDER — FLUOXETINE 10 MG/1
CAPSULE ORAL
Qty: 90 CAPSULE | Refills: 0 | Status: SHIPPED | OUTPATIENT
Start: 2020-07-28 | End: 2020-11-17

## 2020-07-31 DIAGNOSIS — F33.0 MAJOR DEPRESSIVE DISORDER, RECURRENT EPISODE, MILD (H): ICD-10-CM

## 2020-07-31 NOTE — TELEPHONE ENCOUNTER
Duplicate     Disp  Refills  Start  End  JA    FLUoxetine (PROZAC) 10 MG capsule  90 capsule  0  7/28/2020   No    Sig: TAKE ONE CAPSULE BY MOUTH ONCE DAILY    Sent to pharmacy as: FLUoxetine HCl 10 MG Oral Capsule (PROzac)    Class: E-Prescribe    Order: [id number]    E-Prescribing Status: Receipt confirmed by pharmacy (7/28/2020  3:56 PM CDT)

## 2020-08-02 RX ORDER — FLUOXETINE 10 MG/1
CAPSULE ORAL
Qty: 30 CAPSULE | Refills: 0 | OUTPATIENT
Start: 2020-08-02

## 2020-08-16 ENCOUNTER — OFFICE VISIT (OUTPATIENT)
Dept: URGENT CARE | Facility: URGENT CARE | Age: 80
End: 2020-08-16
Payer: COMMERCIAL

## 2020-08-16 VITALS
HEIGHT: 64 IN | WEIGHT: 166 LBS | BODY MASS INDEX: 28.34 KG/M2 | HEART RATE: 105 BPM | SYSTOLIC BLOOD PRESSURE: 130 MMHG | RESPIRATION RATE: 18 BRPM | TEMPERATURE: 98.2 F | DIASTOLIC BLOOD PRESSURE: 70 MMHG | OXYGEN SATURATION: 97 %

## 2020-08-16 DIAGNOSIS — R07.0 THROAT PAIN: Primary | ICD-10-CM

## 2020-08-16 DIAGNOSIS — J01.90 ACUTE SINUSITIS WITH SYMPTOMS > 10 DAYS: ICD-10-CM

## 2020-08-16 LAB
DEPRECATED S PYO AG THROAT QL EIA: NEGATIVE
SPECIMEN SOURCE: NORMAL
SPECIMEN SOURCE: NORMAL
STREP GROUP A PCR: NOT DETECTED

## 2020-08-16 PROCEDURE — 99214 OFFICE O/P EST MOD 30 MIN: CPT | Performed by: FAMILY MEDICINE

## 2020-08-16 PROCEDURE — 87651 STREP A DNA AMP PROBE: CPT | Performed by: FAMILY MEDICINE

## 2020-08-16 PROCEDURE — 40001204 ZZHCL STATISTIC STREP A RAPID: Performed by: FAMILY MEDICINE

## 2020-08-16 RX ORDER — CEPHALEXIN 500 MG/1
500 CAPSULE ORAL 2 TIMES DAILY
Qty: 20 CAPSULE | Refills: 0 | Status: SHIPPED | OUTPATIENT
Start: 2020-08-16 | End: 2020-08-26

## 2020-08-16 ASSESSMENT — MIFFLIN-ST. JEOR: SCORE: 1197.97

## 2020-08-16 NOTE — PROGRESS NOTES
"Chief complaint: sore throat    Patient has had some nasal congestion and postnasal drainage 2 weeks   Stopped dripping however now having some sinus congestion and pressure for the past week    No recent ill contacts     Colds, sinus congestion, facial pain  Cough No  Fever feeling feverish   Progressively getting worse: YES  Thought was getting better then started getting worse: YES  Getting better:  No  Exposure to pertussis or pertussis like symptoms: No    Problem list and histories reviewed & adjusted, as indicated.  Additional history: as documented    Problem list, Medication list, Allergies, and Medical/Social/Surgical histories [facility] and updated as appropriate.    ROS:  Constitutional, HEENT, cardiovascular, pulmonary, gi and gu systems are negative, except as otherwise noted.    OBJECTIVE:                                                    /70   Pulse 105   Temp 98.2  F (36.8  C) (Oral)   Resp 18   Ht 1.626 m (5' 4\")   Wt 75.3 kg (166 lb)   SpO2 97%   Breastfeeding No   BMI 28.49 kg/m    Body mass index is 28.49 kg/m .  GENERAL: healthy, alert and no distress  EYES: Eyes grossly normal to inspection, PERRL and conjunctivae and sclerae normal  HENT: ear canals and TM's normal, nose and mouth without ulcers or lesions  Sinuses: turbinates erythematous   NECK: no adenopathy, no asymmetry, masses, or scars and thyroid normal to palpation  RESP: lungs clear to auscultation - no rales, rhonchi or wheezes   CV: regular rate and rhythm, normal S1 S2, no S3 or S4, no murmur, click or rub, no peripheral edema and peripheral pulses strong  MS: no gross musculoskeletal defects noted, no edema  SKIN: no suspicious lesions or rashes  NEURO: Normal strength and tone, mentation intact and speech normal  PSYCH: mentation appears normal, affect normal/bright    Diagnostic Test Results:  Results for orders placed or performed in visit on 08/16/20 (from the past 24 hour(s))   Streptococcus A Rapid " Scr w Reflx to PCR    Specimen: Throat   Result Value Ref Range    Strep Specimen Description Throat     Streptococcus Group A Rapid Screen Negative NEG^Negative        ASSESSMENT/PLAN:                                                        ICD-10-CM    1. Throat pain  R07.0 Streptococcus A Rapid Scr w Reflx to PCR     Group A Streptococcus PCR Throat Swab     Symptomatic COVID-19 Virus (Coronavirus) by PCR   2. Acute sinusitis with symptoms > 10 days  J01.90 cephALEXin (KEFLEX) 500 MG capsule     Prescribed with keflex  Warned about possible cross-reactivity/allergy of cephalosporins with amoxicillin. Patient did not have any life-threatening reaction to amoxicillin and will be monitoring for any reaction.  Has tolerated cephalosporins in the past.   Recommend follow up with primary care provider if no relief , sooner if worse  Adverse reactions of medications discussed.  Over the counter medications discussed.   Aware to come back in if with worsening symptoms or if no relief despite treatment plan  Patient voiced understanding and had no further questions.     Patient advised that he/she also has symptoms consistent with covid19  covid19 precautions advised  Patient referred for testing - patient states she would like to wait 2-3 days for antibiotic to kick in.   Patient aware if she doesn't go for testing I would recommend self-isolation for 10 days from when symptoms started and until 24 hours of no symptoms   Patient has no signs or symptoms of pneumonia  Lungs are clear  Vital signs stable.  Alarm signs or symptoms discussed, if present recommend go to ER       MD Harmony Cope MD  [facility]

## 2020-08-16 NOTE — PATIENT INSTRUCTIONS
"Discharge Instructions for COVID-19 Patients  You have--or may have--COVID-19. Please follow the instructions listed below.   If you have a weakened immune system, discuss with your doctor any other actions you need to take.  How can I protect others?  If you have symptoms (fever, cough, body aches or trouble breathing):    Stay home and away from others (self-isolate) until:  ? At least 10 days have passed since your symptoms started. And   ? You've had no fever--and no medicine that reduces fever--for 3 full days (72 hours). And   ? Your other symptoms have resolved (gotten better).  If you don't show symptoms, but testing showed that you have COVID-19:    Stay home and away from others (self-isolate) until at least 10 days have passed since the date of your first positive COVID-19 test.  During this time    Stay in your own room, even for meals. Use your own bathroom if you can.    Stay away from others in your home. No hugging, kissing or shaking hands. No visitors.    Don't go to work, school or anywhere else.    Clean \"high touch\" surfaces often (doorknobs, counters, handles). Use household cleaning spray or wipes. You'll find a full list of  on the EPA website: www.epa.gov/pesticide-registration/list-n-disinfectants-use-against-sars-cov-2.    Cover your mouth and nose with a mask, tissue or wash cloth to avoid spreading germs.    Wash your hands and face often. Use soap and water.    Caregivers in these groups are at risk for severe illness due to COVID-19:  ? People 65 years and older  ? People who live in a nursing home or long-term care facility  ? People with chronic disease (lung, heart, cancer, diabetes, kidney, liver, immunologic)  ? People who have a weakened immune system, including those who:    Are in cancer treatment    Take medicine that weakens the immune system, such as corticosteroids    Had a bone marrow or organ transplant    Have an immune deficiency    Have poorly controlled HIV or " AIDS    Are obese (body mass index of 40 or higher)    Smoke regularly    Caregivers should wear gloves while washing dishes, handling laundry and cleaning bedrooms and bathrooms.    Use caution when washing and drying laundry: Don't shake dirty laundry and use the warmest water setting that you can.    For more tips on managing your health at home, go to www.cdc.gov/coronavirus/2019-ncov/downloads/10Things.pdf.  How can I take care of myself at home?  1. Get lots of rest. Drink extra fluids (unless a doctor has told you not to).  2. Take Tylenol (acetaminophen) for fever or pain. If you have liver or kidney problems, ask your family doctor if it's okay to take Tylenol.     Adults can take either:  ? 650 mg (two 325 mg pills) every 4 to 6 hours, or   ? 1,000 mg (two 500 mg pills) every 8 hours as needed.  ? Note: Don't take more than 3,000 mg in one day. Acetaminophen is found in many medicines (both prescribed and over-the-counter medicines). Read all labels to be sure you don't take too much.   For children, check the Tylenol bottle for the right dose. The dose is based on the child's age or weight.  3. If you have other health problems (like cancer, heart failure, an organ transplant or severe kidney disease): Call your specialty clinic if you don't feel better in the next 2 days.  4. Know when to call 911. Emergency warning signs include:  ? Trouble breathing or shortness of breath  ? Pain or pressure in the chest that doesn't go away  ? Feeling confused like you haven't felt before, or not being able to wake up  ? Bluish-colored lips or face  5. Your doctor may have prescribed a blood thinner medicine. Follow their instructions.  Where can I get more information?     [facility] - About COVID-19:   [email or web address]    CDC - What to Do If You're Sick: www.cdc.gov/coronavirus/2019-ncov/about/steps-when-sick.html    CDC - Ending Home Isolation:  www.cdc.gov/coronavirus/2019-ncov/hcp/disposition-in-home-patients.html    CDC - Caring for Someone: www.cdc.gov/coronavirus/2019-ncov/if-you-are-sick/care-for-someone.html    [facility] - Interim Guidance for Hospital Discharge to Home: www.[facility].[place]/diseases/coronavirus/hcp/hospdischarge.pdf    [facility] clinical trials (COVID-19 research studies): clinicalaffairs.[facility]-clinical-trials    Below are the COVID-19 hotlines at the [place] Department of Health ([facility]). Interpreters are available.  ? For health questions: Call 476-903-0753 or 1-815.450.5168 (7 a.m. to 7 p.m.)  ? For questions about schools and childcare: Call 421-510-2769 or 1-833.570.8850 (7 a.m. to 7 p.m.)    For informational purposes only. Not to replace the advice of your health care provider. Clinically reviewed by the Infection Prevention Team.Copyright   2020 [facility]. All rights reserved. [employer] [id number] - 06/20.

## 2020-08-20 DIAGNOSIS — R09.81 CHRONIC NASAL CONGESTION: ICD-10-CM

## 2020-08-20 RX ORDER — FLUTICASONE PROPIONATE 50 MCG
SPRAY, SUSPENSION (ML) NASAL
Qty: 16 G | Refills: 5 | Status: SHIPPED | OUTPATIENT
Start: 2020-08-20 | End: 2021-02-18

## 2020-08-20 NOTE — TELEPHONE ENCOUNTER
Prescription approved per [facility] Refill Protocol.    Malika Reeves RN

## 2020-09-01 ENCOUNTER — TRANSFERRED RECORDS (OUTPATIENT)
Dept: HEALTH INFORMATION MANAGEMENT | Facility: CLINIC | Age: 80
End: 2020-09-01

## 2020-09-02 DIAGNOSIS — J32.9 SINUSITIS, UNSPECIFIED CHRONICITY, UNSPECIFIED LOCATION: ICD-10-CM

## 2020-09-02 DIAGNOSIS — J01.90 ACUTE SINUSITIS WITH SYMPTOMS > 10 DAYS: ICD-10-CM

## 2020-09-02 RX ORDER — CEPHALEXIN 500 MG/1
CAPSULE ORAL
Qty: 30 CAPSULE | Refills: 0 | OUTPATIENT
Start: 2020-09-02

## 2020-09-02 NOTE — TELEPHONE ENCOUNTER
Spoke with pharmacist Jerri. She states patient called and cancelled appointment for today because Dr. Ham should have all her information regarding allergies and recurrent sinus infections and be able to prescribe an antibiotic.   Requesting abx for sinus infection.     Tatiana Palma RN

## 2020-09-02 NOTE — TELEPHONE ENCOUNTER
Patient was scheduled for telephone visit with Nabila Amezquita PA-C today. Patient was confused why appointment was scheduled. Informed patient that appointment is needed for cephalexin prescriptions to be refills. Patient stated that she is unable to afford a virtual visit and office visit right now. She just want cephalexin prescriptions to be refilled.     Lolis Jay CMA

## 2020-09-02 NOTE — TELEPHONE ENCOUNTER
Called and spoke with patient. Reports she has been having bad allergies which develops into a sinus infection. States her sinuses fill up and won't drip. This causes her to get dizzy.  Scheduled patient for telephone visit today.     Tatiana Palma RN

## 2020-09-03 RX ORDER — CEPHALEXIN 500 MG/1
CAPSULE ORAL
Qty: 20 CAPSULE | Refills: 0 | OUTPATIENT
Start: 2020-09-03

## 2020-09-04 NOTE — TELEPHONE ENCOUNTER
Called and spoke with the patient. She is feeling better. She would like an new order for a dexa scan. She would like this to be faxed to [facility].     Fax # [phone]    Please place new order for DEXA HIP/PELVIS/SPINE     Charlee Bull

## 2020-09-15 ENCOUNTER — OFFICE VISIT (OUTPATIENT)
Dept: URGENT CARE | Facility: URGENT CARE | Age: 80
End: 2020-09-15
Payer: COMMERCIAL

## 2020-09-15 VITALS
WEIGHT: 150 LBS | DIASTOLIC BLOOD PRESSURE: 80 MMHG | TEMPERATURE: 98.3 F | RESPIRATION RATE: 18 BRPM | OXYGEN SATURATION: 97 % | HEART RATE: 107 BPM | BODY MASS INDEX: 25.75 KG/M2 | SYSTOLIC BLOOD PRESSURE: 136 MMHG

## 2020-09-15 DIAGNOSIS — J32.4 CHRONIC PANSINUSITIS: Primary | ICD-10-CM

## 2020-09-15 PROCEDURE — 99213 OFFICE O/P EST LOW 20 MIN: CPT | Performed by: PHYSICIAN ASSISTANT

## 2020-09-15 RX ORDER — CEPHALEXIN 500 MG/1
500 CAPSULE ORAL 2 TIMES DAILY
Qty: 15 CAPSULE | Refills: 0 | Status: SHIPPED | OUTPATIENT
Start: 2020-09-15 | End: 2020-10-07

## 2020-09-15 ASSESSMENT — ENCOUNTER SYMPTOMS
MUSCULOSKELETAL NEGATIVE: 1
HEADACHES: 1
NUMBNESS: 0
SINUS PRESSURE: 1
FACIAL SWELLING: 0
SPEECH DIFFICULTY: 0
RESPIRATORY NEGATIVE: 1
APPETITE CHANGE: 0
SINUS PAIN: 1
PSYCHIATRIC NEGATIVE: 1
DIZZINESS: 0
DIAPHORESIS: 0
ACTIVITY CHANGE: 0
TREMORS: 0
WEAKNESS: 0
UNEXPECTED WEIGHT CHANGE: 0
FACIAL ASYMMETRY: 0
LIGHT-HEADEDNESS: 0
CHILLS: 0
SEIZURES: 0
RHINORRHEA: 0
EYES NEGATIVE: 1
FEVER: 0
GASTROINTESTINAL NEGATIVE: 1
FATIGUE: 1
CARDIOVASCULAR NEGATIVE: 1

## 2020-09-15 NOTE — PROGRESS NOTES
SUBJECTIVE:   Nuria Shelley is a 80 year old female presenting with a chief complaint of   Chief Complaint   Patient presents with     Sinus Problem     possible sinus infection per pt for a few weeks now on and off, was given antibiotics for 10 days and finished that but still have sx and congested       She is an established patient of [facility].    URI Adult    Onset of symptoms was 3 week(s) ago.  Course of illness is waxing and waning.    Severity moderately severe  Current and Associated symptoms: stuffy nose, ear pain bilateral, facial pain/pressure, headache and fatigue  Treatment measures tried include Tylenol/Ibuprofen, Decongestants, flonase, Fluids, Rest and keflex (which greatly improved symptoms while she was taking it).  Predisposing factors include HX of chronic sinusitis.      Review of Systems   Constitutional: Positive for fatigue. Negative for activity change, appetite change, chills, diaphoresis, fever and unexpected weight change.   HENT: Positive for congestion, ear pain, sinus pressure and sinus pain. Negative for dental problem, drooling, ear discharge, facial swelling, hearing loss, mouth sores, nosebleeds, postnasal drip and rhinorrhea.    Eyes: Negative.    Respiratory: Negative.    Cardiovascular: Negative.    Gastrointestinal: Negative.    Genitourinary: Negative.    Musculoskeletal: Negative.    Neurological: Positive for headaches. Negative for dizziness, tremors, seizures, syncope, facial asymmetry, speech difficulty, weakness, light-headedness and numbness.   Psychiatric/Behavioral: Negative.        Past Medical History:   Diagnosis Date     Allergic rhinitis      Back strain      Bone metastases (H)      Breast cancer (H) 2008    Right     Constipation      COPD (chronic obstructive pulmonary disease) (H)      Depression      Diverticulosis 5/2/2013     GERD (gastroesophageal reflux disease)      IBS (irritable bowel syndrome)      Malignant pleural effusion      Obesity       Urinary incontinence 11/16/2010     Urinary stress incontinence      Wrist tendonitis     Left     Family History   Problem Relation Age of Onset     Heart Disease Mother      Diabetes Mother         d 70 from CAD     Cerebrovascular Disease Father         d age 82     Heart Disease Maternal Grandmother      Diabetes Brother      Heart Disease Brother         MI age 60     Heart Disease Sister         d age 52 from MI     Diabetes Sister      Breast Cancer Daughter         age 50     Heart Disease Brother      Diabetes Brother      Alzheimer Disease Brother      Cancer Brother         pancreatic ca     Anesthesia Reaction No family hx of      Current Outpatient Medications   Medication Sig Dispense Refill     anastrozole (ARIMIDEX) 1 MG tablet Take 1 mg by mouth daily       aspirin 325 MG EC tablet Take 325 mg by mouth daily       Calcium-Vitamin D (CALCIUM + D PO) Take  by mouth. 1200 mg calcium daily with vitamin D 1000 units daily       diclofenac (VOLTAREN) 75 MG EC tablet Take 1 tablet (75 mg) by mouth 2 times daily 180 tablet 0     fexofenadine (ALLEGRA) 60 MG tablet Take 1 tablet (60 mg) by mouth 2 times daily 180 tablet 0     FLUoxetine (PROZAC) 10 MG capsule TAKE ONE CAPSULE BY MOUTH ONCE DAILY 90 capsule 0     FLUoxetine (PROZAC) 10 MG tablet Take 1 tablet (10 mg) by mouth daily 30 tablet 0     fluticasone (FLONASE) 50 MCG/ACT nasal spray USE ONE TO TWO SPRAYS INTO EACH NOSTRIL ONCE DAILY 16 g 5     fluticasone-salmeterol (ADVAIR DISKUS) 100-50 MCG/DOSE diskus inhaler Inhale 1 puff into the lungs two times daily       HYDROcodone-acetaminophen (NORCO) 5-325 MG tablet Take 1 tablet by mouth daily 25 tablet 0     meclizine (ANTIVERT) 25 MG tablet Take 1 tablet (25 mg) by mouth 3 times daily as needed for dizziness . No further refills until follow-up appointment 30 tablet 0     minocycline (MINOCIN/DYNACIN) 100 MG capsule TK 1 C PO BID  0     Multiple Vitamin (MULTI-VITAMIN PO) Take  by mouth.       nystatin  (MYCOSTATIN) 593824 UNIT/GM external powder Apply topically 3 times daily as needed 60 g 1     olopatadine (PATANOL) 0.1 % ophthalmic solution Place 1 drop into both eyes 2 times daily. 1 mL 3     polyethylene glycol (MIRALAX/GLYCOLAX) Packet Take 1 packet by mouth daily       rifampin (RIFADIN) 300 MG capsule TK 1 C PO BID BEFORE MEALS FOR 90 DAYS  0     senna-docusate (SENOKOT-S/PERICOLACE) 8.6-50 MG tablet Take 1 tablet by mouth two times daily 60 tablet 11     simvastatin (ZOCOR) 20 MG tablet TAKE ONE TABLET BY MOUTH EVERY DAY AT BEDTIME 90 tablet 3     tiotropium (SPIRIVA HANDIHALER) 18 MCG capsule Inhale contents of one capsule daily.       VENTOLIN  (90 BASE) MCG/ACT Inhaler INHALE 2 PUFFS INTO THE LUNGS EVERY 6 HOURS AS NEEDED FOR SHORTNESS OF BREATH/DYSPNEA 18 g 12     Social History     Tobacco Use     Smoking status: Former Smoker     Years: 20.00     Types: Cigarettes     Last attempt to quit: 1980     Years since quittin.7     Smokeless tobacco: Never Used   Substance Use Topics     Alcohol use: Yes     Comment: ocass       OBJECTIVE  /80   Pulse 107   Temp 98.3  F (36.8  C) (Oral)   Resp 18   Wt 68 kg (150 lb)   SpO2 97%   BMI 25.75 kg/m      Physical Exam  Constitutional:       Appearance: Normal appearance. She is normal weight.   HENT:      Head: Normocephalic and atraumatic.      Right Ear: Tympanic membrane, ear canal and external ear normal. There is no impacted cerumen.      Left Ear: Tympanic membrane, ear canal and external ear normal. There is no impacted cerumen.      Nose: Congestion present. No septal deviation, nasal tenderness or mucosal edema.      Right Nostril: No foreign body or epistaxis.      Left Nostril: No foreign body or epistaxis.      Right Sinus: Maxillary sinus tenderness and frontal sinus tenderness present.      Left Sinus: Maxillary sinus tenderness and frontal sinus tenderness present.      Comments: Maxillary and frontal sinus tenderness.      Mouth/Throat:      Mouth: Mucous membranes are moist.      Pharynx: Oropharynx is clear. No oropharyngeal exudate or posterior oropharyngeal erythema.   Cardiovascular:      Rate and Rhythm: Normal rate and regular rhythm.      Pulses: Normal pulses.      Heart sounds: Normal heart sounds. No murmur. No friction rub. No gallop.    Pulmonary:      Effort: Pulmonary effort is normal. No respiratory distress.      Breath sounds: Normal breath sounds.   Abdominal:      General: Abdomen is flat. Bowel sounds are normal.      Palpations: Abdomen is soft.   Neurological:      General: No focal deficit present.      Mental Status: She is alert and oriented to person, place, and time. Mental status is at baseline.      Gait: Gait normal.   Psychiatric:         Mood and Affect: Mood normal.         Behavior: Behavior normal.         Thought Content: Thought content normal.         Judgment: Judgment normal.         ASSESSMENT/PLAN:    (J32.4) Chronic pansinusitis  (primary encounter diagnosis)  Plan: cephALEXin (KEFLEX) 500 MG capsule      Age 12 months or more  Okay to use [employer]'s   Okay to use Rx Children Tylenol if prescribed (Dose based on weight)    Age 2-12:   Okay to use Children Motrin or Tylenol over the counter.    Adults:  Okay to take acetaminophen 500 mg- 2 tabs (Total of 1000 mg) every 8 hrs   Okay to take ibuprofen 200 mg- 3 tabs (Total of 600 mg) every 6 hours        Okay to use Neti pot for sinus lavage up to three times daily for congestion and sinus pressure if present. Daily hot shower can be beneficial for congestion and body aches. Okay to use bedroom vaporizer or humidifier if symptoms are worse at night. Nightly Vicks Vapor rub and 5-10 mg of Melatonin okay to use for sleep.     Over the counter cough medication and decongestants okay if not prescribed by me during this visit. For homeopathic alternatives to cough syrup and decongestant, feel free to try Elderberry extract.    Okay to use salt water  gargles, warm tea (or warm water with lemon and honey), and lozenges for any throat discomfort. Chloraseptic spray is also highly encourages for throat pain/irritation.     Patient will need to get plenty of rest and drink at least 1.5-2 liters of fluids daily for adults and 1-1.5 liters for children. If vomiting and not tolerating liquids for more than 24 hrs, please go to your nearest emergency department for IV fluids and further treatment.     Patient is not contagious after 1 week from start of symptoms. If possible, wear mask for first 7 days. Wash hands regularly and vigorously for 30 seconds often.       Patient was advised to return to clinic if symptoms do not improve in the amount of time specified in the AVS or if symptoms worsen. Patient educated on red flag symptoms and asked to go directly to the ED if symptoms present themselves.     Lewis Rutherford PA-C on 9/15/2020 at 6:26 PM

## 2020-09-15 NOTE — PATIENT INSTRUCTIONS
Patient Education     Self-Care for Sinusitis     Drinking plenty of water can help sinuses drain.   Sinusitis can often be managed with self-care. Self-care can keep sinuses moist and make you feel more comfortable. Remember to follow your doctor's instructions closely. This can make a big difference in getting your sinus problem under control.  Drink fluids  Drinking extra fluids helps thin your mucus. This lets it drain from your sinuses more easily. Have a glass of water every hour or two. A humidifier helps in much the same way. Fluids can also offset the drying effects of certain medicines. If you use a humidifier, follow the product maker's instructions on how to use it. Clean it on a regular schedule.  Use saltwater rinses  Rinses help keep your sinuses and nose moist. Mix a teaspoon of salt in 8 ounces of fresh, warm water. Use a bulb syringe to gently squirt the water into your nose a few times a day. You can also buy ready-made saline nasal sprays.  Apply hot or cold packs  Applying heat to the area surrounding your sinuses may make you feel more comfortable. Use a hot water bottle or a hand towel dipped in hot water. Some people also find ice packs effective for relieving pain.  Medicines  Your doctor may prescribe medications to help treat your sinusitis. If you have an infection, antibiotics can help clear it up. If you are prescribed antibiotics, take all pills on schedule until they are gone, even if you feel better. Decongestants help relieve swelling. Use decongestant sprays for short periods only under the direction of your doctor. If you have allergies, your doctor may prescribe medications to help relieve them.   Date Last Reviewed: 10/1/2016    9819-5374 [employer]. [address] [place] [id number]. All rights reserved. This information is not intended as a substitute for professional medical care. Always follow your healthcare professional's instructions.

## 2020-10-07 ENCOUNTER — VIRTUAL VISIT (OUTPATIENT)
Dept: FAMILY MEDICINE | Facility: CLINIC | Age: 80
End: 2020-10-07
Payer: COMMERCIAL

## 2020-10-07 DIAGNOSIS — R09.81 CHRONIC NASAL CONGESTION: Primary | ICD-10-CM

## 2020-10-07 PROCEDURE — 99213 OFFICE O/P EST LOW 20 MIN: CPT | Mod: TEL | Performed by: NURSE PRACTITIONER

## 2020-10-07 RX ORDER — FEXOFENADINE HCL 60 MG/1
60 TABLET, FILM COATED ORAL 2 TIMES DAILY
Qty: 180 TABLET | Refills: 0 | Status: SHIPPED | OUTPATIENT
Start: 2020-10-07 | End: 2020-11-17

## 2020-10-07 RX ORDER — PREDNISONE 20 MG/1
20 TABLET ORAL DAILY
Qty: 5 TABLET | Refills: 0 | Status: SHIPPED | OUTPATIENT
Start: 2020-10-07 | End: 2020-10-12

## 2020-10-07 NOTE — PROGRESS NOTES
"Nuria Shelley is a 80 year old female who is being evaluated via a billable telephone visit.      The patient has been notified of following:     \"This telephone visit will be conducted via a call between you and your physician/provider. We have found that certain health care needs can be provided without the need for a physical exam.  This service lets us provide the care you need with a short phone conversation.  If a prescription is necessary we can send it directly to your pharmacy.  If lab work is needed we can place an order for that and you can then stop by our lab to have the test done at a later time.    Telephone visits are billed at different rates depending on your insurance coverage. During this emergency period, for some insurers they may be billed the same as an in-person visit.  Please reach out to your insurance provider with any questions.    If during the course of the call the physician/provider feels a telephone visit is not appropriate, you will not be charged for this service.\"    Patient has given verbal consent for Telephone visit?  Yes    What phone number would you like to be contacted at? [phone]    How would you like to obtain your AVS? Mail a copy    Subjective     Nuria Shelley is a 80 year old female who presents via phone visit today for the following health issues:    HPI     Acute Illness  Acute illness concerns:   Onset/Duration: mid July  Symptoms:  Fever: YES- slightly Tmax 98.1  Chills/Sweats: no  Headache (location?): no  Sinus Pressure: YES  Conjunctivitis:  no  Ear Pain: YES: bilateral  Rhinorrhea: no  Congestion: no  Sore Throat: no  Cough: no  Wheeze: no  Decreased Appetite: no  Nausea: no  Vomiting: no  Diarrhea: no  Dysuria/Freq.: no  Dysuria or Hematuria: no  Fatigue/Achiness: no  Sick/Strep Exposure: no  Therapies tried and outcome: Keflex which helped, over the counter decongestant (Sudafed) and Lysine. Flonase.    Symptoms began in July with allergies. " Allergies always flare in fall and spring. Rhinorrhea is clear, post-nasal drip. No cough. Sore inside nose.  Not taking any antihistamines currently.  Reviewed prior sinus CT and ENT consult.      Review of Systems   Constitutional, HEENT, cardiovascular, pulmonary, gi and gu systems are negative, except as otherwise noted.       Objective          Vitals:  No vitals were obtained today due to virtual visit.    healthy, alert and no distress  PSYCH: Alert and oriented times 3; coherent speech, normal   rate and volume, able to articulate logical thoughts, able   to abstract reason, no tangential thoughts, no hallucinations   or delusions  Her affect is normal  RESP: No cough, no audible wheezing, able to talk in full sentences  Remainder of exam unable to be completed due to telephone visits    No results found for any visits on 10/07/20.        Assessment/Plan:    Assessment & Plan     Chronic nasal congestion  Persistent symptoms more consistent with allergic cause. Start low dose Prednisone and antihistamine. Follow-up if not improving in 1-2 weeks.  - fexofenadine (ALLEGRA) 60 MG tablet; Take 1 tablet (60 mg) by mouth 2 times daily  - predniSONE (DELTASONE) 20 MG tablet; Take 1 tablet (20 mg) by mouth daily for 5 days          See Patient Instructions    Return in about 2 weeks (around 10/21/2020) for if not improving.    [facility]    Phone call duration:  6 minutes

## 2020-10-27 ENCOUNTER — TELEPHONE (OUTPATIENT)
Dept: FAMILY MEDICINE | Facility: CLINIC | Age: 80
End: 2020-10-27

## 2020-10-27 NOTE — TELEPHONE ENCOUNTER
Called patient and notified her that visit is needed for request. Pt scheduled telephone visit for tomorrow with PCP.

## 2020-10-27 NOTE — TELEPHONE ENCOUNTER
Reason for call:  Patient reporting a symptom    Symptom or request: sinus infection, fever, chills, headache     Duration (how long have symptoms been present): 2 weeks    Have you been treated for this before? Yes    Additional comments: Patient wants an antibiotic Keflex 2 week supply    Phone Number patient can be reached at:  Home number on file [phone] (home)    Best Time:  any    Can we leave a detailed message on this number:  YES    Call taken on 10/27/2020 at 2:36 PM by Ridge De La O

## 2020-10-28 ENCOUNTER — VIRTUAL VISIT (OUTPATIENT)
Dept: FAMILY MEDICINE | Facility: CLINIC | Age: 80
End: 2020-10-28
Payer: COMMERCIAL

## 2020-10-28 DIAGNOSIS — J32.9 SINUSITIS, UNSPECIFIED CHRONICITY, UNSPECIFIED LOCATION: ICD-10-CM

## 2020-10-28 DIAGNOSIS — J44.9 CHRONIC OBSTRUCTIVE PULMONARY DISEASE, UNSPECIFIED COPD TYPE (H): Primary | ICD-10-CM

## 2020-10-28 PROCEDURE — 99213 OFFICE O/P EST LOW 20 MIN: CPT | Mod: TEL | Performed by: FAMILY MEDICINE

## 2020-10-28 RX ORDER — CEFDINIR 300 MG/1
300 CAPSULE ORAL 2 TIMES DAILY
Qty: 20 CAPSULE | Refills: 0 | Status: SHIPPED | OUTPATIENT
Start: 2020-10-28 | End: 2020-11-07

## 2020-10-28 ASSESSMENT — PATIENT HEALTH QUESTIONNAIRE - PHQ9: SUM OF ALL RESPONSES TO PHQ QUESTIONS 1-9: 15

## 2020-10-28 NOTE — PATIENT INSTRUCTIONS
"Discharge Instructions for COVID-19 Patients  You have--or may have--COVID-19. Please follow the instructions listed below.   If you have a weakened immune system, discuss with your doctor any other actions you need to take.  How can I protect others?  If you have symptoms (fever, cough, body aches or trouble breathing):    Stay home and away from others (self-isolate) until:  ? At least 10 days have passed since your symptoms started, And   ? You've had no fever--and no medicine that reduces fever--for 1 full day (24 hours), And    ? Your other symptoms have resolved (gotten better).  If you don't show symptoms, but testing showed that you have COVID-19:    Stay home and away from others (self-isolate). Follow the tips under \"How do I self-isolate?\" below for 10 days (20 days if you have a weak immune system).    You don't need to be retested for COVID-19 before going back to school or work. As long as you're fever-free and feeling better, you can go back to school, work and other activities after waiting the 10 or 20 days.   How do I self-isolate?    Stay in your own room, even for meals. Use your own bathroom if you can.    Stay away from others in your home. No hugging, kissing or shaking hands. No visitors.    Don't go to work, school or anywhere else.    Clean \"high touch\" surfaces often (doorknobs, counters, handles). Use household cleaning spray or wipes. You'll find a full list of  on the EPA website: www.epa.gov/pesticide-registration/list-n-disinfectants-use-against-sars-cov-2.    Cover your mouth and nose with a mask or other face covering to avoid spreading germs.    Wash your hands and face often. Use soap and water.    Caregivers in these groups are at risk for severe illness due to COVID-19:  ? People 65 years and older  ? People who live in a nursing home or long-term care facility  ? People with chronic disease (lung, heart, cancer, diabetes, kidney, liver, immunologic)  ? People who have a " weakened immune system, including those who:    Are in cancer treatment    Take medicine that weakens the immune system, such as corticosteroids    Had a bone marrow or organ transplant    Have an immune deficiency    Have poorly controlled HIV or AIDS    Are obese (body mass index of 40 or higher)    Smoke regularly    Caregivers should wear gloves while washing dishes, handling laundry and cleaning bedrooms and bathrooms.    Use caution when washing and drying laundry: Don't shake dirty laundry and use the warmest water setting that you can.    For more tips on managing your health at home, go to www.cdc.gov/coronavirus/2019-ncov/downloads/10Things.pdf.  How can I take care of myself at home?  1. Get lots of rest. Drink extra fluids (unless a doctor has told you not to).    2. Take Tylenol (acetaminophen) for fever or pain. If you have liver or kidney problems, ask your family doctor if it's okay to take Tylenol.     Adults can take either:  ? 650 mg (two 325 mg pills) every 4 to 6 hours, or   ? 1,000 mg (two 500 mg pills) every 8 hours as needed.  ? Note: Don't take more than 3,000 mg in one day. Acetaminophen is found in many medicines (both prescribed and over-the-counter medicines). Read all labels to be sure you don't take too much.   For children, check the Tylenol bottle for the right dose. The dose is based on the child's age or weight.  3. If you have other health problems (like cancer, heart failure, an organ transplant or severe kidney disease): Call your specialty clinic if you don't feel better in the next 2 days.    4. Know when to call 911. Emergency warning signs include:  ? Trouble breathing or shortness of breath  ? Pain or pressure in the chest that doesn't go away  ? Feeling confused like you haven't felt before, or not being able to wake up  ? Bluish-colored lips or face    5. Your doctor may have prescribed a blood thinner medicine. Follow their instructions.  Where can I get more  information?    [facility] - About COVID-19: [email or web address]    CDC - What to Do If You're Sick: www.cdc.gov/coronavirus/2019-ncov/about/steps-when-sick.html    CDC - Ending Home Isolation: www.cdc.gov/coronavirus/2019-ncov/hcp/disposition-in-home-patients.html    CDC - Caring for Someone: www.cdc.gov/coronavirus/2019-ncov/if-you-are-sick/care-for-someone.html    [facility] - Interim Guidance for Hospital Discharge to Home: www.health.[facility].[place]./diseases/coronavirus/hcp/hospdischarge.pdf    [facility] clinical trials (COVID-19 research studies): clinicalaffairs.[facility]-clinical-trials    Below are the COVID-19 hotlines at the [place] Department of Health ([facility]). Interpreters are available.  ? For health questions: Call 322-651-1404 or 1-267.954.3548 (7 a.m. to 7 p.m.)  ? For questions about schools and childcare: Call 479-269-4866 or 1-233.735.9416 (7 a.m. to 7 p.m.)    For informational purposes only. Not to replace the advice of your health care provider. Clinically reviewed by the Infection Prevention Team. Copyright   2020 [employer]. All rights reserved. TRAKLOK [id number] - REV 08/04/20.

## 2020-10-28 NOTE — PROGRESS NOTES
"Nuria Shelley is a 80 year old female who is being evaluated via a billable telephone visit.      The patient has been notified of following:     \"This telephone visit will be conducted via a call between you and your physician/provider. We have found that certain health care needs can be provided without the need for a physical exam.  This service lets us provide the care you need with a short phone conversation.  If a prescription is necessary we can send it directly to your pharmacy.  If lab work is needed we can place an order for that and you can then stop by our lab to have the test done at a later time.    Telephone visits are billed at different rates depending on your insurance coverage. During this emergency period, for some insurers they may be billed the same as an in-person visit.  Please reach out to your insurance provider with any questions.    If during the course of the call the physician/provider feels a telephone visit is not appropriate, you will not be charged for this service.\"    Patient has given verbal consent for Telephone visit?  Yes    What phone number would you like to be contacted at? [phone]    How would you like to obtain your AVS? Mail a copy  12:48 PM    Subjective     Nuria Shelley is a 80 year old female who presents via phone visit today for the following health issues:    HPI   Pt has allergies  Got 10 days of antibiotics  In august for sinus Infection       Acute Illness  Acute illness concerns: Sinus issue  Onset/Duration: July 2020  Symptoms:  Fever: YES-low grade 99  Chills/Sweats: YES  Headache (location?): YES  Sinus Pressure: YES  Conjunctivitis:  no  Ear Pain: no  Rhinorrhea: YES  Congestion: YES  Sore Throat: no  Cough: no  Wheeze: no  Decreased Appetite: YES  Nausea: no  Vomiting: no  Diarrhea: no  Dysuria/Freq.: no  Dysuria or Hematuria: no  Fatigue/Achiness: YES  Facial pain  Sick/Strep Exposure: no  Therapies tried and outcome: cranberry juice and " pills    Review of Systems   CONSTITUTIONAL: NEGATIVE for fever, chills, change in weight  INTEGUMENTARY/SKIN: NEGATIVE for worrisome rashes, moles or lesions  ENT/MOUTH: nasal congestion  RESP:cough nonprodutive , no sob  CV: NEGATIVE for chest pain, palpitations or peripheral edema  GI: NEGATIVE for nausea, abdominal pain, heartburn, or change in bowel habits  PSYCHIATRIC: NEGATIVE for changes in mood or affect  ROS otherwise negative       Objective          Vitals:  No vitals were obtained today due to virtual visit.  /68, Pulse -88  Temp  99  Per patient  , alert and no distress  PSYCH: Alert and oriented times 3; coherent speech, normal   rate and volume, able to articulate logical thoughts, able   to abstract reason, no tangential thoughts, no hallucinations   or delusions  Her affect is normal  RESP: No cough, no audible wheezing, able to talk in full sentences  Remainder of exam unable to be completed due to telephone visits            Assessment/Plan:    Assessment & Plan     Chronic obstructive pulmonary disease, unspecified COPD type (H)  Stable     Sinusitis, unspecified chronicity, unspecified location  SEE [facility] care orders  The potential side effects of this medication have been discussed with the patient.  Call if any significant problems with these are experienced.    - cefdinir (OMNICEF) 300 MG capsule; Take 1 capsule (300 mg) by mouth 2 times daily for 10 days  Pt declined covid test  Advised if worse on any sob -go to JENNIFER Ham MD  [facility]    Phone call duration:  As above/ minutes    Discharge Instructions for COVID-19 Patients  You have--or may have--COVID-19. Please follow the instructions listed below.   If you have a weakened immune system, discuss with your doctor any other actions you need to take.  How can I protect others?  If you have symptoms (fever, cough, body aches or trouble breathing):    Stay home and away from others (self-isolate)  "until:  ? At least 10 days have passed since your symptoms started, And   ? You've had no fever--and no medicine that reduces fever--for 1 full day (24 hours), And    ? Your other symptoms have resolved (gotten better).  If you don't show symptoms, but testing showed that you have COVID-19:    Stay home and away from others (self-isolate). Follow the tips under \"How do I self-isolate?\" below for 10 days (20 days if you have a weak immune system).    You don't need to be retested for COVID-19 before going back to school or work. As long as you're fever-free and feeling better, you can go back to school, work and other activities after waiting the 10 or 20 days.   How do I self-isolate?    Stay in your own room, even for meals. Use your own bathroom if you can.    Stay away from others in your home. No hugging, kissing or shaking hands. No visitors.    Don't go to work, school or anywhere else.    Clean \"high touch\" surfaces often (doorknobs, counters, handles). Use household cleaning spray or wipes. You'll find a full list of  on the EPA website: www.epa.gov/pesticide-registration/list-n-disinfectants-use-against-sars-cov-2.    Cover your mouth and nose with a mask or other face covering to avoid spreading germs.    Wash your hands and face often. Use soap and water.    Caregivers in these groups are at risk for severe illness due to COVID-19:  ? People 65 years and older  ? People who live in a nursing home or long-term care facility  ? People with chronic disease (lung, heart, cancer, diabetes, kidney, liver, immunologic)  ? People who have a weakened immune system, including those who:    Are in cancer treatment    Take medicine that weakens the immune system, such as corticosteroids    Had a bone marrow or organ transplant    Have an immune deficiency    Have poorly controlled HIV or AIDS    Are obese (body mass index of 40 or higher)    Smoke regularly    Caregivers should wear gloves while washing " dishes, handling laundry and cleaning bedrooms and bathrooms.    Use caution when washing and drying laundry: Don't shake dirty laundry and use the warmest water setting that you can.    For more tips on managing your health at home, go to www.cdc.gov/coronavirus/2019-ncov/downloads/10Things.pdf.  How can I take care of myself at home?  1. Get lots of rest. Drink extra fluids (unless a doctor has told you not to).    2. Take Tylenol (acetaminophen) for fever or pain. If you have liver or kidney problems, ask your family doctor if it's okay to take Tylenol.     Adults can take either:  ? 650 mg (two 325 mg pills) every 4 to 6 hours, or   ? 1,000 mg (two 500 mg pills) every 8 hours as needed.  ? Note: Don't take more than 3,000 mg in one day. Acetaminophen is found in many medicines (both prescribed and over-the-counter medicines). Read all labels to be sure you don't take too much.   For children, check the Tylenol bottle for the right dose. The dose is based on the child's age or weight.  3. If you have other health problems (like cancer, heart failure, an organ transplant or severe kidney disease): Call your specialty clinic if you don't feel better in the next 2 days.    4. Know when to call 911. Emergency warning signs include:  ? Trouble breathing or shortness of breath  ? Pain or pressure in the chest that doesn't go away  ? Feeling confused like you haven't felt before, or not being able to wake up  ? Bluish-colored lips or face    5. Your doctor may have prescribed a blood thinner medicine. Follow their instructions.  Where can I get more information?     [facility] - About COVID-19: [email or web address]    CDC - What to Do If You're Sick: www.cdc.gov/coronavirus/2019-ncov/about/steps-when-sick.html    CDC - Ending Home Isolation: www.cdc.gov/coronavirus/2019-ncov/hcp/disposition-in-home-patients.html    CDC - Caring for Someone:  www.cdc.gov/coronavirus/2019-ncov/if-you-are-sick/care-for-someone.html    [facility] - Interim Guidance for Hospital Discharge to Home: www.health.[facility]/diseases/coronavirus/hcp/hospdischarge.pdf    [facility] clinical trials (COVID-19 research studies): [email or web address]-clinical-trials    Below are the COVID-19 hotlines at the [place] Department of Health ([facility]). Interpreters are available.  ? For health questions: Call 757-647-6260 or 1-251.721.6320 (7 a.m. to 7 p.m.)  ? For questions about schools and childcare: Call 093-580-1359 or 1-762.881.1114 (7 a.m. to 7 p.m.)    For informational purposes only. Not to replace the advice of your health care provider. Clinically reviewed by the Infection Prevention Team. Copyright   2020 [facility]. All rights reserved. [facility] 951916 - REV 08/04/20.   instructions      12:59 PM -visit complete

## 2020-11-05 ENCOUNTER — TELEPHONE (OUTPATIENT)
Dept: FAMILY MEDICINE | Facility: CLINIC | Age: 80
End: 2020-11-05

## 2020-11-05 DIAGNOSIS — J01.90 ACUTE SINUSITIS, RECURRENCE NOT SPECIFIED, UNSPECIFIED LOCATION: Primary | ICD-10-CM

## 2020-11-05 NOTE — TELEPHONE ENCOUNTER
Reason for call:  Other   Patient called regarding (reason for call): call back  Additional comments: Patients sinus infection is not getting better and antibiotics are not working for her she maria antonia like to discuss other options please call back.     Phone number to reach patient:  Home number on file [phone] (home)    Best Time:  any    Can we leave a detailed message on this number?  YES    Travel screening: Negative

## 2020-11-05 NOTE — TELEPHONE ENCOUNTER
Spoke with patient and daughter and gave referral information, she will call and schedule.   Malika Reeves RN

## 2020-11-05 NOTE — TELEPHONE ENCOUNTER
Patient had virtual visit on 10/28/20 for Sinusitis and is taking Cefdinir 300mg BID for 10 days.  She has been on antibiotics for 7 days and symptoms have improved a little.  Daughter is concerned that her symptoms are not that much better and wondering if she needs to be seen in clinic.  Patient continues to have nasal congestions and facial pressure. Patient is also dizzy.  No fever, denies chest pain.  Patient has some SOB with exertion.  daughter did check oxygen level with home pulse oximeter and the lowest reading was 92%.confirmed patient is taking antibiotic as ordered and also taking the allegra and flonase but symptoms have not improved.   Malika Reeves RN

## 2020-11-09 DIAGNOSIS — M51.369 DDD (DEGENERATIVE DISC DISEASE), LUMBAR: ICD-10-CM

## 2020-11-10 RX ORDER — DICLOFENAC SODIUM 75 MG/1
TABLET, DELAYED RELEASE ORAL
Qty: 180 TABLET | Refills: 0 | Status: SHIPPED | OUTPATIENT
Start: 2020-11-10 | End: 2021-09-22

## 2020-11-10 NOTE — TELEPHONE ENCOUNTER
Routing refill request to provider for review/approval because:  Labs not current:  CBC, creatinine, ALT, AST    Patient is > 64 years old so RN unable to refill this medication per protocol.    Routing to provider to advise.  Yanelis Boyle BSN, RN

## 2020-11-16 ENCOUNTER — TELEPHONE (OUTPATIENT)
Dept: FAMILY MEDICINE | Facility: CLINIC | Age: 80
End: 2020-11-16

## 2020-11-16 NOTE — TELEPHONE ENCOUNTER
Reason for Call:  Other prescription    Detailed comments: pt has a very bad uti.  Would like meds  Uses [facility].  Also would like a all back    Phone Number Patient can be reached at: Home number on file [phone] (home)    Best Time: any    Can we leave a detailed message on this number? YES    Call taken on 11/16/2020 at 1:47 PM by Eunice Palma

## 2020-11-16 NOTE — TELEPHONE ENCOUNTER
Called patient and notified her that visit is needed for symptoms (UTI). Pt has appt scheduled already for tomorrow. Symptoms: pain in side left and bladder when urinating, urinary frequency. She states that she feels like she can barely make it to the bathroom sometimes. Notified patient that if she would like a visit today, she can go to [email or web address] to submit a visit. She verbalized understanding. Pt states that she does have some AZO and may try for some symptom relief.

## 2020-11-17 ENCOUNTER — VIRTUAL VISIT (OUTPATIENT)
Dept: FAMILY MEDICINE | Facility: CLINIC | Age: 80
End: 2020-11-17
Payer: COMMERCIAL

## 2020-11-17 DIAGNOSIS — E78.5 HYPERLIPIDEMIA LDL GOAL <130: Primary | ICD-10-CM

## 2020-11-17 DIAGNOSIS — R09.81 CHRONIC NASAL CONGESTION: ICD-10-CM

## 2020-11-17 DIAGNOSIS — F33.0 MAJOR DEPRESSIVE DISORDER, RECURRENT EPISODE, MILD (H): ICD-10-CM

## 2020-11-17 DIAGNOSIS — M51.369 DDD (DEGENERATIVE DISC DISEASE), LUMBAR: ICD-10-CM

## 2020-11-17 DIAGNOSIS — R32 URINARY INCONTINENCE, UNSPECIFIED TYPE: ICD-10-CM

## 2020-11-17 PROCEDURE — 99214 OFFICE O/P EST MOD 30 MIN: CPT | Mod: TEL | Performed by: FAMILY MEDICINE

## 2020-11-17 RX ORDER — FLUOXETINE 10 MG/1
10 CAPSULE ORAL DAILY
Qty: 90 CAPSULE | Refills: 1 | Status: SHIPPED | OUTPATIENT
Start: 2020-11-17 | End: 2021-05-10

## 2020-11-17 RX ORDER — SIMVASTATIN 20 MG
TABLET ORAL
Qty: 90 TABLET | Refills: 1 | Status: SHIPPED | OUTPATIENT
Start: 2020-11-17 | End: 2021-07-19

## 2020-11-17 RX ORDER — DICLOFENAC SODIUM 75 MG/1
75 TABLET, DELAYED RELEASE ORAL 2 TIMES DAILY
Qty: 180 TABLET | Refills: 0 | Status: CANCELLED | OUTPATIENT
Start: 2020-11-17

## 2020-11-17 RX ORDER — FEXOFENADINE HCL 60 MG/1
60 TABLET, FILM COATED ORAL 2 TIMES DAILY
Qty: 180 TABLET | Refills: 0 | Status: SHIPPED | OUTPATIENT
Start: 2020-11-17 | End: 2021-05-19

## 2020-11-17 NOTE — PROGRESS NOTES
"Nuria Shelley is a 80 year old female who is being evaluated via a billable telephone visit.      The patient has been notified of following:     \"This telephone visit will be conducted via a call between you and your physician/provider. We have found that certain health care needs can be provided without the need for a physical exam.  This service lets us provide the care you need with a short phone conversation.  If a prescription is necessary we can send it directly to your pharmacy.  If lab work is needed we can place an order for that and you can then stop by our lab to have the test done at a later time.    Telephone visits are billed at different rates depending on your insurance coverage. During this emergency period, for some insurers they may be billed the same as an in-person visit.  Please reach out to your insurance provider with any questions.    If during the course of the call the physician/provider feels a telephone visit is not appropriate, you will not be charged for this service.\"    Patient has given verbal consent for Telephone visit?  Yes    What phone number would you like to be contacted at? [phone]    How would you like to obtain your AVS? MyChart  1:08 PM-start visit    Subjective     Nuria Shelley is a 80 year old female who presents via phone visit today for the following health issues:    HPI   finished antibiotics a few days ago  And still has Mild dysuria    Genitourinary - Female  Onset/Duration: 2 weeks ago  Description:   Painful urination (Dysuria): YES-occasional  mild           Frequency: YES  Blood in urine (Hematuria): no    Intensity: moderate  Progression of Symptoms:  worsening  Accompanying Signs & Symptoms:  Fever/chills-no  Flank pain:no  Nausea and vomiting: no  Vaginal symptoms: none  Abdominal/Pelvic Pain:   History:   History of frequent UTI s: no  History of kidney stones: YES 60 years ago    Possibility of pregnancy: No  Precipitating or alleviating " factors: None  Therapies tried and outcome: Cranberry juice prn (contraindicated in Coumadin patients) and OTC advil or tylenol   Pt has a temp 96.8    Depression Followup    How are you doing with your depression since your last visit? same    Are you having other symptoms that might be associated with depression? No    Have you had a significant life event?  No     Are you feeling anxious or having panic attacks?   No    Do you have any concerns with your use of alcohol or other drugs? No    Social History     Tobacco Use     Smoking status: Former Smoker     Years: 20.00     Types: Cigarettes     Quit date: 1980     Years since quittin.8     Smokeless tobacco: Never Used   Substance Use Topics     Alcohol use: Yes     Comment: ocass     Drug use: No     PHQ 2019 3/24/2020 10/28/2020   PHQ-9 Total Score 8 18 15   Q9: Thoughts of better off dead/self-harm past 2 weeks Several days Not at all Not at all     ISH-7 SCORE 2019 2019 3/24/2020   Total Score 8 11 10     Last PHQ-9 10/28/2020   1.  Little interest or pleasure in doing things 2   2.  Feeling down, depressed, or hopeless 2   3.  Trouble falling or staying asleep, or sleeping too much 3   4.  Feeling tired or having little energy 3   5.  Poor appetite or overeating 1   6.  Feeling bad about yourself 0   7.  Trouble concentrating 2   8.  Moving slowly or restless 2   Q9: Thoughts of better off dead/self-harm past 2 weeks 0   PHQ-9 Total Score 15   Difficulty at work, home, or with people Very difficult     ISH-7  3/24/2020   1. Feeling nervous, anxious, or on edge 3   2. Not being able to stop or control worrying 3   3. Worrying too much about different things 0   4. Trouble relaxing 1   5. Being so restless that it is hard to sit still 0   6. Becoming easily annoyed or irritable 3   7. Feeling afraid, as if something awful might happen 0   ISH-7 Total Score 10   If you checked any problems, how difficult have they made it for you to  do your work, take care of things at home, or get along with other people? Very difficult     Pt lives alone and stressed with the pandemic    Suicide Assessment Five-step Evaluation and Treatment (SAFE-T)  Review of Systems   Rest of the pertinent  ROS is Negative except see above and Problem list [stable]    Objective          Vitals:  No vitals were obtained today due to virtual visit.    alert and no distress  PSYCH: Alert and oriented times 3; coherent speech, normal   rate and volume, able to articulate logical thoughts, able   to abstract reason, no tangential thoughts, no hallucinations   or delusions  Her affect is anxious  RESP: No cough, no audible wheezing, able to talk in full sentences  Remainder of exam unable to be completed due to telephone visits    Pending         Assessment/Plan:    ICD-10-CM    1. Hyperlipidemia LDL goal <130  E78.5 simvastatin (ZOCOR) 20 MG tablet     Lipid panel reflex to direct LDL Fasting     Comprehensive metabolic panel   2. Chronic nasal congestion  R09.81 fexofenadine (ALLEGRA) 60 MG tablet   3. DDD (degenerative disc disease), lumbar  M51.36 Comprehensive metabolic panel   4. Major depressive disorder, recurrent episode, mild (H)  F33.0 FLUoxetine (PROZAC) 10 MG capsule   5. Urinary incontinence, unspecified type  R32      Pt needs to make a lab appointment   Also advised that she come to clinic to be seen for her Urinary issues and a exam  She will make a appointment this week for her ongoing issues  Refills have been done    Assessment & Plan     Nuria was seen today for urinary problem.    Diagnoses and all orders for this visit:    Hyperlipidemia LDL goal <130  -     simvastatin (ZOCOR) 20 MG tablet; TAKE ONE TABLET BY MOUTH EVERY DAY AT BEDTIME  -     Lipid panel reflex to direct LDL Fasting; Future  -     Comprehensive metabolic panel; Future    Chronic nasal congestion  -     fexofenadine (ALLEGRA) 60 MG tablet; Take 1 tablet (60 mg) by mouth 2 times  "daily    DDD (degenerative disc disease), lumbar  -     Comprehensive metabolic panel; Future    Major depressive disorder, recurrent episode, mild (H)  -     FLUoxetine (PROZAC) 10 MG capsule; Take 1 capsule (10 mg) by mouth daily    Urinary incontinence, unspecified type      Advised  She make appointment in clinic this week to be seen and get labs and Urine check           BMI:   Estimated body mass index is 25.75 kg/m  as calculated from the following:    Height as of 8/16/20: 1.626 m (5' 4\").    Weight as of 9/15/20: 68 kg (150 lb).            1:24 PM  End visit    Betty Ham MD  [facility]    Phone call duration:  As above/ minutes                "

## 2020-12-12 ENCOUNTER — HEALTH MAINTENANCE LETTER (OUTPATIENT)
Age: 80
End: 2020-12-12

## 2020-12-23 ENCOUNTER — TRANSFERRED RECORDS (OUTPATIENT)
Dept: HEALTH INFORMATION MANAGEMENT | Facility: CLINIC | Age: 80
End: 2020-12-23

## 2021-01-01 ENCOUNTER — TRANSFERRED RECORDS (OUTPATIENT)
Dept: HEALTH INFORMATION MANAGEMENT | Facility: CLINIC | Age: 81
End: 2021-01-01
Payer: COMMERCIAL

## 2021-01-01 ENCOUNTER — MEDICAL CORRESPONDENCE (OUTPATIENT)
Dept: HEALTH INFORMATION MANAGEMENT | Facility: CLINIC | Age: 81
End: 2021-01-01
Payer: COMMERCIAL

## 2021-01-01 DIAGNOSIS — R09.81 CHRONIC NASAL CONGESTION: ICD-10-CM

## 2021-01-01 RX ORDER — FLUTICASONE PROPIONATE 50 MCG
SPRAY, SUSPENSION (ML) NASAL
Qty: 16 G | Refills: 1 | Status: SHIPPED | OUTPATIENT
Start: 2021-01-01 | End: 2022-01-01

## 2021-01-19 ENCOUNTER — VIRTUAL VISIT (OUTPATIENT)
Dept: FAMILY MEDICINE | Facility: CLINIC | Age: 81
End: 2021-01-19
Payer: COMMERCIAL

## 2021-01-19 DIAGNOSIS — C50.911 MALIGNANT NEOPLASM OF RIGHT FEMALE BREAST, UNSPECIFIED ESTROGEN RECEPTOR STATUS, UNSPECIFIED SITE OF BREAST (H): ICD-10-CM

## 2021-01-19 DIAGNOSIS — J44.9 CHRONIC OBSTRUCTIVE PULMONARY DISEASE, UNSPECIFIED COPD TYPE (H): ICD-10-CM

## 2021-01-19 DIAGNOSIS — E78.5 HYPERLIPIDEMIA LDL GOAL <130: Primary | ICD-10-CM

## 2021-01-19 DIAGNOSIS — F33.0 MAJOR DEPRESSIVE DISORDER, RECURRENT EPISODE, MILD (H): ICD-10-CM

## 2021-01-19 DIAGNOSIS — C50.919 METASTATIC BREAST CANCER: ICD-10-CM

## 2021-01-19 PROCEDURE — 99214 OFFICE O/P EST MOD 30 MIN: CPT | Mod: TEL | Performed by: FAMILY MEDICINE

## 2021-01-19 RX ORDER — ALBUTEROL SULFATE 90 UG/1
AEROSOL, METERED RESPIRATORY (INHALATION)
Qty: 18 G | Refills: 12 | Status: SHIPPED | OUTPATIENT
Start: 2021-01-19 | End: 2022-01-01

## 2021-01-19 ASSESSMENT — PATIENT HEALTH QUESTIONNAIRE - PHQ9: SUM OF ALL RESPONSES TO PHQ QUESTIONS 1-9: 4

## 2021-01-19 NOTE — PROGRESS NOTES
"Nuria is a 80 year old who is being evaluated via a billable telephone visit.      What phone number would you like to be contacted at? [phone]  How would you like to obtain your AVS? Andres   4:55 PM -start visit  Assessment & Plan     Chronic obstructive pulmonary disease, unspecified COPD type (H)  Stable   - albuterol (VENTOLIN HFA) 108 (90 Base) MCG/ACT inhaler; INHALE 2 PUFFS INTO THE LUNGS EVERY 6 HOURS AS NEEDED FOR SHORTNESS OF BREATH/DYSPNEA    Hyperlipidemia LDL goal <130  Pt will follow up labs    Major depressive disorder, recurrent episode, mild (H)  Stable     Metastatic breast cancer (H)  Sees Oncology    Malignant neoplasm of right female breast, unspecified estrogen receptor status, unspecified site of breast (H)  As above      Review of external notes as documented above                    BMI:   Estimated body mass index is 25.75 kg/m  as calculated from the following:    Height as of 8/16/20: 1.626 m (5' 4\").    Weight as of 9/15/20: 68 kg (150 lb).             Return in about 1 month (around 2/19/2021) for Medicare wellness Exam.    Betty Ham MD  [facility]    Josh Quiñones is a 80 year old who presents to clinic today for the following health issues     HPI       COPD Follow-Up    Overall, how are your COPD symptoms since your last clinic visit?  Slightly worse    How much fatigue or shortness of breath do you have when you are walking?  More than usual    How much shortness of breath do you have when you are resting?  None    How often do you cough? Rarely    Have you noticed any change in your sputum/phlegm?  Yes- dripping in back of throat.    Have you experienced a recent fever? No    Please describe how far you can walk without stopping to rest:  The length of 1-2 rooms    How many flights of stairs are you able to walk up without stopping?  None    Have you had any Emergency Room Visits, Urgent Care Visits, or Hospital Admissions because of your " COPD since your last office visit?  No    History   Smoking Status     Former Smoker     Years: 20.00     Types: Cigarettes     Quit date: 1980   Smokeless Tobacco     Never Used     Lab Results   Component Value Date    FEV1 69 2015    JAB1VGJ 123 2015         Depression and Anxiety Follow-Up    How are you doing with your depression since your last visit? Improved     How are you doing with your anxiety since your last visit?  Improved     Are you having other symptoms that might be associated with depression or anxiety? No    Have you had a significant life event? No     Do you have any concerns with your use of alcohol or other drugs? No    Social History     Tobacco Use     Smoking status: Former Smoker     Years: 20.00     Types: Cigarettes     Quit date: 1980     Years since quittin.0     Smokeless tobacco: Never Used   Substance Use Topics     Alcohol use: Yes     Comment: ocass     Drug use: No     PHQ 3/24/2020 10/28/2020 2021   PHQ-9 Total Score 18 15 4   Q9: Thoughts of better off dead/self-harm past 2 weeks Not at all Not at all Not at all     ISH-7 SCORE 2019 2019 3/24/2020   Total Score 8 11 10     Last PHQ-9 2021   1.  Little interest or pleasure in doing things 0   2.  Feeling down, depressed, or hopeless 1   3.  Trouble falling or staying asleep, or sleeping too much 0   4.  Feeling tired or having little energy 1   5.  Poor appetite or overeating 0   6.  Feeling bad about yourself 1   7.  Trouble concentrating 1   8.  Moving slowly or restless 0   Q9: Thoughts of better off dead/self-harm past 2 weeks 0   PHQ-9 Total Score 4   Difficulty at work, home, or with people Somewhat difficult     ISH-7  3/24/2020   1. Feeling nervous, anxious, or on edge 3   2. Not being able to stop or control worrying 3   3. Worrying too much about different things 0   4. Trouble relaxing 1   5. Being so restless that it is hard to sit still 0   6. Becoming easily annoyed  or irritable 3   7. Feeling afraid, as if something awful might happen 0   ISH-7 Total Score 10   If you checked any problems, how difficult have they made it for you to do your work, take care of things at home, or get along with other people? Very difficult       Suicide Assessment Five-step Evaluation and Treatment (SAFE-T)      How many servings of fruits and vegetables do you eat daily?  4 or more    On average, how many sweetened beverages do you drink each day (Examples: soda, juice, sweet tea, etc.  Do NOT count diet or artificially sweetened beverages)?   0    How many days per week do you exercise enough to make your heart beat faster? 3 or less    How many minutes a day do you exercise enough to make your heart beat faster? 9 or less    How many days per week do you miss taking your medication? 0    Patient would like to discuss physical therapy referral [facility].    Review of Systems   Rest of the ROS is Negative except see above and Problem list [stable]        Objective    Vitals - Patient Reported  Systolic (Patient Reported): 136  Diastolic (Patient Reported): 80  SpO2 (Patient Reported): 93    Oximetry 93%-95%  BP-  136/80  Pulse  90  Vitals:  No vitals were obtained today due to virtual visit.    Physical Exam   healthy, alert and no distress  PSYCH: Alert and oriented times 3; coherent speech, normal   rate and volume, able to articulate logical thoughts, able   to abstract reason, no tangential thoughts, no hallucinations   or delusions  Her affect is normal  RESP: No cough, no audible wheezing, able to talk in full sentences  Remainder of exam unable to be completed due to telephone visits        5:08 PM -end visit  As above  Phone call duration: 11  minutes

## 2021-01-27 ENCOUNTER — OFFICE VISIT (OUTPATIENT)
Dept: FAMILY MEDICINE | Facility: CLINIC | Age: 81
End: 2021-01-27
Payer: COMMERCIAL

## 2021-01-27 ENCOUNTER — OFFICE VISIT (OUTPATIENT)
Dept: ORTHOPEDICS | Facility: CLINIC | Age: 81
End: 2021-01-27
Payer: COMMERCIAL

## 2021-01-27 VITALS
DIASTOLIC BLOOD PRESSURE: 73 MMHG | OXYGEN SATURATION: 97 % | BODY MASS INDEX: 23.78 KG/M2 | HEART RATE: 101 BPM | WEIGHT: 148 LBS | SYSTOLIC BLOOD PRESSURE: 137 MMHG | HEIGHT: 66 IN

## 2021-01-27 VITALS
SYSTOLIC BLOOD PRESSURE: 137 MMHG | RESPIRATION RATE: 24 BRPM | TEMPERATURE: 97.5 F | OXYGEN SATURATION: 97 % | HEIGHT: 64 IN | WEIGHT: 148 LBS | DIASTOLIC BLOOD PRESSURE: 73 MMHG | BODY MASS INDEX: 25.27 KG/M2 | HEART RATE: 101 BPM

## 2021-01-27 DIAGNOSIS — Z96.649: ICD-10-CM

## 2021-01-27 DIAGNOSIS — S72.112A CLOSED DISPLACED FRACTURE OF GREATER TROCHANTER OF LEFT FEMUR, INITIAL ENCOUNTER (H): ICD-10-CM

## 2021-01-27 DIAGNOSIS — T84.59XA: ICD-10-CM

## 2021-01-27 DIAGNOSIS — M24.452 RECURRENT DISLOCATION OF LEFT HIP: Primary | ICD-10-CM

## 2021-01-27 DIAGNOSIS — E78.5 HYPERLIPIDEMIA LDL GOAL <130: ICD-10-CM

## 2021-01-27 LAB
CHOLEST SERPL-MCNC: 190 MG/DL
CREAT SERPL-MCNC: 0.59 MG/DL (ref 0.52–1.04)
GFR SERPL CREATININE-BSD FRML MDRD: 85 ML/MIN/{1.73_M2}
HDLC SERPL-MCNC: 69 MG/DL
LDLC SERPL CALC-MCNC: 103 MG/DL
NONHDLC SERPL-MCNC: 121 MG/DL
TRIGL SERPL-MCNC: 77 MG/DL

## 2021-01-27 PROCEDURE — 99203 OFFICE O/P NEW LOW 30 MIN: CPT | Performed by: ORTHOPAEDIC SURGERY

## 2021-01-27 PROCEDURE — 82565 ASSAY OF CREATININE: CPT | Performed by: FAMILY MEDICINE

## 2021-01-27 PROCEDURE — 99213 OFFICE O/P EST LOW 20 MIN: CPT | Performed by: FAMILY MEDICINE

## 2021-01-27 PROCEDURE — 36415 COLL VENOUS BLD VENIPUNCTURE: CPT | Performed by: FAMILY MEDICINE

## 2021-01-27 PROCEDURE — 80061 LIPID PANEL: CPT | Performed by: FAMILY MEDICINE

## 2021-01-27 ASSESSMENT — PATIENT HEALTH QUESTIONNAIRE - PHQ9
SUM OF ALL RESPONSES TO PHQ QUESTIONS 1-9: 10
5. POOR APPETITE OR OVEREATING: SEVERAL DAYS

## 2021-01-27 ASSESSMENT — PAIN SCALES - GENERAL: PAINLEVEL: NO PAIN (0)

## 2021-01-27 ASSESSMENT — MIFFLIN-ST. JEOR
SCORE: 1148.07
SCORE: 1116.32

## 2021-01-27 ASSESSMENT — ANXIETY QUESTIONNAIRES
6. BECOMING EASILY ANNOYED OR IRRITABLE: SEVERAL DAYS
IF YOU CHECKED OFF ANY PROBLEMS ON THIS QUESTIONNAIRE, HOW DIFFICULT HAVE THESE PROBLEMS MADE IT FOR YOU TO DO YOUR WORK, TAKE CARE OF THINGS AT HOME, OR GET ALONG WITH OTHER PEOPLE: SOMEWHAT DIFFICULT
5. BEING SO RESTLESS THAT IT IS HARD TO SIT STILL: NOT AT ALL
2. NOT BEING ABLE TO STOP OR CONTROL WORRYING: SEVERAL DAYS
1. FEELING NERVOUS, ANXIOUS, OR ON EDGE: SEVERAL DAYS
3. WORRYING TOO MUCH ABOUT DIFFERENT THINGS: SEVERAL DAYS

## 2021-01-27 NOTE — PATIENT INSTRUCTIONS
At this time, we do not know when the covid vaccine will be available to the public. Please follow this link for updates.     [email or web address]     Sincerely,  Betty Ham MD

## 2021-01-27 NOTE — LETTER
1/27/2021         RE: Nuria Shelley  [address] [facility] [address]        Dear Colleague,    Thank you for referring your patient, Nuria Shelley, to the [facility]. Please see a copy of my visit note below.    SUBJECTIVE:   Nuria Shelley is a 80 year old female who is seen in consultation at the request of Dr. Ham for evaluation of left hip problems.  In June 2018 she sustained a left femoral neck fracture.  For this she underwent left hip hemiarthroplasty by Dr. Barillas at [facility].  She fell a month later sustaining a widely displaced greater trochanter fracture.  She subsequently developed an MRSA infection in the hip 3 months later.  Because of her advanced metastatic cancer a two-stage procedure was felt to be not survivable and instead opted for irrigation and debridement with chronic antibiotic suppression.  In 2019 she fell and sustained a nondisplaced pelvic fracture.    She has also had seven dislocations of her hip prosthesis over the past 1 year.   the last dislocation appears to have been 12/19/2020.  All the reductions appear to be performed in the ER at [facility].    She is here for referral to physical therapy. Doesn't want surgery.      Present symptoms: having pain in the hip, on chronic oxycodone.    Pain to walk, pain at night.  Hip dislocates with minimal movements  Has a hip brace.    Last saw her orthopedist and 2nd opinion.  Further surgery suggested, but she doesn't want surgery..  Told recently that there is a leg length discrepancy, and says that a left shoe lift helps..    Review of Systems:  Constitutional:  NEGATIVE for fever, chills, change in weight  Integumentary/Skin:  NEGATIVE for worrisome rashes, moles or lesions  Eyes:  NEGATIVE for vision changes or irritation  ENT/Mouth:  NEGATIVE for ear, mouth and throat problems  Resp:  NEGATIVE for significant cough or SOB  Breast:  NEGATIVE for masses, tenderness or  discharge  CV:  NEGATIVE for chest pain, palpitations or peripheral edema  GI:  NEGATIVE for nausea, abdominal pain, heartburn, or change in bowel habits  :  Negative   Musculoskeletal:  See HPI above  Neuro:  NEGATIVE for weakness, dizziness or paresthesias  Endocrine:  NEGATIVE for temperature intolerance, skin/hair changes  Heme/allergy/immune:  NEGATIVE for bleeding problems  Psychiatric:  NEGATIVE for changes in mood or affect    Past Medical History:   Past Medical History:   Diagnosis Date     Allergic rhinitis      Back strain      Bone metastases (H)      Breast cancer (H)     Right     Constipation      COPD (chronic obstructive pulmonary disease) (H)      Depression      Diverticulosis 2013     GERD (gastroesophageal reflux disease)      IBS (irritable bowel syndrome)      Malignant pleural effusion      Obesity      Urinary incontinence 2010     Urinary stress incontinence      Wrist tendonitis     Left     Past Surgical History:   Past Surgical History:   Procedure Laterality Date     BREAST LUMPECTOMY, RT/LT      Breast Lumpectomy RT/     BUNIONECTOMY RT/LT       C  DELIVERY ONLY       HC EXCISION BREAST LESION, OPEN >=1      bilateral     HYSTERECTOMY, CERVIX STATUS UNKNOWN      partial     SURGICAL HISTORY OF -       Left hand surgery     SURGICAL HISTORY OF -   2005    Left knee surgery     Family History:   Family History   Problem Relation Age of Onset     Heart Disease Mother      Diabetes Mother         d 70 from CAD     Cerebrovascular Disease Father         d age 82     Heart Disease Maternal Grandmother      Diabetes Brother      Heart Disease Brother         MI age 60     Heart Disease Sister         d age 52 from MI     Diabetes Sister      Breast Cancer Daughter         age 50     Heart Disease Brother      Diabetes Brother      Alzheimer Disease Brother      Cancer Brother         pancreatic ca     Anesthesia Reaction No family hx of      Social  "History:   Social History     Tobacco Use     Smoking status: Former Smoker     Years: 20.00     Types: Cigarettes     Quit date: 1980     Years since quittin.0     Smokeless tobacco: Never Used   Substance Use Topics     Alcohol use: Yes     Comment: ocass     OBJECTIVE:  Physical Exam:  /73 (BP Location: Left arm, Patient Position: Sitting, Cuff Size: Adult Regular)   Pulse 101   Ht 1.676 m (5' 6\")   Wt 67.1 kg (148 lb)   SpO2 97%   BMI 23.89 kg/m    General Appearance: healthy, alert and no distress   Skin: no suspicious lesions or rashes  Neuro: Normal strength and tone, mentation intact and speech normal  Vascular: good pulses, and cappillary refill   Lymph: no lymphadenopathy   Psych:  mentation appears normal and affect normal/bright  Resp: no increased work of breathing     She has suprisingly good abduction strength.  Good flexion strength      X-rays:  Obtained from 2019 show a large, widely displaced greater troch fracture around a bipolar prosthesis.       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  She would need 2 stage revision and greater troch repair but she probably wouldn't survive that program, and doesn't want to consider it, or any part of it..  physical therapy is the only thing she'll consider  She has a specific therapist in mind. Through [facility].  I ordered this.    Return to clinic: as needed     JAGRUTI Naranjo MD  Dept. Orthopedic Surgery  [facility]         Again, thank you for allowing me to participate in the care of your patient.        Sincerely,        Anderson Naranjo MD    "

## 2021-01-27 NOTE — PROGRESS NOTES
SUBJECTIVE:   Nuria Shelley is a 80 year old female who is seen in consultation at the request of Dr. Ham for evaluation of left hip problems.  In June 2018 she sustained a left femoral neck fracture.  For this she underwent left hip hemiarthroplasty by Dr. Barillas at [facility].  She fell a month later sustaining a widely displaced greater trochanter fracture.  She subsequently developed an MRSA infection in the hip 3 months later.  Because of her advanced metastatic cancer a two-stage procedure was felt to be not survivable and instead opted for irrigation and debridement with chronic antibiotic suppression.  In 2019 she fell and sustained a nondisplaced pelvic fracture.    She has also had seven dislocations of her hip prosthesis over the past 1 year.   the last dislocation appears to have been 12/19/2020.  All the reductions appear to be performed in the ER at [facility].    She is here for referral to physical therapy. Doesn't want surgery.      Present symptoms: having pain in the hip, on chronic oxycodone.    Pain to walk, pain at night.  Hip dislocates with minimal movements  Has a hip brace.    Last saw her orthopedist and 2nd opinion.  Further surgery suggested, but she doesn't want surgery..  Told recently that there is a leg length discrepancy, and says that a left shoe lift helps..    Review of Systems:  Constitutional:  NEGATIVE for fever, chills, change in weight  Integumentary/Skin:  NEGATIVE for worrisome rashes, moles or lesions  Eyes:  NEGATIVE for vision changes or irritation  ENT/Mouth:  NEGATIVE for ear, mouth and throat problems  Resp:  NEGATIVE for significant cough or SOB  Breast:  NEGATIVE for masses, tenderness or discharge  CV:  NEGATIVE for chest pain, palpitations or peripheral edema  GI:  NEGATIVE for nausea, abdominal pain, heartburn, or change in bowel habits  :  Negative   Musculoskeletal:  See HPI above  Neuro:  NEGATIVE for weakness, dizziness or  paresthesias  Endocrine:  NEGATIVE for temperature intolerance, skin/hair changes  Heme/allergy/immune:  NEGATIVE for bleeding problems  Psychiatric:  NEGATIVE for changes in mood or affect    Past Medical History:   Past Medical History:   Diagnosis Date     Allergic rhinitis      Back strain      Bone metastases (H)      Breast cancer (H)     Right     Constipation      COPD (chronic obstructive pulmonary disease) (H)      Depression      Diverticulosis 2013     GERD (gastroesophageal reflux disease)      IBS (irritable bowel syndrome)      Malignant pleural effusion      Obesity      Urinary incontinence 2010     Urinary stress incontinence      Wrist tendonitis     Left     Past Surgical History:   Past Surgical History:   Procedure Laterality Date     BREAST LUMPECTOMY, RT/LT      Breast Lumpectomy RT/     BUNIONECTOMY RT/LT       C  DELIVERY ONLY       HC EXCISION BREAST LESION, OPEN >=1      bilateral     HYSTERECTOMY, CERVIX STATUS UNKNOWN      partial     SURGICAL HISTORY OF -       Left hand surgery     SURGICAL HISTORY OF -   2005    Left knee surgery     Family History:   Family History   Problem Relation Age of Onset     Heart Disease Mother      Diabetes Mother         d 70 from CAD     Cerebrovascular Disease Father         d age 82     Heart Disease Maternal Grandmother      Diabetes Brother      Heart Disease Brother         MI age 60     Heart Disease Sister         d age 52 from MI     Diabetes Sister      Breast Cancer Daughter         age 50     Heart Disease Brother      Diabetes Brother      Alzheimer Disease Brother      Cancer Brother         pancreatic ca     Anesthesia Reaction No family hx of      Social History:   Social History     Tobacco Use     Smoking status: Former Smoker     Years: 20.00     Types: Cigarettes     Quit date: 1980     Years since quittin.0     Smokeless tobacco: Never Used   Substance Use Topics     Alcohol use: Yes      "Comment: ocass     OBJECTIVE:  Physical Exam:  /73 (BP Location: Left arm, Patient Position: Sitting, Cuff Size: Adult Regular)   Pulse 101   Ht 1.676 m (5' 6\")   Wt 67.1 kg (148 lb)   SpO2 97%   BMI 23.89 kg/m    General Appearance: healthy, alert and no distress   Skin: no suspicious lesions or rashes  Neuro: Normal strength and tone, mentation intact and speech normal  Vascular: good pulses, and cappillary refill   Lymph: no lymphadenopathy   Psych:  mentation appears normal and affect normal/bright  Resp: no increased work of breathing     She has suprisingly good abduction strength.  Good flexion strength      X-rays:  Obtained from 2019 show a large, widely displaced greater troch fracture around a bipolar prosthesis.       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  She would need 2 stage revision and greater troch repair but she probably wouldn't survive that program, and doesn't want to consider it, or any part of it..  physical therapy is the only thing she'll consider  She has a specific therapist in mind. Through [facility].  I ordered this.    Return to clinic: as needed     JAGRUTI Naranjo MD  Dept. Orthopedic Surgery  [facility]     "

## 2021-01-27 NOTE — PROGRESS NOTES
"  Assessment & Plan     Recurrent dislocation of left hip  discussed she needs to see Orthopedics first to see what they Recommend  Pt has been advised surgery but she declines This  - Orthopedic & Spine  Referral; Future  - [facility] PT, HAND, AND CHIROPRACTIC REFERRAL; Future-needs to see Ortho first to see There recommendation  - Creatinine    Hyperlipidemia LDL goal <130    - Lipid panel reflex to direct LDL Non-fasting    Review of external notes as documented above         ER notes reviewed on care everywhere           BMI:   Estimated body mass index is 25.4 kg/m  as calculated from the following:    Height as of this encounter: 1.626 m (5' 4\").    Weight as of this encounter: 67.1 kg (148 lb).             Return in about 1 month (around 2/27/2021) for Medicare wellness Exam.    Betty Ham MD  [facility] HospitalHEATHRE Quiñones is a 80 year old who presents to clinic today for the following health issues     HPI   Chief Complaint   Patient presents with     Referral     Anxiety       Pt wants to go to [facility] for her Hip    Pt had a left Hip replacement in 6/2018  Pt has had Recurrent Hip Dislocation and ends up ER  Pt had second opinion at [facility] and has recommended surgery  She wants referral for Physical therapy  Review of Systems   CONSTITUTIONAL: NEGATIVE for fever, chills, change in weight  ENT/MOUTH: NEGATIVE for ear, mouth and throat problems  RESP: NEGATIVE for significant cough or SOB  CV: NEGATIVE for chest pain, palpitations or peripheral edema  MUSCULOSKELETAL: as above      Objective    /73   Pulse 101   Temp 97.5  F (36.4  C) (Oral)   Resp 24   Ht 1.626 m (5' 4\")   Wt 67.1 kg (148 lb)   SpO2 97%   BMI 25.40 kg/m    Body mass index is 25.4 kg/m .  Physical Exam   GENERAL: healthy, alert and no distress  ABDOMEN: soft, nontender, no hepatosplenomegaly, no masses and bowel sounds normal  MS: no gross musculoskeletal defects noted, no " edema  SKIN: no suspicious lesions or rashes  Left Hip mild tenderness   Walks slowly    ER notes reviewed

## 2021-02-03 NOTE — PROGRESS NOTES
Form completed for: Nuria Shelley  What was done with form: Fax form to:  [phone]   Attn: [facility]. Patient was given copy of her PT orders at her 1/27/21 Ortho visit..  Maximo RIVERA

## 2021-02-08 ENCOUNTER — TELEPHONE (OUTPATIENT)
Dept: ORTHOPEDICS | Facility: CLINIC | Age: 81
End: 2021-02-08

## 2021-02-08 NOTE — TELEPHONE ENCOUNTER
Reason for Call:  Home Health Care    Poli with [facility] called regarding (reason for call): PT orders    Orders are needed for this patient. PT     PT:   twice a week for 2 weeks  Once a week for 3 weeks    Would also like to know what restrictions she has For Exercise.     OT:     Skilled Nursing:     Pt Provider: Dr. Naranjo    Phone Number Homecare Nurse can be reached at: [phone]    Can we leave a detailed message on this number? YES    Phone number patient can be reached at:     Best Time:     Call taken on 2/8/2021 at 4:50 PM by Mena Lopez

## 2021-02-09 NOTE — TELEPHONE ENCOUNTER
Advised [facility] that Dr Naranjo would add VORB order restrictions for her home PT : NO ADDuction or flex > 90* to mitigate risk for dislocation .  No other questions or concerns.

## 2021-02-11 ENCOUNTER — TELEPHONE (OUTPATIENT)
Dept: FAMILY MEDICINE | Facility: CLINIC | Age: 81
End: 2021-02-11

## 2021-02-11 ENCOUNTER — TELEPHONE (OUTPATIENT)
Dept: ORTHOPEDICS | Facility: CLINIC | Age: 81
End: 2021-02-11

## 2021-02-11 NOTE — TELEPHONE ENCOUNTER
Called [facility] and informed to start therapy next week once a week for 4 weeks.  Angelina Brandt CMA CMA 2/11/2021 4:07 PM

## 2021-02-11 NOTE — TELEPHONE ENCOUNTER
Kimberlyn from [facility] saw patient at home today for OT  needs orders for  Starting next week for 1x week for 4 weeks please call Kimberlyn at [phone] may leave orders on phone if get voice mail.  Amy Gaona,

## 2021-02-15 ENCOUNTER — MEDICAL CORRESPONDENCE (OUTPATIENT)
Dept: HEALTH INFORMATION MANAGEMENT | Facility: CLINIC | Age: 81
End: 2021-02-15

## 2021-02-17 ENCOUNTER — TELEPHONE (OUTPATIENT)
Dept: FAMILY MEDICINE | Facility: CLINIC | Age: 81
End: 2021-02-17

## 2021-02-17 NOTE — TELEPHONE ENCOUNTER
Nuria Shelley was identified as being non-adherent to his/her simvastatin (medication name) in the last 30-90 days.  Reason(s) identified for non-adherence: other I believe she is just forgetting to take it regularly.   Intervention(s) offered to assist patient with adherence:  Other : Check labs at next appt.     Recommendation to Provider: Check labs  Recommended follow-up: Continue to stress importance of taking simvastatin.   Completed by: Thank you,  Jerri Gonzalez, PharmD  [facility]  [phone]

## 2021-02-18 DIAGNOSIS — R09.81 CHRONIC NASAL CONGESTION: ICD-10-CM

## 2021-02-18 RX ORDER — FLUTICASONE PROPIONATE 50 MCG
SPRAY, SUSPENSION (ML) NASAL
Qty: 16 G | Refills: 5 | Status: SHIPPED | OUTPATIENT
Start: 2021-02-18 | End: 2021-01-01

## 2021-02-24 ENCOUNTER — IMMUNIZATION (OUTPATIENT)
Dept: NURSING | Facility: CLINIC | Age: 81
End: 2021-02-24
Payer: COMMERCIAL

## 2021-02-24 PROCEDURE — 0001A PR COVID VAC PFIZER DIL RECON 30 MCG/0.3 ML IM: CPT

## 2021-02-24 PROCEDURE — 91300 PR COVID VAC PFIZER DIL RECON 30 MCG/0.3 ML IM: CPT

## 2021-03-17 ENCOUNTER — IMMUNIZATION (OUTPATIENT)
Dept: NURSING | Facility: CLINIC | Age: 81
End: 2021-03-17
Attending: FAMILY MEDICINE
Payer: COMMERCIAL

## 2021-03-17 PROCEDURE — 91300 PR COVID VAC PFIZER DIL RECON 30 MCG/0.3 ML IM: CPT

## 2021-03-17 PROCEDURE — 0002A PR COVID VAC PFIZER DIL RECON 30 MCG/0.3 ML IM: CPT

## 2021-03-23 ENCOUNTER — TRANSFERRED RECORDS (OUTPATIENT)
Dept: HEALTH INFORMATION MANAGEMENT | Facility: CLINIC | Age: 81
End: 2021-03-23

## 2021-03-29 ENCOUNTER — MEDICAL CORRESPONDENCE (OUTPATIENT)
Dept: HEALTH INFORMATION MANAGEMENT | Facility: CLINIC | Age: 81
End: 2021-03-29

## 2021-04-06 ENCOUNTER — TRANSFERRED RECORDS (OUTPATIENT)
Dept: HEALTH INFORMATION MANAGEMENT | Facility: CLINIC | Age: 81
End: 2021-04-06

## 2021-04-11 ENCOUNTER — HEALTH MAINTENANCE LETTER (OUTPATIENT)
Age: 81
End: 2021-04-11

## 2021-05-05 ENCOUNTER — OFFICE VISIT (OUTPATIENT)
Dept: ORTHOPEDICS | Facility: CLINIC | Age: 81
End: 2021-05-05
Payer: COMMERCIAL

## 2021-05-05 VITALS
HEIGHT: 64 IN | DIASTOLIC BLOOD PRESSURE: 80 MMHG | WEIGHT: 147 LBS | BODY MASS INDEX: 25.1 KG/M2 | HEART RATE: 88 BPM | SYSTOLIC BLOOD PRESSURE: 153 MMHG

## 2021-05-05 DIAGNOSIS — T84.59XA: ICD-10-CM

## 2021-05-05 DIAGNOSIS — Z96.649 PAIN IN HIP REGION AFTER HIP REPLACEMENT: Primary | ICD-10-CM

## 2021-05-05 DIAGNOSIS — Z96.649: ICD-10-CM

## 2021-05-05 DIAGNOSIS — M25.559 PAIN IN HIP REGION AFTER HIP REPLACEMENT: Primary | ICD-10-CM

## 2021-05-05 DIAGNOSIS — S72.112A DISPLACED FRACTURE OF GREATER TROCHANTER OF LEFT FEMUR, INITIAL ENCOUNTER FOR CLOSED FRACTURE (H): ICD-10-CM

## 2021-05-05 PROCEDURE — 99213 OFFICE O/P EST LOW 20 MIN: CPT | Performed by: ORTHOPAEDIC SURGERY

## 2021-05-05 RX ORDER — OXYCODONE HYDROCHLORIDE 5 MG/1
1-2 TABLET ORAL
COMMUNITY
Start: 2019-11-26 | End: 2022-01-01

## 2021-05-05 ASSESSMENT — MIFFLIN-ST. JEOR: SCORE: 1106.79

## 2021-05-05 ASSESSMENT — PAIN SCALES - GENERAL: PAINLEVEL: MILD PAIN (3)

## 2021-05-05 NOTE — LETTER
"    5/5/2021         RE: Nuria Shelley  [address] [facility] [address]        Dear Colleague,    Thank you for referring your patient, Nuria Shelley, to the [facility] [place]. Please see a copy of my visit note below.    Chief Complaint:   Chief Complaint   Patient presents with     Left Leg - Pain     Hx of left melanie with Dr. Barillas 2018. Long hx at [place]. Seen Dr. Naranjo 1/27/21. Patient notes the bottom of her leg is numb. The last year her hip has been popping out all the time, per daughter. She is having problems with mobility. She wears     Hip Pain     a hip brace. She is wondering what her options are other than surgery. Hip is very painful. Pain is posterior and in the groin area.        HISTORY OF PRESENT ILLNESS    Nuria Shelley is a 81 year old female seen for evaluation of ongoing left hip pain, numbness in the leg. Daughter notes hip has popping in/out \"all the time\", but not since wearing a brace since 12/2020. Having problems with mobility. She's wearing a hip brace. Has pain in the groin, and buttock areas. She's not interested in surgery and wonders what her options are. Did Physical Therapy a couple months ago. Has been taking occasional oxycodone, didn't help yesterday but has some lorazepam which helped more.    Complex medical history. Seen by Dr. Citlaly Naranjo for the same 1/2021. On oxycodone for pain.    From her consultation with Dr. Citlaly Naranjo 1/27/2021: \" In June 2018 she sustained a left femoral neck fracture.  For this she underwent left hip hemiarthroplasty by Dr. Barillas at [facility].  She fell a month later sustaining a widely displaced greater trochanter fracture.  She subsequently developed an MRSA infection in the hip 3 months later.  Because of her advanced metastatic cancer a two-stage procedure was felt to be not survivable and instead opted for irrigation and debridement with chronic antibiotic suppression. In 2019 she fell " "and sustained a nondisplaced pelvic fracture.   She has also had seven dislocations of her hip prosthesis over the past 1 year.  the last dislocation appears to have been 2020.  All the reductions appear to be performed in the ER at [facility].\"        Other PMH:  has a past medical history of Allergic rhinitis, Back strain, Bone metastases (H), Breast cancer (H) (), Constipation, COPD (chronic obstructive pulmonary disease) (H), Depression, Diverticulosis (2013), GERD (gastroesophageal reflux disease), IBS (irritable bowel syndrome), Malignant pleural effusion, Obesity, Urinary incontinence (2010), Urinary stress incontinence, and Wrist tendonitis.  Patient Active Problem List   Diagnosis     IBS (irritable bowel syndrome)     GERD (gastroesophageal reflux disease)     Urinary incontinence     Major depressive disorder, recurrent episode, mild (H)     DUPUYTREN'S CONTRACTURE - right     Hyperlipidemia LDL goal <130     Advanced directives, counseling/discussion     Diverticulosis     COPD (chronic obstructive pulmonary disease) (H)     Chronic nasal congestion     Malignant neoplasm of right female breast, unspecified site of breast     Bone metastasis (H)     Fracture of hip, left, closed, initial encounter (H)     Metastatic breast cancer (H)       Surgical Hx:  has a past surgical history that includes hysterectomy, cervix status unknown ();  DELIVERY ONLY (); bunionectomy rt/lt; EXCISION BREAST LESION, OPEN >=1; surgical history of - ; surgical history of -  (2005); and breast lumpectomy, rt/lt ().    Medications:   Current Outpatient Medications:      albuterol (VENTOLIN HFA) 108 (90 Base) MCG/ACT inhaler, INHALE 2 PUFFS INTO THE LUNGS EVERY 6 HOURS AS NEEDED FOR SHORTNESS OF BREATH/DYSPNEA, Disp: 18 g, Rfl: 12     anastrozole (ARIMIDEX) 1 MG tablet, Take 1 mg by mouth daily, Disp: , Rfl:      aspirin 325 MG EC tablet, Take 325 mg by mouth daily, Disp: , Rfl:      " Calcium-Vitamin D (CALCIUM + D PO), Take  by mouth. 1200 mg calcium daily with vitamin D 1000 units daily, Disp: , Rfl:      diclofenac (VOLTAREN) 75 MG EC tablet, TAKE ONE TABLET BY MOUTH TWICE A DAY, Disp: 180 tablet, Rfl: 0     fexofenadine (ALLEGRA) 60 MG tablet, Take 1 tablet (60 mg) by mouth 2 times daily, Disp: 180 tablet, Rfl: 0     FLUoxetine (PROZAC) 10 MG capsule, Take 1 capsule (10 mg) by mouth daily, Disp: 90 capsule, Rfl: 1     fluticasone (FLONASE) 50 MCG/ACT nasal spray, USE ONE TO TWO SPRAYS INTO EACH NOSTRIL ONCE DAILY, Disp: 16 g, Rfl: 5     fluticasone-salmeterol (ADVAIR DISKUS) 100-50 MCG/DOSE diskus inhaler, Inhale 1 puff into the lungs two times daily, Disp: , Rfl:      HYDROcodone-acetaminophen (NORCO) 5-325 MG tablet, Take 1 tablet by mouth daily, Disp: 25 tablet, Rfl: 0     minocycline (MINOCIN/DYNACIN) 100 MG capsule, TK 1 C PO BID, Disp: , Rfl: 0     Multiple Vitamin (MULTI-VITAMIN PO), Take  by mouth., Disp: , Rfl:      nystatin (MYCOSTATIN) 252173 UNIT/GM external powder, Apply topically 3 times daily as needed, Disp: 60 g, Rfl: 1     olopatadine (PATANOL) 0.1 % ophthalmic solution, Place 1 drop into both eyes 2 times daily., Disp: 1 mL, Rfl: 3     polyethylene glycol (MIRALAX/GLYCOLAX) Packet, Take 1 packet by mouth daily, Disp: , Rfl:      rifampin (RIFADIN) 300 MG capsule, TK 1 C PO BID BEFORE MEALS FOR 90 DAYS, Disp: , Rfl: 0     senna-docusate (SENOKOT-S/PERICOLACE) 8.6-50 MG tablet, Take 1 tablet by mouth two times daily, Disp: 60 tablet, Rfl: 11     simvastatin (ZOCOR) 20 MG tablet, TAKE ONE TABLET BY MOUTH EVERY DAY AT BEDTIME, Disp: 90 tablet, Rfl: 1    Allergies:   Allergies   Allergen Reactions     Nickel Rash     Dust Mite Extract      Erythromycin      unknown     No Clinical Screening - See Comments Hives     Penicillins Hives     Sulfa Drugs Hives     Macrobid [Nitrofuran Derivatives] Rash     Nitrofurantoin Rash     Quinolones Rash       Social Hx: retired.  reports that  "she quit smoking about 41 years ago. Her smoking use included cigarettes. She quit after 20.00 years of use. She has never used smokeless tobacco. She reports current alcohol use. She reports that she does not use drugs.    Family Hx: family history includes Alzheimer Disease in her brother; Breast Cancer in her daughter; Cancer in her brother; Cerebrovascular Disease in her father; Diabetes in her brother, brother, mother, and sister; Heart Disease in her brother, brother, maternal grandmother, mother, and sister.    REVIEW OF SYSTEMS:  10 point ROS neg other than the symptoms noted above in the HPI and PAST MEDICAL HISTORY. Notables,  CONSTITUTIONAL:NEGATIVE for fever, chills, change in weight  INTEGUMENTARY/SKIN: NEGATIVE for worrisome rashes, moles or lesions  MUSCULOSKELETAL:See HPI above  NEURO: NEGATIVE for weakness, dizziness or paresthesias    PHYSICAL EXAM:  BP (!) 153/80   Pulse 88   Ht 1.626 m (5' 4\")   Wt 66.7 kg (147 lb)   BMI 25.23 kg/m     GENERAL APPEARANCE: elderly, alert, no distress; accompanied by her daughter.  SKIN: no suspicious lesions or rashes  NEURO: Normal strength and tone, mentation intact and speech normal  PSYCH:  mentation appears normal and affect normal  RESPIRATORY: No increased work of breathing.  LYMPH: no palpable inguinal lymph nodes.    BILATERAL LOWER EXTREMITIES:  Sitting with legs crossed.  No gross deformities or masses.  Bilateral Quad atrophy, strength weak.  Intact sensation deep peroneal nerve, superficial peroneal nerve, med/lat tibial nerve, sural nerve, saphenous nerve  Intact EHL, EDL, TA, FHL, GS, quadriceps hamstrings and hip flexors  Toes warm and well perfused, brisk capillary refill. Palpable 2+ dp pulses.  Bilateral calf soft and nttp or squeeze.    LEFT HIP EXAM:      Palpation: Tender:  Diffuse low back, buttock, greater trochanter   Nontender: thigh, leg.  Strength:  4/5  Special tests:  Irritability (flexion/adduction/internal rotation) " negative.  Hip range of motion: normal, all pain free        X-RAY: AP/Lateral views left hip from 7/26/2019 were reviewed in clinic today.  There is a comminuted fracture of the greater trochanter. A  large 38 x 48 x 49 mm greater trochanter fracture fragment is  displaced 53 mm superiorly and laterally. Since there are no prior  studies for comparison, this is age-indeterminate. Bipolar left hip  hemiarthroplasty without evidence of hardware loosening. Otherwise  negative.      Impression: 80yo female with chronic left painful hip hemiarthroplasty, recurrent dislocations, chronic displaced greater trochanter fracture, chronic infection.    Plan:   * unfortunate situation with fracture, instability, chronic infection  * instability likely to displaced trochanter fracture   * without surgery I don't think there is much more to offer other than pain control, mobility as tolerated, Physical Therapy and home exercise program.  * seems like she's had more relief from lorazepam, at least yesterday, but cautioned use of both oxycodone and lorazepam. Recommend discussing with primary care provider regarding use of the various medications, especially given age.  * return to clinic as needed.    Claudio Davis M.D., M.S.  Dept. of Orthopaedic Surgery  [facility]              Again, thank you for allowing me to participate in the care of your patient.        Sincerely,        Claudio Davis MD

## 2021-05-05 NOTE — PROGRESS NOTES
"Chief Complaint:   Chief Complaint   Patient presents with     Left Leg - Pain     Hx of left melanie with Dr. Barillas 2018. Long hx at [place]. Seen Dr. Naranjo 1/27/21. Patient notes the bottom of her leg is numb. The last year her hip has been popping out all the time, per daughter. She is having problems with mobility. She wears     Hip Pain     a hip brace. She is wondering what her options are other than surgery. Hip is very painful. Pain is posterior and in the groin area.        HISTORY OF PRESENT ILLNESS    Nuria Shelley is a 81 year old female seen for evaluation of ongoing left hip pain, numbness in the leg. Daughter notes hip has popping in/out \"all the time\", but not since wearing a brace since 12/2020. Having problems with mobility. She's wearing a hip brace. Has pain in the groin, and buttock areas. She's not interested in surgery and wonders what her options are. Did Physical Therapy a couple months ago. Has been taking occasional oxycodone, didn't help yesterday but has some lorazepam which helped more.    Complex medical history. Seen by Dr. Citlaly Naranjo for the same 1/2021. On oxycodone for pain.    From her consultation with Dr. Citlaly Naranjo 1/27/2021: \" In June 2018 she sustained a left femoral neck fracture.  For this she underwent left hip hemiarthroplasty by Dr. Barillas at [facility].  She fell a month later sustaining a widely displaced greater trochanter fracture.  She subsequently developed an MRSA infection in the hip 3 months later.  Because of her advanced metastatic cancer a two-stage procedure was felt to be not survivable and instead opted for irrigation and debridement with chronic antibiotic suppression. In 2019 she fell and sustained a nondisplaced pelvic fracture.   She has also had seven dislocations of her hip prosthesis over the past 1 year.  the last dislocation appears to have been 12/19/2020.  All the reductions appear to be performed in the ER at [place] [facility]        Other PMH:  has a past medical history of Allergic rhinitis, Back strain, Bone metastases (H), Breast cancer (H) (), Constipation, COPD (chronic obstructive pulmonary disease) (H), Depression, Diverticulosis (2013), GERD (gastroesophageal reflux disease), IBS (irritable bowel syndrome), Malignant pleural effusion, Obesity, Urinary incontinence (2010), Urinary stress incontinence, and Wrist tendonitis.  Patient Active Problem List   Diagnosis     IBS (irritable bowel syndrome)     GERD (gastroesophageal reflux disease)     Urinary incontinence     Major depressive disorder, recurrent episode, mild (H)     DUPUYTREN'S CONTRACTURE - right     Hyperlipidemia LDL goal <130     Advanced directives, counseling/discussion     Diverticulosis     COPD (chronic obstructive pulmonary disease) (H)     Chronic nasal congestion     Malignant neoplasm of right female breast, unspecified site of breast     Bone metastasis (H)     Fracture of hip, left, closed, initial encounter (H)     Metastatic breast cancer (H)       Surgical Hx:  has a past surgical history that includes hysterectomy, cervix status unknown ();  DELIVERY ONLY (); bunionectomy rt/lt; EXCISION BREAST LESION, OPEN >=1; surgical history of - ; surgical history of -  (2005); and breast lumpectomy, rt/lt ().    Medications:   Current Outpatient Medications:      albuterol (VENTOLIN HFA) 108 (90 Base) MCG/ACT inhaler, INHALE 2 PUFFS INTO THE LUNGS EVERY 6 HOURS AS NEEDED FOR SHORTNESS OF BREATH/DYSPNEA, Disp: 18 g, Rfl: 12     anastrozole (ARIMIDEX) 1 MG tablet, Take 1 mg by mouth daily, Disp: , Rfl:      aspirin 325 MG EC tablet, Take 325 mg by mouth daily, Disp: , Rfl:      Calcium-Vitamin D (CALCIUM + D PO), Take  by mouth. 1200 mg calcium daily with vitamin D 1000 units daily, Disp: , Rfl:      diclofenac (VOLTAREN) 75 MG EC tablet, TAKE ONE TABLET BY MOUTH TWICE A DAY, Disp: 180 tablet, Rfl: 0     fexofenadine " (ALLEGRA) 60 MG tablet, Take 1 tablet (60 mg) by mouth 2 times daily, Disp: 180 tablet, Rfl: 0     FLUoxetine (PROZAC) 10 MG capsule, Take 1 capsule (10 mg) by mouth daily, Disp: 90 capsule, Rfl: 1     fluticasone (FLONASE) 50 MCG/ACT nasal spray, USE ONE TO TWO SPRAYS INTO EACH NOSTRIL ONCE DAILY, Disp: 16 g, Rfl: 5     fluticasone-salmeterol (ADVAIR DISKUS) 100-50 MCG/DOSE diskus inhaler, Inhale 1 puff into the lungs two times daily, Disp: , Rfl:      HYDROcodone-acetaminophen (NORCO) 5-325 MG tablet, Take 1 tablet by mouth daily, Disp: 25 tablet, Rfl: 0     minocycline (MINOCIN/DYNACIN) 100 MG capsule, TK 1 C PO BID, Disp: , Rfl: 0     Multiple Vitamin (MULTI-VITAMIN PO), Take  by mouth., Disp: , Rfl:      nystatin (MYCOSTATIN) 139787 UNIT/GM external powder, Apply topically 3 times daily as needed, Disp: 60 g, Rfl: 1     olopatadine (PATANOL) 0.1 % ophthalmic solution, Place 1 drop into both eyes 2 times daily., Disp: 1 mL, Rfl: 3     polyethylene glycol (MIRALAX/GLYCOLAX) Packet, Take 1 packet by mouth daily, Disp: , Rfl:      rifampin (RIFADIN) 300 MG capsule, TK 1 C PO BID BEFORE MEALS FOR 90 DAYS, Disp: , Rfl: 0     senna-docusate (SENOKOT-S/PERICOLACE) 8.6-50 MG tablet, Take 1 tablet by mouth two times daily, Disp: 60 tablet, Rfl: 11     simvastatin (ZOCOR) 20 MG tablet, TAKE ONE TABLET BY MOUTH EVERY DAY AT BEDTIME, Disp: 90 tablet, Rfl: 1    Allergies:   Allergies   Allergen Reactions     Nickel Rash     Dust Mite Extract      Erythromycin      unknown     No Clinical Screening - See Comments Hives     Penicillins Hives     Sulfa Drugs Hives     Macrobid [Nitrofuran Derivatives] Rash     Nitrofurantoin Rash     Quinolones Rash       Social Hx: retired.  reports that she quit smoking about 41 years ago. Her smoking use included cigarettes. She quit after 20.00 years of use. She has never used smokeless tobacco. She reports current alcohol use. She reports that she does not use drugs.    Family Hx: family  "history includes Alzheimer Disease in her brother; Breast Cancer in her daughter; Cancer in her brother; Cerebrovascular Disease in her father; Diabetes in her brother, brother, mother, and sister; Heart Disease in her brother, brother, maternal grandmother, mother, and sister.    REVIEW OF SYSTEMS:  10 point ROS neg other than the symptoms noted above in the HPI and PAST MEDICAL HISTORY. Notables,  CONSTITUTIONAL:NEGATIVE for fever, chills, change in weight  INTEGUMENTARY/SKIN: NEGATIVE for worrisome rashes, moles or lesions  MUSCULOSKELETAL:See HPI above  NEURO: NEGATIVE for weakness, dizziness or paresthesias    PHYSICAL EXAM:  BP (!) 153/80   Pulse 88   Ht 1.626 m (5' 4\")   Wt 66.7 kg (147 lb)   BMI 25.23 kg/m     GENERAL APPEARANCE: elderly, alert, no distress; accompanied by her daughter.  SKIN: no suspicious lesions or rashes  NEURO: Normal strength and tone, mentation intact and speech normal  PSYCH:  mentation appears normal and affect normal  RESPIRATORY: No increased work of breathing.  LYMPH: no palpable inguinal lymph nodes.    BILATERAL LOWER EXTREMITIES:  Sitting with legs crossed.  No gross deformities or masses.  Bilateral Quad atrophy, strength weak.  Intact sensation deep peroneal nerve, superficial peroneal nerve, med/lat tibial nerve, sural nerve, saphenous nerve  Intact EHL, EDL, TA, FHL, GS, quadriceps hamstrings and hip flexors  Toes warm and well perfused, brisk capillary refill. Palpable 2+ dp pulses.  Bilateral calf soft and nttp or squeeze.    LEFT HIP EXAM:      Palpation: Tender:  Diffuse low back, buttock, greater trochanter   Nontender: thigh, leg.  Strength:  4/5  Special tests:  Irritability (flexion/adduction/internal rotation) negative.  Hip range of motion: normal, all pain free        X-RAY: AP/Lateral views left hip from 7/26/2019 were reviewed in clinic today.  There is a comminuted fracture of the greater trochanter. A  large 38 x 48 x 49 mm greater trochanter fracture " fragment is  displaced 53 mm superiorly and laterally. Since there are no prior  studies for comparison, this is age-indeterminate. Bipolar left hip  hemiarthroplasty without evidence of hardware loosening. Otherwise  negative.      Impression: 82yo female with chronic left painful hip hemiarthroplasty, recurrent dislocations, chronic displaced greater trochanter fracture, chronic infection.    Plan:   * unfortunate situation with fracture, instability, chronic infection  * instability likely to displaced trochanter fracture   * without surgery I don't think there is much more to offer other than pain control, mobility as tolerated, Physical Therapy and home exercise program.  * seems like she's had more relief from lorazepam, at least yesterday, but cautioned use of both oxycodone and lorazepam. Recommend discussing with primary care provider regarding use of the various medications, especially given age.  * return to clinic as needed.    Claudio Davis M.D., M.S.  Dept. of Orthopaedic Surgery  [facility]

## 2021-05-06 DIAGNOSIS — F33.0 MAJOR DEPRESSIVE DISORDER, RECURRENT EPISODE, MILD (H): ICD-10-CM

## 2021-05-06 NOTE — LETTER
May 10, 2021      Nuria Shelley  [address] [place] [id number]            Your provider has sent a 90 day sekou refill of FLUoxetine (PROZAC) 10 MG capsule. You are due for an appointment for further refills. Please contact the clinic to schedule an appointment for further refills.      Sincerely,       [facility] Stone / [facility]

## 2021-05-10 RX ORDER — FLUOXETINE 10 MG/1
10 CAPSULE ORAL DAILY
Qty: 90 CAPSULE | Refills: 0 | Status: SHIPPED | OUTPATIENT
Start: 2021-05-10 | End: 2022-01-01

## 2021-05-10 NOTE — TELEPHONE ENCOUNTER
Routing refill request to provider for review/approval because:  PHQ-9 score:    PHQ 1/27/2021   PHQ-9 Total Score 10   Q9: Thoughts of better off dead/self-harm past 2 weeks Not at all

## 2021-05-17 ENCOUNTER — DOCUMENTATION ONLY (OUTPATIENT)
Dept: LAB | Facility: CLINIC | Age: 81
End: 2021-05-17

## 2021-05-17 NOTE — PROGRESS NOTES
"    Assessment & Plan     Urinary urgency  Advised try oxybutnin  SEE EPIC care orders  The potential side effects of this medication have been discussed with the patient.  Call if any significant problems with these are experienced.  Follow up if not better  - *UA reflex to Microscopic and Culture ([place] and [facility] (except [place] and [place])  - oxybutynin ER (DITROPAN-XL) 5 MG 24 hr tablet; Take 1 tablet (5 mg) by mouth daily    Allergic rhinitis due to pollen, unspecified seasonality  Advised Flonase and claritin otc  Anxiety  Wants celexa 20 mg  Doing well  Pt however is on Prozac -will call and confirm  BMI:   Estimated body mass index is 26.09 kg/m  as calculated from the following:    Height as of this encounter: 1.626 m (5' 4\").    Weight as of this encounter: 68.9 kg (152 lb).       Return in about 6 months (around 11/19/2021) for Medicare wellness Exam.    Betty Ham MD  [facility] REBECCA Quiñones is a 81 year old who presents for the following health issues   Allergies  Onset/Duration: a long while  Symptoms:   Nasal congestion: no  Sneezing: YES  Red, itchy eyes: YES  Progression of Symptoms: worse  Accompanying Signs & Symptoms:  Cough: no  Wheezing: no  Rash: no  Sinus/facial pain: no  History:   Is it seasonal: all year long   History of Asthma: no, COPD  Has allergy testing been done: a long time ago  Precipitating factors:   None  Alleviating factors:  None  Therapies tried and outcome: Allegra      Has Urinary urgency-chronic  No back pain, no fever or chills  Has had UTI in the past    Review of Systems   CONSTITUTIONAL: NEGATIVE for fever, chills, change in weight  ENT/MOUTH: as above  RESP: NEGATIVE for significant cough or SOB  CV: NEGATIVE for chest pain, palpitations or peripheral edema  GI: NEGATIVE for nausea, abdominal pain, heartburn, or change in bowel habits  : as above  PSYCHIATRIC: doing well  She states she is taking celexa 20 mg and " "doing well  She increased dose on her own      Objective    /75   Pulse 88   Temp 97.6  F (36.4  C) (Oral)   Resp 16   Ht 1.626 m (5' 4\")   Wt 68.9 kg (152 lb)   SpO2 94%   BMI 26.09 kg/m    Body mass index is 26.09 kg/m .  Physical Exam   GENERAL:  alert and no distress  RESP: lungs clear to auscultation - no rales, rhonchi or wheezes  CV: regular rate and rhythm, normal S1 S2, no S3 or S4, no murmur, click or rub, no peripheral edema and peripheral pulses strong  ABDOMEN: soft, nontender, no hepatosplenomegaly, no masses and bowel sounds normal  No flank tenderness   MS: no gross musculoskeletal defects noted, no edema  SKIN: no suspicious lesions or rashes  PSYCH: mentation appears normal  Gait-walks slowly    Results for orders placed or performed in visit on 05/19/21   *UA reflex to Microscopic and Culture ([place] and [facility] (except [place] and [place])     Status: Abnormal    Specimen: Midstream Urine   Result Value Ref Range    Color Urine Yellow     Appearance Urine Clear     Glucose Urine Negative NEG^Negative mg/dL    Bilirubin Urine Negative NEG^Negative    Ketones Urine Trace (A) NEG^Negative mg/dL    Specific Gravity Urine 1.020 1.003 - 1.035    Blood Urine Negative NEG^Negative    pH Urine 6.5 5.0 - 7.0 pH    Protein Albumin Urine Negative NEG^Negative mg/dL    Urobilinogen Urine 0.2 0.2 - 1.0 EU/dL    Nitrite Urine Negative NEG^Negative    Leukocyte Esterase Urine Negative NEG^Negative    Source Midstream Urine                "

## 2021-05-18 ENCOUNTER — DOCUMENTATION ONLY (OUTPATIENT)
Dept: LAB | Facility: CLINIC | Age: 81
End: 2021-05-18

## 2021-05-18 NOTE — PROGRESS NOTES
Please call-she had Lipid done in January-she is not due for any labs yet  She is due for medicare wellness exam

## 2021-05-18 NOTE — PROGRESS NOTES
Patient is requesting to have a cholesterol recheck test done today 5/18/21. Thank you, [facility]

## 2021-05-18 NOTE — PROGRESS NOTES
Called Nuria and pt already has appointment 5/19/21 at 2pm that daughter scheduled. Nuria found test results on previous paperwork. Pt also states that during appointment she would like to discuss her antidepressant.  Carie Warren-  [place]

## 2021-05-19 ENCOUNTER — TELEPHONE (OUTPATIENT)
Dept: FAMILY MEDICINE | Facility: CLINIC | Age: 81
End: 2021-05-19

## 2021-05-19 ENCOUNTER — OFFICE VISIT (OUTPATIENT)
Dept: FAMILY MEDICINE | Facility: CLINIC | Age: 81
End: 2021-05-19
Payer: COMMERCIAL

## 2021-05-19 VITALS
DIASTOLIC BLOOD PRESSURE: 75 MMHG | OXYGEN SATURATION: 94 % | SYSTOLIC BLOOD PRESSURE: 138 MMHG | WEIGHT: 152 LBS | RESPIRATION RATE: 16 BRPM | TEMPERATURE: 97.6 F | HEIGHT: 64 IN | BODY MASS INDEX: 25.95 KG/M2 | HEART RATE: 88 BPM

## 2021-05-19 DIAGNOSIS — R39.15 URINARY URGENCY: Primary | ICD-10-CM

## 2021-05-19 DIAGNOSIS — F33.0 MAJOR DEPRESSIVE DISORDER, RECURRENT EPISODE, MILD (H): ICD-10-CM

## 2021-05-19 DIAGNOSIS — J30.1 ALLERGIC RHINITIS DUE TO POLLEN, UNSPECIFIED SEASONALITY: ICD-10-CM

## 2021-05-19 LAB
ALBUMIN UR-MCNC: NEGATIVE MG/DL
APPEARANCE UR: CLEAR
BILIRUB UR QL STRIP: NEGATIVE
COLOR UR AUTO: YELLOW
GLUCOSE UR STRIP-MCNC: NEGATIVE MG/DL
HGB UR QL STRIP: NEGATIVE
KETONES UR STRIP-MCNC: ABNORMAL MG/DL
LEUKOCYTE ESTERASE UR QL STRIP: NEGATIVE
NITRATE UR QL: NEGATIVE
PH UR STRIP: 6.5 PH (ref 5–7)
SOURCE: ABNORMAL
SP GR UR STRIP: 1.02 (ref 1–1.03)
UROBILINOGEN UR STRIP-ACNC: 0.2 EU/DL (ref 0.2–1)

## 2021-05-19 PROCEDURE — 99213 OFFICE O/P EST LOW 20 MIN: CPT | Performed by: FAMILY MEDICINE

## 2021-05-19 PROCEDURE — 81003 URINALYSIS AUTO W/O SCOPE: CPT | Performed by: FAMILY MEDICINE

## 2021-05-19 RX ORDER — OXYBUTYNIN CHLORIDE 5 MG/1
5 TABLET, EXTENDED RELEASE ORAL DAILY
Qty: 30 TABLET | Refills: 0 | Status: SHIPPED | OUTPATIENT
Start: 2021-05-19 | End: 2021-06-12

## 2021-05-19 ASSESSMENT — MIFFLIN-ST. JEOR: SCORE: 1129.47

## 2021-05-19 NOTE — TELEPHONE ENCOUNTER
Betty Ham MD  P [facility] Rn Triage Pool         Please check with pt   She told me she is taking celexa 20 mg   However we have Given her Prozac   Please call and confirm what dose she is taking

## 2021-05-19 NOTE — Clinical Note
Please check with pt  She told me she is taking celexa 20 mg  However we have Given her Prozac  Please call and confirm what dose she is taking

## 2021-05-20 NOTE — TELEPHONE ENCOUNTER
Routing back to provider    Pt reports only taking prozac.    Miri MENDEZ RN, BSN  [facility]

## 2021-05-21 RX ORDER — FLUOXETINE 10 MG/1
20 CAPSULE ORAL DAILY
Qty: 90 CAPSULE | Refills: 1 | Status: CANCELLED | OUTPATIENT
Start: 2021-05-21

## 2021-05-21 NOTE — TELEPHONE ENCOUNTER
Patient has never been prescription and/or taken the Celexa (Citalopram).    FLUoxetine (PROZAC) 10 MG capsule: patient states that she is taking 20mg daily as it works better, sleeps better with less dreams that wakes her up and cannot go back to sleep. She has been taking the 20 mg for several weeks.

## 2021-06-11 DIAGNOSIS — R39.15 URINARY URGENCY: ICD-10-CM

## 2021-06-12 RX ORDER — OXYBUTYNIN CHLORIDE 5 MG/1
5 TABLET, EXTENDED RELEASE ORAL DAILY
Qty: 30 TABLET | Refills: 1 | Status: SHIPPED | OUTPATIENT
Start: 2021-06-12 | End: 2021-08-23

## 2021-06-15 ENCOUNTER — TRANSFERRED RECORDS (OUTPATIENT)
Dept: HEALTH INFORMATION MANAGEMENT | Facility: CLINIC | Age: 81
End: 2021-06-15

## 2021-06-17 DIAGNOSIS — F33.0 MAJOR DEPRESSIVE DISORDER, RECURRENT EPISODE, MILD (H): ICD-10-CM

## 2021-06-18 NOTE — TELEPHONE ENCOUNTER
Routing refill request to provider for review/approval because:  PHQ    PHQ-9 score:    PHQ 1/27/2021   PHQ-9 Total Score 10   Q9: Thoughts of better off dead/self-harm past 2 weeks Not at all                     Pending Prescriptions:                       Disp   Refills    FLUoxetine (PROZAC) 20 MG capsule [Pharmac*30 cap*0        Sig: Take 1 capsule (20 mg) by mouth daily        Jeff Winston RN

## 2021-07-02 ENCOUNTER — TRANSFERRED RECORDS (OUTPATIENT)
Dept: HEALTH INFORMATION MANAGEMENT | Facility: CLINIC | Age: 81
End: 2021-07-02

## 2021-07-17 DIAGNOSIS — E78.5 HYPERLIPIDEMIA LDL GOAL <130: ICD-10-CM

## 2021-07-17 DIAGNOSIS — F33.0 MAJOR DEPRESSIVE DISORDER, RECURRENT EPISODE, MILD (H): ICD-10-CM

## 2021-07-19 RX ORDER — SIMVASTATIN 20 MG
TABLET ORAL
Qty: 90 TABLET | Refills: 1 | Status: SHIPPED | OUTPATIENT
Start: 2021-07-19 | End: 2022-01-01

## 2021-07-19 NOTE — TELEPHONE ENCOUNTER
"Routing refill request to provider for review/approval because:  PHQ 9 >5    PHQ-9 score:    PHQ 1/27/2021   PHQ-9 Total Score 10   Q9: Thoughts of better off dead/self-harm past 2 weeks Not at all       Requested Prescriptions   Pending Prescriptions Disp Refills     FLUoxetine (PROZAC) 20 MG capsule [Pharmacy Med Name: FLUOXETINE HCL 20MG CAPS] 30 capsule 0     Sig: TAKE 1 CAPSULE (20 MG) BY MOUTH DAILY       SSRIs Protocol Failed - 7/17/2021  5:01 AM        Failed - PHQ-9 score less than 5 in past 6 months     Please review last PHQ-9 score.           Passed - Medication is active on med list        Passed - Patient is age 18 or older        Passed - No active pregnancy on record        Passed - No positive pregnancy test in last 12 months        Passed - Recent (6 mo) or future (30 days) visit within the authorizing provider's specialty     Patient had office visit in the last 6 months or has a visit in the next 30 days with authorizing provider or within the authorizing provider's specialty.  See \"Patient Info\" tab in inbasket, or \"Choose Columns\" in Meds & Orders section of the refill encounter.               Amanda Padilla RN  [facility]    "

## 2021-08-06 ENCOUNTER — TRANSFERRED RECORDS (OUTPATIENT)
Dept: HEALTH INFORMATION MANAGEMENT | Facility: CLINIC | Age: 81
End: 2021-08-06

## 2021-08-16 ENCOUNTER — TRANSFERRED RECORDS (OUTPATIENT)
Dept: HEALTH INFORMATION MANAGEMENT | Facility: CLINIC | Age: 81
End: 2021-08-16

## 2021-08-19 DIAGNOSIS — F33.0 MAJOR DEPRESSIVE DISORDER, RECURRENT EPISODE, MILD (H): ICD-10-CM

## 2021-08-19 DIAGNOSIS — R39.15 URINARY URGENCY: ICD-10-CM

## 2021-08-23 RX ORDER — OXYBUTYNIN CHLORIDE 5 MG/1
5 TABLET, EXTENDED RELEASE ORAL DAILY
Qty: 30 TABLET | Refills: 1 | Status: SHIPPED | OUTPATIENT
Start: 2021-08-23 | End: 2022-01-01

## 2021-08-25 ENCOUNTER — TRANSFERRED RECORDS (OUTPATIENT)
Dept: HEALTH INFORMATION MANAGEMENT | Facility: CLINIC | Age: 81
End: 2021-08-25

## 2021-09-02 ENCOUNTER — TELEPHONE (OUTPATIENT)
Dept: FAMILY MEDICINE | Facility: CLINIC | Age: 81
End: 2021-09-02
Payer: COMMERCIAL

## 2021-09-02 NOTE — TELEPHONE ENCOUNTER
Kirstie with [facility] called asking for clarification on pt's meds. Would like a return call with updates please, [phone]      Meds in question:  - diclofenac (VOLTAREN) 75 MG EC tablet: per chart, pt on 75 mg twice per day. Per Kirstie, oncology had pt at 100 mg twice per day. Please clarify dose and frequency pt should be taking.    - Miralax: on med list as pt to take daily but pt taking PRN, is this ok?    - Simvastatin: per chart, pt taking 20 mg daily but per oncology they had her at 5 mg daily. Please clarify dose pt should be taking.    - pt also added 1000 mg tab, once daily, of Ivette C (vitamin C supplement) and Centrum Silver daily vitamin. Are these ok for pt to continue taking?    Miri MENDEZ RN, BSN  [facility]

## 2021-09-03 ENCOUNTER — TRANSFERRED RECORDS (OUTPATIENT)
Dept: HEALTH INFORMATION MANAGEMENT | Facility: CLINIC | Age: 81
End: 2021-09-03

## 2021-09-03 NOTE — TELEPHONE ENCOUNTER
The patient's PCP is out of the office, but I would continue with the diclofenac at 75 mg twice per day.  It is okay to take the MiraLAX prn.  The simvastatin looks like it should be 20 mg/day.  She can continue with a vitamin C supplement and a Centrum Silver daily vitamin.      Milton Joshi MD  (for Dr. Ham)

## 2021-09-03 NOTE — TELEPHONE ENCOUNTER
Called and spoke with Kirstie and gave updates per Dr. Joshi.     Miri MENDEZ RN, BSN  [facility]

## 2021-09-15 ENCOUNTER — TELEPHONE (OUTPATIENT)
Dept: FAMILY MEDICINE | Facility: CLINIC | Age: 81
End: 2021-09-15

## 2021-09-15 DIAGNOSIS — F33.0 MAJOR DEPRESSIVE DISORDER, RECURRENT EPISODE, MILD (H): ICD-10-CM

## 2021-09-15 NOTE — TELEPHONE ENCOUNTER
Fax to be reviewed by the provider Home Care Plan of Care Certification     Who is the it from? [facility] 9/2/2021 - 10/31/2021  This was faxed to [facility]   Where was the fax placed? Provider's desk to sign  What number is listed as a contact on the fax?     [facility]  Phone [phone] Fax: [phone]    Please fax to above    Additional comments:

## 2021-09-16 DIAGNOSIS — Z53.9 DIAGNOSIS NOT YET DEFINED: Primary | ICD-10-CM

## 2021-09-17 ENCOUNTER — TRANSFERRED RECORDS (OUTPATIENT)
Dept: HEALTH INFORMATION MANAGEMENT | Facility: CLINIC | Age: 81
End: 2021-09-17

## 2021-09-17 NOTE — TELEPHONE ENCOUNTER
The Form has been completed by the provider, confirmed faxed to the fax number on the form and listed below and a copy has been sent to be added to the chart. Charlee Bull,

## 2021-09-21 ENCOUNTER — TRANSFERRED RECORDS (OUTPATIENT)
Dept: HEALTH INFORMATION MANAGEMENT | Facility: CLINIC | Age: 81
End: 2021-09-21

## 2021-09-21 ASSESSMENT — PATIENT HEALTH QUESTIONNAIRE - PHQ9: SUM OF ALL RESPONSES TO PHQ QUESTIONS 1-9: 19

## 2021-09-21 NOTE — TELEPHONE ENCOUNTER
Called and spoke with patient, completed PHQ9.    Routing back to  refill pool    Alpa Washington MA

## 2021-09-21 NOTE — TELEPHONE ENCOUNTER
Routing refill request to provider for review/approval because:  PHQ-9 score:    PHQ 9/21/2021   PHQ-9 Total Score 19   Q9: Thoughts of better off dead/self-harm past 2 weeks Not at all

## 2021-09-22 ENCOUNTER — OFFICE VISIT (OUTPATIENT)
Dept: OPHTHALMOLOGY | Facility: CLINIC | Age: 81
End: 2021-09-22
Payer: COMMERCIAL

## 2021-09-22 DIAGNOSIS — H52.223 REGULAR ASTIGMATISM OF BOTH EYES: ICD-10-CM

## 2021-09-22 DIAGNOSIS — Z01.01 ENCOUNTER FOR EXAMINATION OF EYES AND VISION WITH ABNORMAL FINDINGS: Primary | ICD-10-CM

## 2021-09-22 DIAGNOSIS — H25.813 COMBINED FORM OF AGE-RELATED CATARACT, BOTH EYES: ICD-10-CM

## 2021-09-22 DIAGNOSIS — H52.11 MYOPIA OF RIGHT EYE: ICD-10-CM

## 2021-09-22 DIAGNOSIS — H52.02 HYPEROPIA OF LEFT EYE: ICD-10-CM

## 2021-09-22 DIAGNOSIS — H52.4 PRESBYOPIA OF BOTH EYES: ICD-10-CM

## 2021-09-22 DIAGNOSIS — H40.003 GLAUCOMA SUSPECT OF BOTH EYES: ICD-10-CM

## 2021-09-22 DIAGNOSIS — H43.813 POSTERIOR VITREOUS DETACHMENT OF BOTH EYES: ICD-10-CM

## 2021-09-22 PROBLEM — D72.829 LEUKOCYTOSIS: Status: ACTIVE | Noted: 2018-09-24

## 2021-09-22 PROBLEM — R53.1 WEAKNESS: Status: ACTIVE | Noted: 2020-03-29

## 2021-09-22 PROBLEM — M25.552 HIP PAIN, ACUTE, LEFT: Status: ACTIVE | Noted: 2018-09-24

## 2021-09-22 PROBLEM — A41.9 SEPSIS (H): Status: ACTIVE | Noted: 2018-09-24

## 2021-09-22 PROCEDURE — 92015 DETERMINE REFRACTIVE STATE: CPT | Performed by: STUDENT IN AN ORGANIZED HEALTH CARE EDUCATION/TRAINING PROGRAM

## 2021-09-22 PROCEDURE — 92004 COMPRE OPH EXAM NEW PT 1/>: CPT | Performed by: STUDENT IN AN ORGANIZED HEALTH CARE EDUCATION/TRAINING PROGRAM

## 2021-09-22 ASSESSMENT — REFRACTION_MANIFEST
OD_ADD: +3.00
OS_ADD: +3.00
OD_SPHERE: -0.50
OS_SPHERE: +1.25
OS_CYLINDER: SPHERE
OD_CYLINDER: +1.00
OD_AXIS: 010

## 2021-09-22 ASSESSMENT — EXTERNAL EXAM - RIGHT EYE: OD_EXAM: NORMAL

## 2021-09-22 ASSESSMENT — VISUAL ACUITY
OD_CC: 20/50
METHOD: SNELLEN - LINEAR
OD_CC+: -2
OD_PH_CC: 20/50
OD_PH_CC+: +2
OS_CC+: +2
OS_CC: 20/50
CORRECTION_TYPE: GLASSES
OD_CC: J5
OS_CC: J5

## 2021-09-22 ASSESSMENT — CUP TO DISC RATIO
OS_RATIO: 0.6
OD_RATIO: 0.75

## 2021-09-22 ASSESSMENT — REFRACTION_WEARINGRX
OD_CYLINDER: SPHERE
OS_SPHERE: +1.25
OS_ADD: +2.50
OS_CYLINDER: +0.50
SPECS_TYPE: TRIFOCAL
OD_ADD: +2.50
OD_SPHERE: +1.25
OS_AXIS: 009

## 2021-09-22 ASSESSMENT — CONF VISUAL FIELD
OS_NORMAL: 1
OD_NORMAL: 1

## 2021-09-22 ASSESSMENT — TONOMETRY
OS_IOP_MMHG: 12
OD_IOP_MMHG: 11
IOP_METHOD: APPLANATION

## 2021-09-22 ASSESSMENT — SLIT LAMP EXAM - LIDS
COMMENTS: 2+ DERMATOCHALASIS
COMMENTS: 2+ DERMATOCHALASIS

## 2021-09-22 ASSESSMENT — EXTERNAL EXAM - LEFT EYE: OS_EXAM: NORMAL

## 2021-09-22 NOTE — PROGRESS NOTES
Current Eye Medications:  Artificial tears both eyes as needed.  Centrum Silver.     Subjective:  Comprehensive Eye Exam.  Patient complains of gradual decreasing vision in each eye, especially when reading.  Distance vision appears to be about the same.  Her frames are quite old and need replacing.  Occasionally she wears over-the-counter readers (+3.00), but these are not working as well as they have in the past.    Her daughter is in the waiting room.  Last eye exam:  2018.  No history of eye injuries or surgery.   No family history of glaucoma.      Objective:  See Ophthalmology Exam.      Assessment:  Nuria Shelley is a 81 year old female who presents with:   Encounter Diagnoses   Name Primary?     Encounter for examination of eyes and vision with abnormal findings      Myopia of right eye      Hyperopia of left eye      Regular astigmatism of both eyes      Presbyopia of both eyes        Combined form of age-related cataract, both eyes Patient happy enough with vision at present.       Posterior vitreous detachment of both eyes      Glaucoma suspect of both eyes Intraocular pressure 11/12 with increased C:D right>left. Recommend baseline OCT and visual field.        Plan:  Glasses prescription given    Continue to monitor for glaucoma suspicion      Continue artificial tears up to four times a day as needed     Alberta Valencia MD  [phone]

## 2021-09-22 NOTE — PATIENT INSTRUCTIONS
Glasses prescription given    Continue to monitor for glaucoma suspicion      Continue artificial tears up to four times a day as needed     Alberta Valencia MD  [phone]

## 2021-09-22 NOTE — LETTER
9/22/2021         RE: Nuria Shelley  [address] [place] [facility] [address]        Dear Colleague,    Thank you for referring your patient, Nuria Shelley, to the [facility]. Please see a copy of my visit note below.     Current Eye Medications:  Artificial tears both eyes as needed.  Centrum Silver.     Subjective:  Comprehensive Eye Exam.  Patient complains of gradual decreasing vision in each eye, especially when reading.  Distance vision appears to be about the same.  Her frames are quite old and need replacing.  Occasionally she wears over-the-counter readers (+3.00), but these are not working as well as they have in the past.    Her daughter is in the waiting room.  Last eye exam:  2018.  No history of eye injuries or surgery.   No family history of glaucoma.      Objective:  See Ophthalmology Exam.      Assessment:  Nuria Shelley is a 81 year old female who presents with:   Encounter Diagnoses   Name Primary?     Encounter for examination of eyes and vision with abnormal findings      Myopia of right eye      Hyperopia of left eye      Regular astigmatism of both eyes      Presbyopia of both eyes        Combined form of age-related cataract, both eyes Patient happy enough with vision at present.       Posterior vitreous detachment of both eyes      Glaucoma suspect of both eyes Intraocular pressure 11/12 with increased C:D right>left. Recommend baseline OCT and visual field.        Plan:  Glasses prescription given    Continue to monitor for glaucoma suspicion      Continue artificial tears up to four times a day as needed     Alberta Valencia MD  [phone]            Again, thank you for allowing me to participate in the care of your patient.        Sincerely,        Alberta Valencia MD     English

## 2021-09-26 ENCOUNTER — HEALTH MAINTENANCE LETTER (OUTPATIENT)
Age: 81
End: 2021-09-26

## 2021-09-29 DIAGNOSIS — Z53.9 DIAGNOSIS NOT YET DEFINED: Primary | ICD-10-CM

## 2021-10-01 ENCOUNTER — TELEPHONE (OUTPATIENT)
Dept: FAMILY MEDICINE | Facility: CLINIC | Age: 81
End: 2021-10-01

## 2021-10-01 NOTE — TELEPHONE ENCOUNTER
[facility] calling to get orders  For extension of HC orders per patient request.    Verbal orders given as requested.  Asya verbalized understanding.    Mary Anne Carreon RN  [facility]

## 2021-10-05 ENCOUNTER — MEDICAL CORRESPONDENCE (OUTPATIENT)
Dept: HEALTH INFORMATION MANAGEMENT | Facility: CLINIC | Age: 81
End: 2021-10-05

## 2021-10-11 ENCOUNTER — MEDICAL CORRESPONDENCE (OUTPATIENT)
Dept: HEALTH INFORMATION MANAGEMENT | Facility: CLINIC | Age: 81
End: 2021-10-11

## 2021-10-11 NOTE — TELEPHONE ENCOUNTER
Mattie OT with [facility] care called the clinic requesting verbal orders for home care OT: 1 time X 4 weeks for ADL training, home safety, home exercise program and bladder control.    Called and gave okay for verbal orders for Home care Occupational therapy as requested.    Per [facility] protocol/Policies: Vesta Aldrich, RN for Dr. Ham.  Patient who has bee seen by [facility] primary care provider within the past 2 years (5/19/21)

## 2021-10-20 ENCOUNTER — MEDICAL CORRESPONDENCE (OUTPATIENT)
Dept: HEALTH INFORMATION MANAGEMENT | Facility: CLINIC | Age: 81
End: 2021-10-20
Payer: COMMERCIAL

## 2021-10-21 ENCOUNTER — MEDICAL CORRESPONDENCE (OUTPATIENT)
Dept: HEALTH INFORMATION MANAGEMENT | Facility: CLINIC | Age: 81
End: 2021-10-21

## 2021-10-22 ENCOUNTER — MEDICAL CORRESPONDENCE (OUTPATIENT)
Dept: HEALTH INFORMATION MANAGEMENT | Facility: CLINIC | Age: 81
End: 2021-10-22
Payer: COMMERCIAL

## 2021-10-26 ENCOUNTER — TELEPHONE (OUTPATIENT)
Dept: FAMILY MEDICINE | Facility: CLINIC | Age: 81
End: 2021-10-26

## 2021-10-26 NOTE — TELEPHONE ENCOUNTER
Confirmed faxed back signed copy of the Home Care Certification. A copy has been sent to be added to the chart. Charlee Bull,

## 2021-10-26 NOTE — TELEPHONE ENCOUNTER
Fax to be reviewed by the provider Home Care Plan of Care Certification     Who is the it from? [facility] Care Cert 10/2/2021 - 11/30/2021  This was faxed to [facility]   Where was the fax placed? Provider's desk to sign  What number is listed as a contact on the fax?     [facility]  Phone: [phone] Fax: [phone]    Please fax to above    Additional comments:

## 2021-10-27 ENCOUNTER — MEDICAL CORRESPONDENCE (OUTPATIENT)
Dept: HEALTH INFORMATION MANAGEMENT | Facility: CLINIC | Age: 81
End: 2021-10-27
Payer: COMMERCIAL

## 2021-10-28 ENCOUNTER — MEDICAL CORRESPONDENCE (OUTPATIENT)
Dept: HEALTH INFORMATION MANAGEMENT | Facility: CLINIC | Age: 81
End: 2021-10-28
Payer: COMMERCIAL

## 2021-11-02 ENCOUNTER — MEDICAL CORRESPONDENCE (OUTPATIENT)
Dept: HEALTH INFORMATION MANAGEMENT | Facility: CLINIC | Age: 81
End: 2021-11-02
Payer: COMMERCIAL

## 2021-11-04 ENCOUNTER — MEDICAL CORRESPONDENCE (OUTPATIENT)
Dept: HEALTH INFORMATION MANAGEMENT | Facility: CLINIC | Age: 81
End: 2021-11-04
Payer: COMMERCIAL

## 2021-11-08 ENCOUNTER — TELEPHONE (OUTPATIENT)
Dept: FAMILY MEDICINE | Facility: CLINIC | Age: 81
End: 2021-11-08
Payer: COMMERCIAL

## 2021-11-08 ENCOUNTER — TRANSFERRED RECORDS (OUTPATIENT)
Dept: HEALTH INFORMATION MANAGEMENT | Facility: CLINIC | Age: 81
End: 2021-11-08
Payer: COMMERCIAL

## 2021-11-08 NOTE — TELEPHONE ENCOUNTER
RN called amanda and gave verbal orders for requested OT    Gaston Escobar RN, BSN, PHN  [facility]

## 2021-11-08 NOTE — TELEPHONE ENCOUNTER
Routing to PCP    Ok for verbal orders requested home OT?    Extend OT 1 x a week for 3 weeks  1 visit in three day until end of certification.    Esthela OLSEN from [facility] called requesting orders    Gaston Escobar RN, BSN, PHN  [facility]

## 2021-11-12 ENCOUNTER — MEDICAL CORRESPONDENCE (OUTPATIENT)
Dept: HEALTH INFORMATION MANAGEMENT | Facility: CLINIC | Age: 81
End: 2021-11-12

## 2021-11-15 ENCOUNTER — TRANSFERRED RECORDS (OUTPATIENT)
Dept: HEALTH INFORMATION MANAGEMENT | Facility: CLINIC | Age: 81
End: 2021-11-15
Payer: COMMERCIAL

## 2021-11-17 ENCOUNTER — MEDICAL CORRESPONDENCE (OUTPATIENT)
Dept: HEALTH INFORMATION MANAGEMENT | Facility: CLINIC | Age: 81
End: 2021-11-17
Payer: COMMERCIAL

## 2021-12-20 NOTE — TELEPHONE ENCOUNTER
"Prescription approved per [facility] Refill Protocol.  Requested Prescriptions   Pending Prescriptions Disp Refills     fluticasone (FLONASE) 50 MCG/ACT nasal spray [Pharmacy Med Name: FLUTICASONE PROPIONATE 50 SUSP]  5     Sig: USE ONE TO TWO SPRAYS INTO EACH NOSTRIL ONCE DAILY       Nasal Allergy Protocol Passed - 12/17/2021 10:47 AM        Passed - Patient is age 12 or older        Passed - Recent (12 mo) or future (30 days) visit within the authorizing provider's specialty     Patient has had an office visit with the authorizing provider or a provider within the authorizing providers department within the previous 12 mos or has a future within next 30 days. See \"Patient Info\" tab in inbasket, or \"Choose Columns\" in Meds & Orders section of the refill encounter.              Passed - Medication is active on med list           Katalina Joe RN on 12/20/2021 at 1:25 PM    "

## 2022-01-01 ENCOUNTER — TELEPHONE (OUTPATIENT)
Dept: FAMILY MEDICINE | Facility: CLINIC | Age: 82
End: 2022-01-01

## 2022-01-01 ENCOUNTER — TRANSFERRED RECORDS (OUTPATIENT)
Dept: HEALTH INFORMATION MANAGEMENT | Facility: CLINIC | Age: 82
End: 2022-01-01

## 2022-01-01 ENCOUNTER — TELEPHONE (OUTPATIENT)
Dept: PHARMACY | Facility: CLINIC | Age: 82
End: 2022-01-01

## 2022-01-01 ENCOUNTER — TRANSFERRED RECORDS (OUTPATIENT)
Dept: HEALTH INFORMATION MANAGEMENT | Facility: CLINIC | Age: 82
End: 2022-01-01
Payer: COMMERCIAL

## 2022-01-01 ENCOUNTER — TELEPHONE (OUTPATIENT)
Dept: PHARMACY | Facility: CLINIC | Age: 82
End: 2022-01-01
Payer: COMMERCIAL

## 2022-01-01 ENCOUNTER — TELEPHONE (OUTPATIENT)
Dept: FAMILY MEDICINE | Facility: CLINIC | Age: 82
End: 2022-01-01
Payer: COMMERCIAL

## 2022-01-01 ENCOUNTER — LAB REQUISITION (OUTPATIENT)
Dept: LAB | Facility: CLINIC | Age: 82
End: 2022-01-01
Payer: COMMERCIAL

## 2022-01-01 ENCOUNTER — HEALTH MAINTENANCE LETTER (OUTPATIENT)
Age: 82
End: 2022-01-01

## 2022-01-01 ENCOUNTER — OFFICE VISIT (OUTPATIENT)
Dept: FAMILY MEDICINE | Facility: CLINIC | Age: 82
End: 2022-01-01
Payer: COMMERCIAL

## 2022-01-01 ENCOUNTER — TELEPHONE (OUTPATIENT)
Dept: LAB | Facility: CLINIC | Age: 82
End: 2022-01-01

## 2022-01-01 ENCOUNTER — DOCUMENTATION ONLY (OUTPATIENT)
Dept: OTHER | Facility: CLINIC | Age: 82
End: 2022-01-01
Payer: COMMERCIAL

## 2022-01-01 ENCOUNTER — NURSE TRIAGE (OUTPATIENT)
Dept: FAMILY MEDICINE | Facility: CLINIC | Age: 82
End: 2022-01-01

## 2022-01-01 ENCOUNTER — VIRTUAL VISIT (OUTPATIENT)
Dept: PHARMACY | Facility: CLINIC | Age: 82
End: 2022-01-01
Payer: COMMERCIAL

## 2022-01-01 ENCOUNTER — TELEPHONE (OUTPATIENT)
Dept: PALLIATIVE CARE | Facility: CLINIC | Age: 82
End: 2022-01-01
Payer: COMMERCIAL

## 2022-01-01 ENCOUNTER — NURSE TRIAGE (OUTPATIENT)
Dept: NURSING | Facility: CLINIC | Age: 82
End: 2022-01-01

## 2022-01-01 VITALS
TEMPERATURE: 98.9 F | OXYGEN SATURATION: 96 % | WEIGHT: 154.2 LBS | HEART RATE: 90 BPM | DIASTOLIC BLOOD PRESSURE: 74 MMHG | SYSTOLIC BLOOD PRESSURE: 126 MMHG | BODY MASS INDEX: 26.47 KG/M2

## 2022-01-01 DIAGNOSIS — I10 ESSENTIAL (PRIMARY) HYPERTENSION: ICD-10-CM

## 2022-01-01 DIAGNOSIS — J44.9 CHRONIC OBSTRUCTIVE PULMONARY DISEASE, UNSPECIFIED COPD TYPE (H): ICD-10-CM

## 2022-01-01 DIAGNOSIS — C50.911 MALIGNANT NEOPLASM OF RIGHT FEMALE BREAST, UNSPECIFIED ESTROGEN RECEPTOR STATUS, UNSPECIFIED SITE OF BREAST (H): ICD-10-CM

## 2022-01-01 DIAGNOSIS — E78.2 MIXED HYPERLIPIDEMIA: ICD-10-CM

## 2022-01-01 DIAGNOSIS — R09.81 CHRONIC NASAL CONGESTION: ICD-10-CM

## 2022-01-01 DIAGNOSIS — E78.5 HYPERLIPIDEMIA LDL GOAL <130: ICD-10-CM

## 2022-01-01 DIAGNOSIS — C79.51 BONE METASTASIS: ICD-10-CM

## 2022-01-01 DIAGNOSIS — F33.0 MAJOR DEPRESSIVE DISORDER, RECURRENT EPISODE, MILD (H): ICD-10-CM

## 2022-01-01 DIAGNOSIS — S72.002A FRACTURE OF HIP, LEFT, CLOSED, INITIAL ENCOUNTER (H): ICD-10-CM

## 2022-01-01 DIAGNOSIS — I50.9 HEART FAILURE, UNSPECIFIED (H): ICD-10-CM

## 2022-01-01 DIAGNOSIS — C50.911 MALIGNANT NEOPLASM OF RIGHT FEMALE BREAST, UNSPECIFIED ESTROGEN RECEPTOR STATUS, UNSPECIFIED SITE OF BREAST (H): Primary | ICD-10-CM

## 2022-01-01 DIAGNOSIS — J30.2 SEASONAL ALLERGIC RHINITIS, UNSPECIFIED TRIGGER: ICD-10-CM

## 2022-01-01 DIAGNOSIS — J44.9 CHRONIC OBSTRUCTIVE PULMONARY DISEASE, UNSPECIFIED COPD TYPE (H): Primary | ICD-10-CM

## 2022-01-01 DIAGNOSIS — F33.0 MAJOR DEPRESSIVE DISORDER, RECURRENT EPISODE, MILD (H): Primary | ICD-10-CM

## 2022-01-01 DIAGNOSIS — C50.919 METASTATIC BREAST CANCER: ICD-10-CM

## 2022-01-01 DIAGNOSIS — Z23 HIGH PRIORITY FOR 2019-NCOV VACCINE: ICD-10-CM

## 2022-01-01 DIAGNOSIS — L03.115 CELLULITIS OF RIGHT LOWER LIMB: ICD-10-CM

## 2022-01-01 DIAGNOSIS — I10 ESSENTIAL HYPERTENSION: ICD-10-CM

## 2022-01-01 LAB — HOLD SPECIMEN: NORMAL

## 2022-01-01 PROCEDURE — 99605 MTMS BY PHARM NP 15 MIN: CPT | Performed by: PHARMACIST

## 2022-01-01 PROCEDURE — 99214 OFFICE O/P EST MOD 30 MIN: CPT | Mod: 25 | Performed by: FAMILY MEDICINE

## 2022-01-01 PROCEDURE — P9603 ONE-WAY ALLOW PRORATED MILES: HCPCS | Mod: ORL | Performed by: NURSE PRACTITIONER

## 2022-01-01 PROCEDURE — 0054A COVID-19,PF,PFIZER (12+ YRS): CPT | Performed by: FAMILY MEDICINE

## 2022-01-01 PROCEDURE — 91305 COVID-19,PF,PFIZER (12+ YRS): CPT | Performed by: FAMILY MEDICINE

## 2022-01-01 PROCEDURE — 96127 BRIEF EMOTIONAL/BEHAV ASSMT: CPT | Performed by: FAMILY MEDICINE

## 2022-01-01 PROCEDURE — 99607 MTMS BY PHARM ADDL 15 MIN: CPT | Performed by: PHARMACIST

## 2022-01-01 PROCEDURE — 36415 COLL VENOUS BLD VENIPUNCTURE: CPT | Mod: ORL | Performed by: NURSE PRACTITIONER

## 2022-01-01 RX ORDER — FEXOFENADINE HCL 180 MG/1
180 TABLET ORAL DAILY
COMMUNITY

## 2022-01-01 RX ORDER — BISACODYL 5 MG/1
5 TABLET, DELAYED RELEASE ORAL DAILY
COMMUNITY
End: 2022-01-01

## 2022-01-01 RX ORDER — SIMVASTATIN 20 MG
TABLET ORAL
Qty: 90 TABLET | Refills: 1 | OUTPATIENT
Start: 2022-01-01

## 2022-01-01 RX ORDER — SERTRALINE HYDROCHLORIDE 25 MG/1
25 TABLET, FILM COATED ORAL DAILY
Qty: 30 TABLET | Refills: 0 | Status: SHIPPED | OUTPATIENT
Start: 2022-01-01 | End: 2022-01-01

## 2022-01-01 RX ORDER — ALBUTEROL SULFATE 90 UG/1
AEROSOL, METERED RESPIRATORY (INHALATION)
Qty: 18 G | Refills: 12 | Status: SHIPPED | OUTPATIENT
Start: 2022-01-01

## 2022-01-01 RX ORDER — FLUTICASONE PROPIONATE 50 MCG
SPRAY, SUSPENSION (ML) NASAL
Refills: 0 | OUTPATIENT
Start: 2022-01-01

## 2022-01-01 RX ORDER — FLUTICASONE PROPIONATE 50 MCG
SPRAY, SUSPENSION (ML) NASAL
Qty: 16 G | Refills: 11 | Status: SHIPPED | OUTPATIENT
Start: 2022-01-01

## 2022-01-01 RX ORDER — HYDROXYZINE PAMOATE 25 MG/1
1 CAPSULE ORAL PRN
COMMUNITY
Start: 2021-10-11

## 2022-01-01 RX ORDER — BUPRENORPHINE 10 UG/H
PATCH TRANSDERMAL
COMMUNITY
Start: 2022-01-01

## 2022-01-01 RX ORDER — BUPRENORPHINE 10 UG/H
PATCH TRANSDERMAL
OUTPATIENT
Start: 2022-01-01

## 2022-01-01 RX ORDER — DEXAMETHASONE 4 MG/1
TABLET ORAL
Qty: 60 TABLET | Refills: 1 | OUTPATIENT
Start: 2022-01-01

## 2022-01-01 RX ORDER — FLUTICASONE PROPIONATE 50 MCG
SPRAY, SUSPENSION (ML) NASAL
Qty: 16 G | Refills: 0 | Status: SHIPPED | OUTPATIENT
Start: 2022-01-01 | End: 2022-01-01

## 2022-01-01 RX ORDER — DEXAMETHASONE 4 MG/1
4 TABLET ORAL 2 TIMES DAILY WITH MEALS
Qty: 60 TABLET | Refills: 1 | Status: SHIPPED | OUTPATIENT
Start: 2022-01-01

## 2022-01-01 RX ORDER — TAMOXIFEN CITRATE 20 MG/1
20 TABLET ORAL DAILY
COMMUNITY
Start: 2022-01-01

## 2022-01-01 RX ORDER — SIMVASTATIN 20 MG
TABLET ORAL
Qty: 90 TABLET | Refills: 3 | Status: SHIPPED | OUTPATIENT
Start: 2022-01-01

## 2022-01-01 RX ORDER — DOCUSATE SODIUM 100 MG/1
100 CAPSULE, LIQUID FILLED ORAL 2 TIMES DAILY
COMMUNITY

## 2022-01-01 RX ORDER — OXYCODONE HYDROCHLORIDE 5 MG/1
5 TABLET ORAL DAILY
Qty: 30 TABLET | Refills: 0 | Status: SHIPPED | OUTPATIENT
Start: 2022-01-01

## 2022-01-01 RX ORDER — FLUTICASONE PROPIONATE AND SALMETEROL 100; 50 UG/1; UG/1
1 POWDER RESPIRATORY (INHALATION) EVERY 12 HOURS
Qty: 1 EACH | Refills: 4 | Status: SHIPPED | OUTPATIENT
Start: 2022-01-01

## 2022-01-01 ASSESSMENT — PAIN SCALES - GENERAL: PAINLEVEL: NO PAIN (0)

## 2022-01-01 ASSESSMENT — PATIENT HEALTH QUESTIONNAIRE - PHQ9
10. IF YOU CHECKED OFF ANY PROBLEMS, HOW DIFFICULT HAVE THESE PROBLEMS MADE IT FOR YOU TO DO YOUR WORK, TAKE CARE OF THINGS AT HOME, OR GET ALONG WITH OTHER PEOPLE: SOMEWHAT DIFFICULT
SUM OF ALL RESPONSES TO PHQ QUESTIONS 1-9: 11
SUM OF ALL RESPONSES TO PHQ QUESTIONS 1-9: 11

## 2022-02-25 NOTE — TELEPHONE ENCOUNTER
"Routing refill request to provider for review/approval because:  PHQ 9 > 5    Requested Prescriptions   Pending Prescriptions Disp Refills     FLUoxetine (PROZAC) 20 MG capsule [Pharmacy Med Name: FLUOXETINE HCL 20MG CAPS] 30 capsule 0     Sig: TAKE 1 CAPSULE (20 MG) BY MOUTH ONCE DAILY       SSRIs Protocol Failed - 2/25/2022  2:57 PM        Failed - PHQ-9 score less than 5 in past 6 months     Please review last PHQ-9 score.           Failed - Recent (6 mo) or future (30 days) visit within the authorizing provider's specialty     Patient had office visit in the last 6 months or has a visit in the next 30 days with authorizing provider or within the authorizing provider's specialty.  See \"Patient Info\" tab in inbasket, or \"Choose Columns\" in Meds & Orders section of the refill encounter.            Passed - Medication is active on med list        Passed - Patient is age 18 or older        Passed - No active pregnancy on record        Passed - No positive pregnancy test in last 12 months           Amanda Padilla RN  [facility]          "

## 2022-02-28 NOTE — TELEPHONE ENCOUNTER
Second attempt to call patient, no answer. Left VM and requested pt call back at [phone].     Audelia Sawant RN   [facility]

## 2022-02-28 NOTE — TELEPHONE ENCOUNTER
Dr. Ham,    We received an order for Fluoxetine for Nuria today.  She started Tamoxifen back in late Nov 2021 from an outside provider and the combination of tamoxifen and fluoxetine is not recommended because fluoxetine decreases the effectiveness of tamoxifen.  Sertraline and citalopram are two other SSRIs that do not interact with tamoxifen.  Could you please review and send new order for an alternative medication?      Thank you,  Markell Kaufman PharmD - Float Pharmacist, on behalf of [facility]

## 2022-02-28 NOTE — LETTER
[facility]  [address] [facility] [address]  [phone]          March 7, 2022    Nuria Shelley                                                                                                                 [address] [place] [id number]            Dear Nuria,    We have tried to reach you by phone, but have been unable to do so.    I would like you to stop the prozac due to an interaction with tamoxifen.  I would like you to switch to zoloft 25 mg daily.    Please schedule a follow up visit with me in one month.  This can be a virtual visit.    Please feel free to contact myself or my nurse with any questions or concerns.    Sincerely,         Betty Ham MD/alfonso

## 2022-02-28 NOTE — TELEPHONE ENCOUNTER
Dr Harper reviewed dose of sertraline and has changed it to 50mg daily, she has sent the pharmacy a prescription. Send this so her medication list can be updated.  Thank you  Nolvia Wong [facility]  [address] [place] [address]  LUCIO Stone [id number] [phone]

## 2022-02-28 NOTE — TELEPHONE ENCOUNTER
Attempted to call pt at home/cell. This was the first attempt at calling and voice message left to return call to clinic at [phone]. Please relay message form Dr. Ham.    Miri MENDEZ RN, BSN  [facility]

## 2022-02-28 NOTE — TELEPHONE ENCOUNTER
Call Pt stop Prozac  Due to Interaction with Tamoxifen  Switch to zoloft 25 mg daily   Follow up 1 month-virtual

## 2022-03-03 NOTE — TELEPHONE ENCOUNTER
Team, unable to reach patient via phone. Please mail Dr. Ham's recommendations to patient. Thanks!     Audelia Sawant RN   [facility]

## 2022-03-16 NOTE — TELEPHONE ENCOUNTER
Reason for Call:  Other - Call Back    Detailed comments: Anastasia from [facility] is returning a call to Angy Thompson. She did not have a direct number to her. She asked that a message be sent to Angy letting her know that they are in the process of getting a PA for a butrans patch. If any other questions please call back    Phone Number Patient can be reached at: [facility]: [phone]    Best Time: Anytime    Can we leave a detailed message on this number? YES    Call taken on 3/16/2022 at 3:43 PM by Rebeca Jones

## 2022-03-16 NOTE — PROGRESS NOTES
ADDENDUM: [facility] is pursuing a PA for Butrans patch for long acting pain management.  Medication Therapy Management (MTM) Encounter    ASSESSMENT:                            Medication Adherence/Access: No issues identified    COPD/Allergies: Stable.    Depression/Anxiety: Patient would benefit from follow-up with palliative to see if they would increase her sertraline dose.    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 Discussed cash price of MS Contin vs Oxycontin (1/3 or less the cost) and that insurance coverage for both is likely much better than it was a few years ago. She is interested in whether her team would be open to MS Contin.    MRSA Treatment: Stable. 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    Hyperlipidemia: Stable.    PLAN:                            1. I will reach out to [facility]- Dr. Shanna Harper to see whether they would be open to MS Contin vs oxycontin. See if she wants to go up on sertraline for your depression or mood at your next visit with her. ADDENDUM: Called and left message with [facility].    2. Continue on minocycline as directed- this is to prevent recurrence of MRSA from your hip infection.    Follow-up: Return in 9 weeks (on 5/18/2022) for phone visit.    SUBJECTIVE/OBJECTIVE:                          Nuria Shelley is a 82 year old female called for an initial visit. She was referred to me from her health plan.      Reason for visit: Med check.    Allergies/ADRs: Reviewed in chart  Past Medical History: Reviewed in chart  Tobacco: She reports that she quit smoking about 42 years ago. Her smoking use included cigarettes. She quit after 20.00 years of use. She has never used smokeless tobacco.  Alcohol: not currently using  Caffeine: 1-2 cups a day    Medication Adherence/Access: no issues reported    COPD/Allergies: Current medications: ICS/LABA-  Advair 1 puff(s) twice daily  Short-Acting Bronchodilator: Albuterol MDI.     Patient is not experiencing side effects.   Patient reports the following symptoms: none. Primarily having allergy symptoms at this time.    Patient is taking fexofenadine 180mg daily and Flonase nasal spray religiously- still very stuffed up but managing.    Depression/Anxiety: Treated through palliative care at [facility]. Patient is currently taking sertraline 50mg daily and reports she is not doing very well. She reports she does sometimes take hydroxyzine as needed for anxiety which helps a little.    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 Tapia at [facility] as well as their palliative team with Dr. Harper. Patient is currently taking Tamoxifen 20mg daily and receiving Zometa infusions through oncology. Oxycodone is not working well for her pain. She and her team have discussed long-acting opioids but she's concerned about cost. She was on Oxycontin short-term long ago but the price was quite high.    Patient is taking bisacodyl 5mg daily and docusate 100mg twice daily to maintain regular BMs without issue.    MRSA: Patient is not sure whether she is supposed to be taking minocycline 100mg capsules or not but is trying to take it once daily. She states she has history of MRSA infection.    Hyperlipidemia: Current therapy includes simvastatin 20mg daily.  Patient reports no significant myalgias or other side effects.    Recent Labs   Lab Test 01/27/21  1159 11/30/18  1520 08/05/16  1240 07/29/15  0841 07/29/14  1630   CHOL 190 196   < > 195 263*   HDL 69 61   < > 81 74   * 109*   < > 103 165*   TRIG 77 132   < > 57 122   CHOLHDLRATIO  --   --   --  2.4 3.6    < > = values in this interval not displayed.     Last Comprehensive Metabolic Panel:  Sodium   Date Value Ref Range Status   07/07/2017 137 133 - 144 mmol/L Final     Potassium   Date Value Ref Range Status   09/26/2018 3.8 3.5 - 5.0 mmol/L Final      Chloride   Date Value Ref Range Status   07/07/2017 103 94 - 109 mmol/L Final     Carbon Dioxide   Date Value Ref Range Status   07/07/2017 26 20 - 32 mmol/L Final     Anion Gap   Date Value Ref Range Status   07/07/2017 8 3 - 14 mmol/L Final     Glucose   Date Value Ref Range Status   09/26/2018 127 (H) 65 - 100 mg/dL Final     Urea Nitrogen   Date Value Ref Range Status   07/07/2017 13 7 - 30 mg/dL Final     Creatinine   Date Value Ref Range Status   01/27/2021 0.59 0.52 - 1.04 mg/dL Final     GFR Estimate   Date Value Ref Range Status   01/27/2021 85 >60 mL/min/[1.73_m2] Final     Comment:     Non  GFR Calc  Starting 12/18/2018, serum creatinine based estimated GFR (eGFR) will be   calculated using the Chronic Kidney Disease Epidemiology Collaboration   (CKD-EPI) equation.       Calcium   Date Value Ref Range Status   07/07/2017 8.8 8.5 - 10.1 mg/dL Final     I spent 30 minutes with this patient today. All changes were made via collaborative practice agreement with Dr. Ham. A copy of the visit note was provided to the patient's provider(s).    The patient was mailed a summary of these recommendations.     Ching FernandezD, BCACP  Medication Therapy Management Provider  Phone: [phone]  [email or web address]    Telemedicine Visit Details  Type of service:  Telephone visit  Start Time: 2:09 PM  End Time: 2:39 PM  Originating Location (patient location): [place]  Distant Location (provider location):  [facility]     Medication Therapy Recommendations  No medication therapy recommendations to display

## 2022-03-16 NOTE — LETTER
"Recommended To-Do List      Prepared on: 3/16/2022     You can get the best results from your medications by completing the items on this \"To-Do List.\"      Bring your To-Do List when you go to your doctor. And, share it with your family or caregivers.    My To-Do List:  What we talked about: What I should do:   A new medication that may be of benefit to you    Ask your oncology team if they think MS Contin (long acting morphine) would be an option for your pain          What we talked about: What I should do:                     "

## 2022-03-16 NOTE — PATIENT INSTRUCTIONS
NUTRITION   RD Screen      Diet: Cardiac  Body mass index is 33.08 kg/(m^2). Skin:  No skin breakdown documented  PO Intake: Good  Patient Vitals for the past 100 hrs:   % Diet Eaten   03/18/17 2100 100 %       Estimated Daily Nutrition Requirements:  Kcals:    2073 (MSJ x 1.3 AF- 250)              Protein:      67-81 g/d (1.0-1.2 g/Kg             Fluid:   1 mL/Kcal    Pt screened for nutrition risk d/t LOS. Pt is tolerating diet w/ adequate po intake and meeting estimated nutrition needs at this time. Current diet appropriate at this time. RD will be available by consult for any further needs. Thank you. Nutrition Diagnosis: n/a  Nutrition Intervention: n/a  Goal: n/a  Monitoring and Evaluation: n/a    No nutrition risk identified at this time. RD will continue to monitor and will f/u for further nutrition assessment and intervention as appropriate    Education & Discharge Needs:   [] Nutrition related discharge needs addressed:     [] Supplements (on d/c instruction &/or coupons provided)  [] Education    [x]No nutrition related discharge needs at this time     Cultural, Taoist and ethnic food preferences identified    [x]None   [] Yes     Lidya Walsh.  Alan Nevarez, [address] Recommendations from today's MTM visit:                                                       1. I will reach out to [facility]- Dr. Shanna Harper to see whether they would be open to MS Contin vs oxycontin. See if she wants to go up on sertraline for your depression or mood at your next visit with her.    2. Continue on minocycline as directed- this is to prevent recurrence of MRSA from your hip infection.    Follow-up: Return in 9 weeks (on 5/18/2022) for phone visit.    It was great to speak with you today.  I value your experience and would be very thankful for your time with providing feedback on our clinic survey. You may receive a survey via email or text message in the next few days.     To schedule another MTM appointment, please call the clinic directly or you may call the MTM scheduling line at [phone] or toll-free at [phone].     My Clinical Pharmacist's contact information:                                                      Please feel free to contact me with any questions or concerns you have.      Angy Thompson, [place], BCACP  Medication Therapy Management Provider  Phone: [phone]  [email or web address]

## 2022-03-16 NOTE — LETTER
_  Medication List        Prepared on: 3/16/2022     Bring your Medication List when you go to the doctor, hospital, or   emergency room. And, share it with your family or caregivers.     Note any changes to how you take your medications.  Cross out medications when you no longer use them.    Medication How I take it Why I use it Prescriber   albuterol (VENTOLIN HFA) 108 (90 Base) MCG/ACT inhaler INHALE 2 PUFFS INTO THE LUNGS EVERY 6 HOURS AS NEEDED FOR SHORTNESS OF BREATH/DYSPNEA Chronic obstructive pulmonary disease, unspecified COPD type (H) Betty Ham MD   Bioflavonoid Products (TEREZA C OR) Take 1,000 mg by mouth daily General Health Patient Reported   bisacodyl (DULCOLAX) 5 MG EC tablet Take 5 mg by mouth daily Constipation Patient Reported   Calcium-Vitamin D (CALCIUM + D PO) Take  by mouth. 1200 mg calcium daily with vitamin D 1000 units daily Bone Health Patient Reported   docusate sodium (COLACE) 100 MG capsule Take 100 mg by mouth 2 times daily Constipation Patient Reported   fexofenadine (ALLEGRA) 180 MG tablet Take 180 mg by mouth daily Allergies Patient Reported   fluticasone (FLONASE) 50 MCG/ACT nasal spray USE ONE TO TWO SPRAYS INTO EACH NOSTRIL ONCE DAILY Chronic Nasal Congestion Betty Ham MD   fluticasone-salmeterol (ADVAIR DISKUS) 100-50 MCG/DOSE diskus inhaler Inhale 1 puff into the lungs two times daily COPD Patient Reported   hydrOXYzine (VISTARIL) 25 MG capsule Take 1 capsule by mouth as needed for anxiety Anxiety Patient Reported   minocycline (MINOCIN/DYNACIN) 100 MG capsule TK 1 C PO BID MRSA hip infection Patient Reported   Multiple Vitamin (MULTI-VITAMIN PO) Take  by mouth. General Health Patient Reported   oxyCODONE (ROXICODONE) 5 MG tablet Take 1-2 tablets by mouth Pain Patient Reported   sertraline (ZOLOFT) 50 MG tablet Take 1 tablet (50 mg) by mouth daily Depression Betty Ham MD   simvastatin (ZOCOR) 20 MG tablet TAKE ONE TABLET BY MOUTH EVERY DAY AT BEDTIME Hyperlipidemia LDL  Goal <130 Betty Ham MD   tamoxifen (NOLVADEX) 20 MG tablet Take 20 mg by mouth daily Breast Cancer Patient Reported         Add new medications, over-the-counter drugs, herbals, vitamins, or  minerals in the blank rows below.    Medication How I take it Why I use it Prescriber                          Allergies:      nickel; dust mite extract; erythromycin; no clinical screening - see comments; pcn [penicillins]; sulfa drugs; macrobid [nitrofuran derivatives]; nitrofurantoin; quinolones        Side effects I have had:              Other Information:             My notes and questions:

## 2022-03-16 NOTE — LETTER
March 16, 2022  Nuria Shelley  [address] [place] [id number]    Dear Ms. Shelley, [facility] REBECCA        Thank you for talking with me on Mar 16, 2022 about your health and medications. As a follow-up to our conversation, I have included two documents:      1. Your Recommended To-Do List has steps you should take to get the best results from your medications.  2. Your Medication List will help you keep track of your medications and how to take them.    If you want to talk about these documents, please call Angy Thompson RPH at phone: [phone], Monday-Friday 8-4:30pm.    I look forward to working with you and your doctors to make sure your medications work well for you.    Sincerely,    Angy Thompson RPH  [facility] Pharmacist, [facility]

## 2022-03-17 NOTE — TELEPHONE ENCOUNTER
Since the buprenorphine prior auth was approve, should the patient still  her oxycodone?  Thank you  Nolvia Wong, [facility]  [address] [place] [address]  [place] [address]  [phone]

## 2022-03-17 NOTE — TELEPHONE ENCOUNTER
Disregard, spoke with SILVIA Oconnor, checking with patient about cost.  Thank you  Nolvia Wong, [facility]  [address] [place] [address]  [place] [id number] [phone]

## 2022-03-24 NOTE — TELEPHONE ENCOUNTER
Reason for Call:  Other     Detailed comments:  Meri from [facility] calling about patient's palliative care meeting next Tuesday 3/28/22 about referral from oncology.  Will send you summary of appointment.    Phone Number Patient can be reached at: [phone]    Best Time:  anytime    Can we leave a detailed message on this number? YES    Call taken on 3/24/2022 at 1:22 PM by Nadia Jon

## 2022-05-18 NOTE — TELEPHONE ENCOUNTER
The patient is requesting a refill for her Meclizine 12.5 mg.  Thank You,  Theresa Salmeron [facility] [place]

## 2022-05-19 PROBLEM — A41.9 SEPSIS (H): Status: RESOLVED | Noted: 2018-09-24 | Resolved: 2022-01-01

## 2022-05-19 PROBLEM — R53.1 WEAKNESS: Status: RESOLVED | Noted: 2020-03-29 | Resolved: 2022-01-01

## 2022-05-19 NOTE — COMMUNITY RESOURCES LIST (ENGLISH)
05/19/2022   [facility]  Betty Ham  For questions about this resource list or additional care needs, please contact your primary care clinic or care manager.  Phone: [phone]   Email: N/A   Address: [address] [place] [address]   Hours: N/A        Hotlines and Helplines       Hotline - Crisis help  1  [facility]   Family Wellness (AIF) - Crisis Helpline Distance: 2.18 miles      COVID-19 Status: Phone/Virtual   [address] [place] [address]  Language: Urdu, French, English, Gujarati, Kira, Tamazight, Portuguese, Vietnamese, Lao, Maori  Hours: Mon - Sun Open 24 Hours  Fees: Free   Phone: (987) 439-6287 Email: info@[facility].org Website: https://www.[facility].org/     2  [facility] Distance: 4.51 miles      COVID-19 Status: Phone/Virtual   [address] [address]  Language: English, Faroese, Haitian  Hours: Mon - Sun 10:00 AM - 10:00 PM   Phone: (964) 493-6257 Email: azul@[email or web address].org Website: http://[facility].org/          Mental Health       Individual counseling  3  [facility] Distance: 0.68 miles      COVID-19 Status: Regular Operations, COVID-19 Status: Phone/Virtual   [address] [place] [address]  Language: English  Hours: Mon - Thu 7:00 AM - 6:00 PM , Fri 7:00 AM - 5:00 PM  Fees: Insurance, Self Pay   Phone: (432) 667-2450 Email: [email or web address].[facility] Website: https://www.[employer].org/[facility]/cowxnrse-wfpunry-jisbbcb     4  [facility] Distance: 1.82 miles      COVID-19 Status: Unable to Verify   [address] [address]  Language: English  Hours: Mon - Fri 6:00 AM - 9:00 PM  Fees: Insurance, Self Pay, Sliding Fee   Phone: (637) 143-5489 Email: info@[employer] Website: http://www.[employer]     Mental health crisis care  [address] [place] [address]  Crisis Services Distance: 5.47 miles      COVID-19 Status: Regular Operations, COVID-19 Status: Phone/Virtual   [address] [place] [address]  Language: English  Hours: Mon - Sun Open 24 Hours  Fees: Insurance, Self Pay   Phone: (756) 783-3268 Website: https://www.[employer]/location/[facility]/     6  [facility] Services Distance: 7.06 miles      COVID-19 Status: Regular Operations   [address] [place] [address]  Language: English  Hours: Mon - Sun Open 24 Hours  Fees: Insurance, Self Pay, Sliding Fee   Phone: (929) 480-6198 Website: https://www.[facility].org/specialty/psychiatry/acute-psychiatry-services/     Mental health support group  7  [facility] Distance: 2.64 miles      COVID-19 Status: Phone/Virtual   [address] [place] [address]  Language: English  Hours: Mon - Fri 9:00 AM - 3:00 PM  Fees: Free   Phone: (234) 661-3295 Website: http://www.[employer].org/     8  [facility] ([facility] Distance: 6.59 miles      COVID-19 Status: Regular Operations, COVID-19 Status: Phone/Virtual   [address] [place] [address]  Language: English, Welsh, Phong  Hours: Mon - Fri 9:00 AM - 5:00 PM  Fees: Free   Phone: (548) 661-6180 Email: susi@[employer] Website: https://www.[employer].com/[employer].org/          Important Numbers & Websites       Emergency Services   911  City Services   311  Poison Control   (987) 842-8742  Suicide Prevention Lifeline   (503) 397-2543 (TALK)  Child Abuse Hotline   (798) 548-9005 (4-A-Child)  Sexual Assault Hotline   (345) 944-9964 (HOPE)  National Runaway Safeline   (474) 135-6192 (RUNAWAY)  All-Options Talkline   (808) 274-8150  Substance Abuse Referral   (318) 238-8976 (HELP)

## 2022-05-19 NOTE — PROGRESS NOTES
"  Assessment & Plan     Malignant neoplasm of right female breast, unspecified estrogen receptor status, unspecified site of breast (H)  Sees Oncology  Notes have been reviewed   - CBC with platelets; Future    Bone metastasis (H)  As above  - CBC with platelets; Future    Major depressive disorder, recurrent episode, mild (H)  Pt feels better when taking meds    Hyperlipidemia LDL goal <130  Labs pending   - Lipid panel reflex to direct LDL Non-fasting; Future  - simvastatin (ZOCOR) 20 MG tablet; TAKE ONE TABLET BY MOUTH EVERY DAY AT BEDTIME  - Lipid panel reflex to direct LDL Fasting; Future    Essential hypertension  Stable   - Basic metabolic panel  (Ca, Cl, CO2, Creat, Gluc, K, Na, BUN); Future  - Basic metabolic panel; Future    Chronic nasal congestion  refilled  - fluticasone (FLONASE) 50 MCG/ACT nasal spray; ++USE ONE TO TWO SPRAYS INTO EACH NOSTRIL ONCE DAILY    Chronic obstructive pulmonary disease, unspecified COPD type (H)  At baseline  She is Not taking advair  Says she takes it when needed   She would Like a refill but feels Right now she is doing well without meds  - albuterol (VENTOLIN HFA) 108 (90 Base) MCG/ACT inhaler; INHALE 2 PUFFS INTO THE LUNGS EVERY 6 HOURS AS NEEDED FOR SHORTNESS OF BREATH/DYSPNEA  - fluticasone-salmeterol (ADVAIR) 100-50 MCG/ACT inhaler; Inhale 1 puff into the lungs every 12 hours Fill when pt calls    High priority for 2019-nCoV vaccine  Discussed   - COVID-19,PF,PFIZER (12+ Yrs GRAY LABEL)  BMI:   Estimated body mass index is 26.47 kg/m  as calculated from the following:    Height as of 5/19/21: 1.626 m (5' 4\").    Weight as of this encounter: 69.9 kg (154 lb 3.2 oz).           Return in about 3 months (around 8/19/2022) for Medicare wellness Exam.    Betty Ham MD  [facility] REBECCA Quiñones is a 82 year old who presents for the following health issues  accompanied by her daughter.  Pt is a 83 yo with Known History of COPD,metastatic Lung " Ca who comes in to recheck meds      History of Present Illness       She eats 2-3 servings of fruits and vegetables daily.She consumes 2 sweetened beverage(s) daily.She exercises with enough effort to increase her heart rate 9 or less minutes per day.  She exercises with enough effort to increase her heart rate 3 or less days per week. She is missing 1 dose(s) of medications per week.    Today's PHQ-9         PHQ-9 Total Score: 11    PHQ-9 Q9 Thoughts of better off dead/self-harm past 2 weeks :   Not at all    How difficult have these problems made it for you to do your work, take care of things at home, or get along with other people: Somewhat difficult       Hyperlipidemia Follow-Up      Are you regularly taking any medication or supplement to lower your cholesterol?   Yes- statin    Are you having muscle aches or other side effects that you think could be caused by your cholesterol lowering medication?  No    Hypertension Follow-up      Do you check your blood pressure regularly outside of the clinic? No     Are you following a low salt diet? Yes    Are your blood pressures ever more than 140 on the top number (systolic) OR more   than 90 on the bottom number (diastolic), for example 140/90? No    COPD Follow-Up    Overall, how are your COPD symptoms since your last clinic visit?  No change    How much fatigue or shortness of breath do you have when you are walking?  Same as usual    How much shortness of breath do you have when you are resting?  Same as usual    How often do you cough? Rarely    Have you noticed any change in your sputum/phlegm?  No    Have you experienced a recent fever? No    Please describe how far you can walk without stopping to rest:  Less than 10 feet    How many flights of stairs are you able to walk up without stopping?  None        History   Smoking Status     Former Smoker     Years: 20.00     Types: Cigarettes     Quit date: 1/12/1980   Smokeless Tobacco     Never Used     Lab  Results   Component Value Date    FEV1 69 06/19/2015    TLS0RAU 123 06/19/2015   Pt  Has  Depression   She feels better says she was doing well on meds  She ran out of meds and was Not sleeping well but when she takes her meds she does well    Review of Systems   CONSTITUTIONAL: NEGATIVE for fever, chills, change in weight  ENT/MOUTH: NEGATIVE for ear, mouth and throat problems  RESP: NEGATIVE for significant cough or SOB  CV: NEGATIVE for chest pain, palpitations or peripheral edema  GI: NEGATIVE for nausea, abdominal pain, heartburn, or change in bowel habits  MUSCULOSKELETAL: NEGATIVE for significant arthralgias or myalgia  PSYCHIATRIC: as above      Objective    /74   Pulse 90   Temp 98.9  F (37.2  C) (Temporal)   Wt 69.9 kg (154 lb 3.2 oz)   SpO2 96%   BMI 26.47 kg/m    Body mass index is 26.47 kg/m .  Physical Exam   GENERAL: healthy, alert and no distress-Pt looks happy, less anxious than before  EYES: Eyes grossly normal to inspection  NECK: no adenopathy, no asymmetry, masses, or scars and thyroid normal to palpation  RESP: lungs clear to auscultation - no rales, rhonchi or wheezes  CV: regular rate and rhythm, normal S1 S2, no S3 or S4, no murmur, click or rub,and peripheral pulses strong  ABDOMEN: soft, nontender, no hepatosplenomegaly, no masses and bowel sounds normal  MS: no gross musculoskeletal defects noted,  PSYCH: mentation appears normal, affect normal/bright  Trace pedal edema  Pending

## 2022-05-19 NOTE — NURSING NOTE
Prior to immunization administration, verified patients identity using patient s name and date of birth. Please see Immunization Activity for additional information.     Screening Questionnaire for Adult Immunization    Are you sick today?   No   Do you have allergies to medications, food, a vaccine component or latex?   No   Have you ever had a serious reaction after receiving a vaccination?   No   Do you have a long-term health problem with heart, lung, kidney, or metabolic disease (e.g., diabetes), asthma, a blood disorder, no spleen, complement component deficiency, a cochlear implant, or a spinal fluid leak?  Are you on long-term aspirin therapy?   No   Do you have cancer, leukemia, HIV/AIDS, or any other immune system problem?   No   Do you have a parent, brother, or sister with an immune system problem?   No   In the past 3 months, have you taken medications that affect  your immune system, such as prednisone, other steroids, or anticancer drugs; drugs for the treatment of rheumatoid arthritis, Crohn s disease, or psoriasis; or have you had radiation treatments?   No   Have you had a seizure, or a brain or other nervous system problem?   No   During the past year, have you received a transfusion of blood or blood    products, or been given immune (gamma) globulin or antiviral drug?   No   For women: Are you pregnant or is there a chance you could become       pregnant during the next month?   No   Have you received any vaccinations in the past 4 weeks?   No     Immunization questionnaire answers were all negative.        Per orders of Dr. Ham, injection of Pfizer COVID-19 booster given by Kymberly Gutierrez MA. Patient instructed to remain in clinic for 15 minutes afterwards, and to report any adverse reaction to me immediately.       Screening performed by Kymberly Gutierrez MA on 5/19/2022 at 12:40 PM.  Kymberly Gutierrez MA on 5/19/2022 at 12:40 PM

## 2022-05-23 NOTE — TELEPHONE ENCOUNTER
The patient is requesting a refill on her Meclizine 12.5 mg tablets.  Thank You,  Theresa Salmeron [facility] [place]

## 2022-06-13 NOTE — TELEPHONE ENCOUNTER
Meri with [facility] (palliative care) calling to update PCP, Betty Ruth that palliative care NP is out for 4-6 weeks.  They did provide patient with 1 month refills on meds before their NP went on leave but has advised patient to reach out to Betty Ruth with any medical concerns during this time until palliative care provider is back in the office.  They are asking that PCP assist patient during this time if needed      ARMANDO Carreon RN  [facility]

## 2022-06-17 NOTE — TELEPHONE ENCOUNTER
Opened by: Betty Ham MD                    on Mon Jun 13, 2022  3:44 PM        Doned by: Betty Ham MD                    on Mon Jun 13, 2022  3:44 PM

## 2022-06-30 PROBLEM — M25.552 HIP PAIN, ACUTE, LEFT: Status: RESOLVED | Noted: 2018-09-24 | Resolved: 2022-01-01

## 2022-06-30 PROBLEM — I10 ESSENTIAL HYPERTENSION: Status: ACTIVE | Noted: 2018-06-17

## 2022-06-30 PROBLEM — S72.002A FRACTURE OF HIP, LEFT, CLOSED, INITIAL ENCOUNTER (H): Status: RESOLVED | Noted: 2018-07-19 | Resolved: 2022-01-01

## 2022-06-30 NOTE — TELEPHONE ENCOUNTER
Pt calling. States she is trying to request refills through Marleni Toney CNP, but is unable to reach this provider and was advised to contact her PCP's office.    Pt is requesting:  Dexamethasone 4mg - 1 tab bid- takes for her cancer or inflammation.     Oxycodone 5mg takes 1 tablet daily.    Advised that Dr. Ham is out of office returning on 7/6. Pt requesting a covering provider review and advise. Pt would like to be contacted once refills have been sent.     Routing refill request to provider for review/approval because:  Drug not on the [facility] refill protocol     Amanda Padilla RN  [facility]

## 2022-06-30 NOTE — TELEPHONE ENCOUNTER
"rx sent    But tell her I sent dexamethasone as \"bid\" ongoing.  I was unclear as to whether this is meant to be a short burst for a few days, or ongoing.       The intent is to be sure she has it available to use has she has been per her oncology team.   "

## 2022-06-30 NOTE — TELEPHONE ENCOUNTER
Attempted to call, line busy, will have to recall again.     Thanks,  Rachel RN  [facility]

## 2022-07-01 NOTE — TELEPHONE ENCOUNTER
"Patient has appt with oncology in August and stated she will check on the dexamethasone as she is unsure about if ongoing for this medication. I asked pt to please call before August, pt agreeable stated, \"I am not going to call today but after the holiday I will call and check with Dr. Tapia and Dr. Toney about the steroid\".     Thanks,  SILVIA Ramos  [facility]     "

## 2022-07-22 NOTE — PROGRESS NOTES
Called to schedule MTM appt LVM. Scheduled for 8/17/2022 at 1:00 PM.    Ashley Simon, PharmD IV Student  Joanna Padilla, Ch[employer]  Medication Therapy Management Pharmacist  [phone]  [facility]: [phone]

## 2022-08-17 NOTE — TELEPHONE ENCOUNTER
Per pt Yuko @   usually helps with getting prescription refilled for:        I spoke with Yuko RN, stated she can reach out to Nuria to discuss medication and to help determine who prescribe is so that patient can direct refill to appropriate provider. Per Yuko will reach out to clinic to give any needed updates.     Thanks,  Rachel RN  [facility]

## 2022-08-18 NOTE — TELEPHONE ENCOUNTER
Yuko says that she has not been in contact with the patient for over two years now so she is unable to assist anymore with this. But she was able to make some calls regarding the Rx.     She found out that the original prescribe is Marleni Toney but there has been no luck on trying to find this provider in the system of contact information.     Yuko said the best she can do is give her recommendations id her PCP can not prescribe medications. Maybe refer patient to a pain clinic or if this is cancer relates ask someone in [place] [facility].     Yuko wanted to remind the clinic that patient is not longer in contact with her and can no longer assist.     Maria Teresa MCCOY,

## 2022-08-18 NOTE — TELEPHONE ENCOUNTER
Info found on prescribing provider:  TG HERMAN  [address] [place], [place]  [place]IP [address]  Phone: [phone]    Attempted to call number and left detailed vm regarding med refill below with triage nurse with [facility] which is where this provider practices. Told the triage RN they can reach out if needing more info.     Called pt and left vm stating that ordering providers team notified about refill request. I told pt to please follow up with this provider team and left number above.     Thanks,  SILVIA Ramos  [facility]

## 2022-08-19 NOTE — TELEPHONE ENCOUNTER
I do not refill her Butran  Please refer to provider who started her Butran prescriptions  This is not a medicines I do

## 2022-08-19 NOTE — TELEPHONE ENCOUNTER
"Original message listed under comments section of reason for call:    \"Hi Dr. Ham,   We received a phone call from Nuria's  this evening in regards to her Butrans patch that she has been requesting to get refilled. There are a couple of different prescribers that have been managing this prescription. Would you be willing to refer a prescriber to Nuria who is within the [facility] system to decrease confusion?   Thank you!\"    Can a referral be made? Please advise.     Audelia Sawant RN   [facility]        "

## 2022-08-22 NOTE — TELEPHONE ENCOUNTER
Called [facility] and confirmed that Dr. Shanna Grace (Paliative care provider at [facility]) [phone] was managing this medication.    [facility] will review and send a prescription to the [facility].    Dr. Lefty Tapia (Hematology/oncology) has taken over managing and prescribing this medication.  Direct contact to Dr. Tapia's team:  [phone] or [phone]    Marleni Toney [phone] has also prescribed this medication in the past.

## 2022-08-22 NOTE — TELEPHONE ENCOUNTER
We have been unable to reach this patient for MTM follow-up after several attempts. We will stop reaching out to the patient at this time. Please let us know if we can assist in this patient's care in the future.     Routing to PCP as AIRAM Padilla, PharmD  Medication Therapy Management Pharmacist  [phone]

## 2022-08-22 NOTE — TELEPHONE ENCOUNTER
Oncology RN called regarding pain patches. Per RN oncology does not prescribe this either. Pt is getting palliative care with Marleni Toney NP.     Please see encounter from 8/16/22 for the same situation. This medication is coming from NP with palliative.      Attempted to call pts dtr jan Gabriel stating that neither Dr. Ham or Dr. Tapia prescribe pain patches. Told dtr pt needs to contact Marleni Toney NP at office number below regarding rills on the pain patches.     Thanks,  SILVIA Ramos  [facility]

## 2022-08-22 NOTE — TELEPHONE ENCOUNTER
Triage -Please call pharmacy and see who Prescribed this medicine  This is not something I prescribe  Is this her Oncologist  Please refer refills to that Provider

## 2022-10-17 NOTE — TELEPHONE ENCOUNTER
Nurse Triage SBAR    Is this a 2nd Level Triage? NO    Situation: Daughter, Jackie, calling with concerns that her mother is not taking care of herself. Daughter states her legs are swollen, she is lethargic and in pain, but won't do anything about it. Jackie is not currently with patient, but will be later this morning. Advised calling back for triage and advice when she is with patient.  Consent: on file in chart    Background: Elderly patient with history if COPD, cancer, and hypertenstion    Assessment: N/A    Protocol Recommended Disposition:   Home care - Information call only    Recommendation: Advised daughter to call back when with patient for triage and advice. Daughter verbalized understanding and agreed with plan.     Cynthia Arndt RN [facility] 10/17/2022 7:40 AM    Reason for Disposition    [1] Caller is not with the adult (patient) AND [2] probable NON-URGENT symptoms    Protocols used: INFORMATION ONLY CALL - NO TRIAGE-A-

## 2022-10-17 NOTE — TELEPHONE ENCOUNTER
Spoke with patient's daughter, Nery calling on behalf of the patient. Consent to communicate on file. She describes that patient has been having swelling of her legs that has been very bad over past few weeks. Patient has been having swelling of both of her legs that extend beyond her knees and to the top of her feet. Patient's face is also swollen. Patient's right leg is red, black, and blue in color. Patient denies pain in her legs. Patient has some shortness of breath. Patient denies chest pain however. Patient has had a history of cancer. Patient explains she is feeling very fatigued, and does not think she will make it to the car.     Per protocol, patient should be seen in the ED/[facility] now. Patient's daughter verbalized understanding, and plans to call for an ambulance due to patient being very fatigued and unable to ambulate to the car. Advised to call clinic back if unable to go to ED/[facility]. Patient's daughter has no further questions at this time.    Kim Avila RN  [facility]    Reason for Disposition    SEVERE swelling (e.g., swelling extends above knee, entire leg is swollen, weeping fluid)    Additional Information    Negative: Chest pain    Negative: Small area of swelling and followed an insect bite to the area    Negative: Followed a knee injury    Negative: Ankle or foot injury    Negative: Pregnant with leg swelling or edema    Negative: Difficulty breathing at rest    Negative: Entire foot is cool or blue in comparison to other side    Protocols used: LEG SWELLING AND EDEMA-A-OH

## 2023-04-03 PROBLEM — C50.919 PRIMARY MALIGNANT NEOPLASM OF BREAST WITH METASTASIS (H): Status: ACTIVE | Noted: 2018-11-30

## 2023-08-02 NOTE — TELEPHONE ENCOUNTER
Completed form faxed to the fax number on the form. Charlee Bull,   
Reason for call: question  Patient called regarding (reason for call): Khloe faxed a face to face sheet on 7/20/2018 and is asking if is done yet?  Additional comments: please call to discuss further    Phone number to reach patient: [phone]  Best Time:  8-5    Can we leave a detailed message on this number?  YES     
Not applicable

## 2025-06-16 NOTE — TELEPHONE ENCOUNTER
Antibiotics changed to keflex  Follow up 1 week if not better/sooner if worse    
Called and spoke with patient. Patient was seen today by Dr. Ham and states she was prescribed Lexapro but she refuses to start it. States she has too many things going on right now and isn't going to start a new medication until she feels better.   Patient is requesting to go back on Paxil 20 mg tablets, stating they worked just fine and she only falls in the spring and fall because of allergy attacks, not because of the medication.  Patient states she also came in today for a sinus infection and needs an antibiotic to get rid of this because the Flonase is not helping and when she uses it, it just burns her nose and gives her a headache.    Tatiana Palma RN  
Huddled with Dr. Ham. Paxil is not recommended for her age. She should try to switch. Can prescribe doxycycline 100mg bid x 10 days, pt should take medication on a full stomach.  Spoke with pt. Reviewed provider's message. She states she is scared to take the Lexapro after reading about all of the side effects. She doesn't want another issue added to her plate. Wants to know if she could just take the Paxil for another month? Script for doxycycline sent to preferred pharmacy. Please advise.    Amanda Padilla RN  [facility], [place]      
Patient wants paxil despite it not being recommended for her age. She wants to discuss with someone.  
Reason for call:  Other   Patient called regarding (reason for call): call back  Additional comments: Patient is calling because she says she does not want to start a new antidepressant. Please call back     Phone number to reach patient:  Home number on file [phone] (home)    Best Time:  any    Can we leave a detailed message on this number?  YES  
SOB (shortness of breath)